# Patient Record
Sex: MALE | Race: WHITE | NOT HISPANIC OR LATINO | Employment: FULL TIME | URBAN - METROPOLITAN AREA
[De-identification: names, ages, dates, MRNs, and addresses within clinical notes are randomized per-mention and may not be internally consistent; named-entity substitution may affect disease eponyms.]

---

## 2017-01-24 ENCOUNTER — APPOINTMENT (OUTPATIENT)
Dept: CARDIOLOGY | Facility: CLINIC | Age: 57
DRG: 247 | End: 2017-01-24
Attending: EMERGENCY MEDICINE
Payer: COMMERCIAL

## 2017-01-24 ENCOUNTER — APPOINTMENT (OUTPATIENT)
Dept: GENERAL RADIOLOGY | Facility: CLINIC | Age: 57
DRG: 247 | End: 2017-01-24
Attending: EMERGENCY MEDICINE
Payer: COMMERCIAL

## 2017-01-24 ENCOUNTER — HOSPITAL ENCOUNTER (INPATIENT)
Facility: CLINIC | Age: 57
LOS: 3 days | Discharge: HOME | DRG: 247 | End: 2017-01-27
Attending: EMERGENCY MEDICINE | Admitting: INTERNAL MEDICINE
Payer: COMMERCIAL

## 2017-01-24 DIAGNOSIS — I10 BENIGN ESSENTIAL HTN: ICD-10-CM

## 2017-01-24 DIAGNOSIS — I25.5 ISCHEMIC CARDIOMYOPATHY: ICD-10-CM

## 2017-01-24 DIAGNOSIS — I21.3 ST ELEVATION MYOCARDIAL INFARCTION (STEMI), UNSPECIFIED ARTERY (H): ICD-10-CM

## 2017-01-24 DIAGNOSIS — E78.5 DYSLIPIDEMIA, GOAL LDL BELOW 70: Primary | ICD-10-CM

## 2017-01-24 PROBLEM — I21.02 ST ELEVATION (STEMI) MYOCARDIAL INFARCTION INVOLVING LEFT ANTERIOR DESCENDING CORONARY ARTERY (H): Status: ACTIVE | Noted: 2017-01-24

## 2017-01-24 LAB
ANION GAP SERPL CALCULATED.3IONS-SCNC: 8 MMOL/L (ref 3–14)
BASOPHILS # BLD AUTO: 0 10E9/L (ref 0–0.2)
BASOPHILS NFR BLD AUTO: 0.4 %
BUN SERPL-MCNC: 15 MG/DL (ref 7–30)
CALCIUM SERPL-MCNC: 8.3 MG/DL (ref 8.5–10.1)
CHLORIDE SERPL-SCNC: 107 MMOL/L (ref 94–109)
CO2 SERPL-SCNC: 24 MMOL/L (ref 20–32)
CREAT SERPL-MCNC: 1.12 MG/DL (ref 0.66–1.25)
DIFFERENTIAL METHOD BLD: NORMAL
EOSINOPHIL # BLD AUTO: 0.3 10E9/L (ref 0–0.7)
EOSINOPHIL NFR BLD AUTO: 2.6 %
ERYTHROCYTE [DISTWIDTH] IN BLOOD BY AUTOMATED COUNT: 12.8 % (ref 10–15)
GFR SERPL CREATININE-BSD FRML MDRD: 68 ML/MIN/1.7M2
GLUCOSE SERPL-MCNC: 134 MG/DL (ref 70–99)
HCT VFR BLD AUTO: 44.4 % (ref 40–53)
HGB BLD-MCNC: 15.6 G/DL (ref 13.3–17.7)
IMM GRANULOCYTES # BLD: 0 10E9/L (ref 0–0.4)
IMM GRANULOCYTES NFR BLD: 0.3 %
INR PPP: 1.02 (ref 0.86–1.14)
INTERPRETATION ECG - MUSE: NORMAL
LYMPHOCYTES # BLD AUTO: 3.3 10E9/L (ref 0.8–5.3)
LYMPHOCYTES NFR BLD AUTO: 30.4 %
MCH RBC QN AUTO: 31.1 PG (ref 26.5–33)
MCHC RBC AUTO-ENTMCNC: 35.1 G/DL (ref 31.5–36.5)
MCV RBC AUTO: 88 FL (ref 78–100)
MONOCYTES # BLD AUTO: 0.5 10E9/L (ref 0–1.3)
MONOCYTES NFR BLD AUTO: 4.7 %
NEUTROPHILS # BLD AUTO: 6.6 10E9/L (ref 1.6–8.3)
NEUTROPHILS NFR BLD AUTO: 61.6 %
NRBC # BLD AUTO: 0 10*3/UL
NRBC BLD AUTO-RTO: 0 /100
PLATELET # BLD AUTO: 210 10E9/L (ref 150–450)
POTASSIUM SERPL-SCNC: 5.1 MMOL/L (ref 3.4–5.3)
RBC # BLD AUTO: 5.02 10E12/L (ref 4.4–5.9)
SODIUM SERPL-SCNC: 139 MMOL/L (ref 133–144)
TROPONIN I BLD-MCNC: 0 UG/L (ref 0–0.1)
TROPONIN I SERPL-MCNC: NORMAL UG/L (ref 0–0.04)
WBC # BLD AUTO: 10.8 10E9/L (ref 4–11)

## 2017-01-24 PROCEDURE — 40000275 ZZH STATISTIC RCP TIME EA 10 MIN

## 2017-01-24 PROCEDURE — 84484 ASSAY OF TROPONIN QUANT: CPT

## 2017-01-24 PROCEDURE — 25000132 ZZH RX MED GY IP 250 OP 250 PS 637: Performed by: INTERNAL MEDICINE

## 2017-01-24 PROCEDURE — 25000128 H RX IP 250 OP 636: Performed by: INTERNAL MEDICINE

## 2017-01-24 PROCEDURE — C1887 CATHETER, GUIDING: HCPCS

## 2017-01-24 PROCEDURE — 93005 ELECTROCARDIOGRAM TRACING: CPT

## 2017-01-24 PROCEDURE — 92921 ZZHC PRQ TRLUML CORONARY ANGIOPLASTY ADDL BRANCH: CPT | Mod: LD | Performed by: INTERNAL MEDICINE

## 2017-01-24 PROCEDURE — 99153 MOD SED SAME PHYS/QHP EA: CPT

## 2017-01-24 PROCEDURE — C9606 PERC D-E COR REVASC W AMI S: HCPCS | Mod: LD

## 2017-01-24 PROCEDURE — 99152 MOD SED SAME PHYS/QHP 5/>YRS: CPT

## 2017-01-24 PROCEDURE — 92921 ZZHC PRQ TRLUML CORONARY ANGIOPLASTY ADDL BRANCH: CPT | Mod: LD

## 2017-01-24 PROCEDURE — 71010 XR CHEST PORT 1 VW: CPT

## 2017-01-24 PROCEDURE — C1769 GUIDE WIRE: HCPCS

## 2017-01-24 PROCEDURE — 27210759 ZZH DEVICE INFLATION CR6

## 2017-01-24 PROCEDURE — 027034Z DILATION OF CORONARY ARTERY, ONE ARTERY WITH DRUG-ELUTING INTRALUMINAL DEVICE, PERCUTANEOUS APPROACH: ICD-10-PCS | Performed by: INTERNAL MEDICINE

## 2017-01-24 PROCEDURE — 85025 COMPLETE CBC W/AUTO DIFF WBC: CPT | Performed by: EMERGENCY MEDICINE

## 2017-01-24 PROCEDURE — 93458 L HRT ARTERY/VENTRICLE ANGIO: CPT

## 2017-01-24 PROCEDURE — 85610 PROTHROMBIN TIME: CPT | Performed by: EMERGENCY MEDICINE

## 2017-01-24 PROCEDURE — 27210787 ZZH MANIFOLD CR2

## 2017-01-24 PROCEDURE — 80048 BASIC METABOLIC PNL TOTAL CA: CPT | Performed by: EMERGENCY MEDICINE

## 2017-01-24 PROCEDURE — 27210795 ZZH PAD DEFIB QUICK CR4

## 2017-01-24 PROCEDURE — 4A023N7 MEASUREMENT OF CARDIAC SAMPLING AND PRESSURE, LEFT HEART, PERCUTANEOUS APPROACH: ICD-10-PCS | Performed by: INTERNAL MEDICINE

## 2017-01-24 PROCEDURE — 02703ZZ DILATION OF CORONARY ARTERY, ONE ARTERY, PERCUTANEOUS APPROACH: ICD-10-PCS | Performed by: INTERNAL MEDICINE

## 2017-01-24 PROCEDURE — 27210845 ZZH DEVICE INFLATION CR5

## 2017-01-24 PROCEDURE — C1725 CATH, TRANSLUMIN NON-LASER: HCPCS

## 2017-01-24 PROCEDURE — 84484 ASSAY OF TROPONIN QUANT: CPT | Performed by: EMERGENCY MEDICINE

## 2017-01-24 PROCEDURE — 27210946 ZZH KIT HC TOTES DISP CR8

## 2017-01-24 PROCEDURE — B2111ZZ FLUOROSCOPY OF MULTIPLE CORONARY ARTERIES USING LOW OSMOLAR CONTRAST: ICD-10-PCS | Performed by: INTERNAL MEDICINE

## 2017-01-24 PROCEDURE — 27211089 ZZH KIT ACIST INJECTOR CR3

## 2017-01-24 PROCEDURE — 25000128 H RX IP 250 OP 636: Performed by: EMERGENCY MEDICINE

## 2017-01-24 PROCEDURE — C1874 STENT, COATED/COV W/DEL SYS: HCPCS

## 2017-01-24 PROCEDURE — 93010 ELECTROCARDIOGRAM REPORT: CPT | Performed by: INTERNAL MEDICINE

## 2017-01-24 PROCEDURE — 99285 EMERGENCY DEPT VISIT HI MDM: CPT

## 2017-01-24 PROCEDURE — 3E033PZ INTRODUCTION OF PLATELET INHIBITOR INTO PERIPHERAL VEIN, PERCUTANEOUS APPROACH: ICD-10-PCS | Performed by: INTERNAL MEDICINE

## 2017-01-24 PROCEDURE — 25000125 ZZHC RX 250: Performed by: INTERNAL MEDICINE

## 2017-01-24 PROCEDURE — 25000128 H RX IP 250 OP 636

## 2017-01-24 PROCEDURE — 25000132 ZZH RX MED GY IP 250 OP 250 PS 637: Performed by: EMERGENCY MEDICINE

## 2017-01-24 PROCEDURE — 99153 MOD SED SAME PHYS/QHP EA: CPT | Mod: GC | Performed by: INTERNAL MEDICINE

## 2017-01-24 PROCEDURE — 92941 PRQ TRLML REVSC TOT OCCL AMI: CPT | Mod: LD | Performed by: INTERNAL MEDICINE

## 2017-01-24 PROCEDURE — 20000003 ZZH R&B ICU

## 2017-01-24 PROCEDURE — 93458 L HRT ARTERY/VENTRICLE ANGIO: CPT | Mod: 26 | Performed by: INTERNAL MEDICINE

## 2017-01-24 PROCEDURE — 99152 MOD SED SAME PHYS/QHP 5/>YRS: CPT | Mod: GC | Performed by: INTERNAL MEDICINE

## 2017-01-24 RX ORDER — PRASUGREL 10 MG/1
10-60 TABLET, FILM COATED ORAL
Status: DISCONTINUED | OUTPATIENT
Start: 2017-01-24 | End: 2017-01-24 | Stop reason: HOSPADM

## 2017-01-24 RX ORDER — CLOPIDOGREL BISULFATE 75 MG/1
300-600 TABLET ORAL
Status: DISCONTINUED | OUTPATIENT
Start: 2017-01-24 | End: 2017-01-24 | Stop reason: HOSPADM

## 2017-01-24 RX ORDER — MORPHINE SULFATE 2 MG/ML
1-2 INJECTION, SOLUTION INTRAMUSCULAR; INTRAVENOUS EVERY 5 MIN PRN
Status: DISCONTINUED | OUTPATIENT
Start: 2017-01-24 | End: 2017-01-24 | Stop reason: HOSPADM

## 2017-01-24 RX ORDER — EPTIFIBATIDE 2 MG/ML
180 INJECTION, SOLUTION INTRAVENOUS EVERY 10 MIN PRN
Status: DISCONTINUED | OUTPATIENT
Start: 2017-01-24 | End: 2017-01-24 | Stop reason: HOSPADM

## 2017-01-24 RX ORDER — ACETAMINOPHEN 325 MG/1
325-650 TABLET ORAL EVERY 4 HOURS PRN
Status: DISCONTINUED | OUTPATIENT
Start: 2017-01-24 | End: 2017-01-27

## 2017-01-24 RX ORDER — ASPIRIN 81 MG/1
81-324 TABLET, CHEWABLE ORAL
Status: DISCONTINUED | OUTPATIENT
Start: 2017-01-24 | End: 2017-01-24 | Stop reason: HOSPADM

## 2017-01-24 RX ORDER — FENTANYL CITRATE 50 UG/ML
25-50 INJECTION, SOLUTION INTRAMUSCULAR; INTRAVENOUS
Status: DISPENSED | OUTPATIENT
Start: 2017-01-24 | End: 2017-01-25

## 2017-01-24 RX ORDER — LIDOCAINE HYDROCHLORIDE 10 MG/ML
1-10 INJECTION, SOLUTION EPIDURAL; INFILTRATION; INTRACAUDAL; PERINEURAL
Status: DISCONTINUED | OUTPATIENT
Start: 2017-01-24 | End: 2017-01-24 | Stop reason: HOSPADM

## 2017-01-24 RX ORDER — IOPAMIDOL 755 MG/ML
230 INJECTION, SOLUTION INTRAVASCULAR ONCE
Status: COMPLETED | OUTPATIENT
Start: 2017-01-24 | End: 2017-01-24

## 2017-01-24 RX ORDER — MORPHINE SULFATE 4 MG/ML
4 INJECTION, SOLUTION INTRAMUSCULAR; INTRAVENOUS ONCE
Status: COMPLETED | OUTPATIENT
Start: 2017-01-24 | End: 2017-01-24

## 2017-01-24 RX ORDER — SIMVASTATIN 20 MG
20 TABLET ORAL EVERY EVENING
Status: ON HOLD | COMMUNITY
Start: 2016-05-04 | End: 2017-01-27

## 2017-01-24 RX ORDER — FLUMAZENIL 0.1 MG/ML
0.2 INJECTION, SOLUTION INTRAVENOUS
Status: ACTIVE | OUTPATIENT
Start: 2017-01-24 | End: 2017-01-25

## 2017-01-24 RX ORDER — PHENYLEPHRINE HCL IN 0.9% NACL 1 MG/10 ML
20-100 SYRINGE (ML) INTRAVENOUS
Status: DISCONTINUED | OUTPATIENT
Start: 2017-01-24 | End: 2017-01-24 | Stop reason: HOSPADM

## 2017-01-24 RX ORDER — ADENOSINE 3 MG/ML
12-12000 INJECTION, SOLUTION INTRAVENOUS
Status: DISCONTINUED | OUTPATIENT
Start: 2017-01-24 | End: 2017-01-24 | Stop reason: HOSPADM

## 2017-01-24 RX ORDER — DOBUTAMINE HYDROCHLORIDE 200 MG/100ML
2-20 INJECTION INTRAVENOUS CONTINUOUS PRN
Status: DISCONTINUED | OUTPATIENT
Start: 2017-01-24 | End: 2017-01-24 | Stop reason: HOSPADM

## 2017-01-24 RX ORDER — MORPHINE SULFATE 4 MG/ML
INJECTION, SOLUTION INTRAMUSCULAR; INTRAVENOUS
Status: COMPLETED
Start: 2017-01-24 | End: 2017-01-24

## 2017-01-24 RX ORDER — VERAPAMIL HYDROCHLORIDE 2.5 MG/ML
1-2.5 INJECTION, SOLUTION INTRAVENOUS
Status: DISCONTINUED | OUTPATIENT
Start: 2017-01-24 | End: 2017-01-24 | Stop reason: HOSPADM

## 2017-01-24 RX ORDER — METHYLPREDNISOLONE SODIUM SUCCINATE 125 MG/2ML
125 INJECTION, POWDER, LYOPHILIZED, FOR SOLUTION INTRAMUSCULAR; INTRAVENOUS
Status: DISCONTINUED | OUTPATIENT
Start: 2017-01-24 | End: 2017-01-24 | Stop reason: HOSPADM

## 2017-01-24 RX ORDER — PROMETHAZINE HYDROCHLORIDE 25 MG/ML
6.25-25 INJECTION, SOLUTION INTRAMUSCULAR; INTRAVENOUS EVERY 4 HOURS PRN
Status: DISCONTINUED | OUTPATIENT
Start: 2017-01-24 | End: 2017-01-24 | Stop reason: HOSPADM

## 2017-01-24 RX ORDER — NALOXONE HYDROCHLORIDE 0.4 MG/ML
.1-.4 INJECTION, SOLUTION INTRAMUSCULAR; INTRAVENOUS; SUBCUTANEOUS
Status: DISCONTINUED | OUTPATIENT
Start: 2017-01-24 | End: 2017-01-27 | Stop reason: HOSPADM

## 2017-01-24 RX ORDER — NITROGLYCERIN 20 MG/100ML
.07-2 INJECTION INTRAVENOUS CONTINUOUS PRN
Status: DISCONTINUED | OUTPATIENT
Start: 2017-01-24 | End: 2017-01-24 | Stop reason: HOSPADM

## 2017-01-24 RX ORDER — DEXTROSE MONOHYDRATE 25 G/50ML
12.5-5 INJECTION, SOLUTION INTRAVENOUS EVERY 30 MIN PRN
Status: DISCONTINUED | OUTPATIENT
Start: 2017-01-24 | End: 2017-01-24 | Stop reason: HOSPADM

## 2017-01-24 RX ORDER — SODIUM NITROPRUSSIDE 25 MG/ML
100-200 INJECTION INTRAVENOUS
Status: DISCONTINUED | OUTPATIENT
Start: 2017-01-24 | End: 2017-01-24 | Stop reason: HOSPADM

## 2017-01-24 RX ORDER — DIPHENHYDRAMINE HYDROCHLORIDE 50 MG/ML
25-50 INJECTION INTRAMUSCULAR; INTRAVENOUS
Status: DISCONTINUED | OUTPATIENT
Start: 2017-01-24 | End: 2017-01-24 | Stop reason: HOSPADM

## 2017-01-24 RX ORDER — LIDOCAINE 40 MG/G
CREAM TOPICAL
Status: DISCONTINUED | OUTPATIENT
Start: 2017-01-24 | End: 2017-01-24

## 2017-01-24 RX ORDER — LIDOCAINE 40 MG/G
CREAM TOPICAL
Status: DISCONTINUED | OUTPATIENT
Start: 2017-01-24 | End: 2017-01-27

## 2017-01-24 RX ORDER — POTASSIUM CHLORIDE 29.8 MG/ML
20 INJECTION INTRAVENOUS
Status: DISCONTINUED | OUTPATIENT
Start: 2017-01-24 | End: 2017-01-24 | Stop reason: HOSPADM

## 2017-01-24 RX ORDER — LORAZEPAM 2 MG/ML
.5-2 INJECTION INTRAMUSCULAR EVERY 4 HOURS PRN
Status: DISCONTINUED | OUTPATIENT
Start: 2017-01-24 | End: 2017-01-24 | Stop reason: HOSPADM

## 2017-01-24 RX ORDER — NIFEDIPINE 10 MG/1
10 CAPSULE ORAL
Status: DISCONTINUED | OUTPATIENT
Start: 2017-01-24 | End: 2017-01-24 | Stop reason: HOSPADM

## 2017-01-24 RX ORDER — HYDROCODONE BITARTRATE AND ACETAMINOPHEN 5; 325 MG/1; MG/1
1-2 TABLET ORAL EVERY 4 HOURS PRN
Status: DISCONTINUED | OUTPATIENT
Start: 2017-01-24 | End: 2017-01-27 | Stop reason: HOSPADM

## 2017-01-24 RX ORDER — DOPAMINE HYDROCHLORIDE 160 MG/100ML
2-20 INJECTION, SOLUTION INTRAVENOUS CONTINUOUS PRN
Status: DISCONTINUED | OUTPATIENT
Start: 2017-01-24 | End: 2017-01-24 | Stop reason: HOSPADM

## 2017-01-24 RX ORDER — FUROSEMIDE 10 MG/ML
20-100 INJECTION INTRAMUSCULAR; INTRAVENOUS
Status: DISCONTINUED | OUTPATIENT
Start: 2017-01-24 | End: 2017-01-24 | Stop reason: HOSPADM

## 2017-01-24 RX ORDER — FENTANYL CITRATE 50 UG/ML
25-50 INJECTION, SOLUTION INTRAMUSCULAR; INTRAVENOUS
Status: DISCONTINUED | OUTPATIENT
Start: 2017-01-24 | End: 2017-01-24 | Stop reason: HOSPADM

## 2017-01-24 RX ORDER — HEPARIN SODIUM 1000 [USP'U]/ML
1000-10000 INJECTION, SOLUTION INTRAVENOUS; SUBCUTANEOUS EVERY 5 MIN PRN
Status: DISCONTINUED | OUTPATIENT
Start: 2017-01-24 | End: 2017-01-24 | Stop reason: HOSPADM

## 2017-01-24 RX ORDER — NALOXONE HYDROCHLORIDE 0.4 MG/ML
0.4 INJECTION, SOLUTION INTRAMUSCULAR; INTRAVENOUS; SUBCUTANEOUS EVERY 5 MIN PRN
Status: DISCONTINUED | OUTPATIENT
Start: 2017-01-24 | End: 2017-01-24 | Stop reason: HOSPADM

## 2017-01-24 RX ORDER — POTASSIUM CHLORIDE 7.45 MG/ML
10 INJECTION INTRAVENOUS
Status: DISCONTINUED | OUTPATIENT
Start: 2017-01-24 | End: 2017-01-24 | Stop reason: HOSPADM

## 2017-01-24 RX ORDER — ONDANSETRON 2 MG/ML
4 INJECTION INTRAMUSCULAR; INTRAVENOUS EVERY 4 HOURS PRN
Status: DISCONTINUED | OUTPATIENT
Start: 2017-01-24 | End: 2017-01-24 | Stop reason: HOSPADM

## 2017-01-24 RX ORDER — NICARDIPINE HYDROCHLORIDE 2.5 MG/ML
100 INJECTION INTRAVENOUS
Status: DISCONTINUED | OUTPATIENT
Start: 2017-01-24 | End: 2017-01-24 | Stop reason: HOSPADM

## 2017-01-24 RX ORDER — PROTAMINE SULFATE 10 MG/ML
1-5 INJECTION, SOLUTION INTRAVENOUS
Status: DISCONTINUED | OUTPATIENT
Start: 2017-01-24 | End: 2017-01-24 | Stop reason: HOSPADM

## 2017-01-24 RX ORDER — FLUMAZENIL 0.1 MG/ML
0.2 INJECTION, SOLUTION INTRAVENOUS
Status: DISCONTINUED | OUTPATIENT
Start: 2017-01-24 | End: 2017-01-24 | Stop reason: HOSPADM

## 2017-01-24 RX ORDER — LISINOPRIL 10 MG/1
10 TABLET ORAL DAILY
Status: DISCONTINUED | OUTPATIENT
Start: 2017-01-25 | End: 2017-01-25

## 2017-01-24 RX ORDER — METOPROLOL TARTRATE 25 MG/1
25 TABLET, FILM COATED ORAL 2 TIMES DAILY
Status: DISCONTINUED | OUTPATIENT
Start: 2017-01-24 | End: 2017-01-25

## 2017-01-24 RX ORDER — ATORVASTATIN CALCIUM 40 MG/1
80 TABLET, FILM COATED ORAL DAILY
Status: DISCONTINUED | OUTPATIENT
Start: 2017-01-25 | End: 2017-01-27 | Stop reason: HOSPADM

## 2017-01-24 RX ORDER — ENALAPRILAT 1.25 MG/ML
1.25-2.5 INJECTION INTRAVENOUS
Status: DISCONTINUED | OUTPATIENT
Start: 2017-01-24 | End: 2017-01-24 | Stop reason: HOSPADM

## 2017-01-24 RX ORDER — ASPIRIN 325 MG
325 TABLET ORAL
Status: DISCONTINUED | OUTPATIENT
Start: 2017-01-24 | End: 2017-01-24 | Stop reason: HOSPADM

## 2017-01-24 RX ORDER — NITROGLYCERIN 0.4 MG/1
0.4 TABLET SUBLINGUAL EVERY 5 MIN PRN
Status: DISCONTINUED | OUTPATIENT
Start: 2017-01-24 | End: 2017-01-27

## 2017-01-24 RX ORDER — NALOXONE HYDROCHLORIDE 0.4 MG/ML
.2-.4 INJECTION, SOLUTION INTRAMUSCULAR; INTRAVENOUS; SUBCUTANEOUS
Status: DISCONTINUED | OUTPATIENT
Start: 2017-01-24 | End: 2017-01-25

## 2017-01-24 RX ORDER — NITROGLYCERIN 5 MG/ML
100-500 VIAL (ML) INTRAVENOUS
Status: DISCONTINUED | OUTPATIENT
Start: 2017-01-24 | End: 2017-01-24 | Stop reason: HOSPADM

## 2017-01-24 RX ORDER — PROTAMINE SULFATE 10 MG/ML
25-100 INJECTION, SOLUTION INTRAVENOUS EVERY 5 MIN PRN
Status: DISCONTINUED | OUTPATIENT
Start: 2017-01-24 | End: 2017-01-24 | Stop reason: HOSPADM

## 2017-01-24 RX ORDER — BUPIVACAINE HYDROCHLORIDE 2.5 MG/ML
1-10 INJECTION, SOLUTION EPIDURAL; INFILTRATION; INTRACAUDAL
Status: DISCONTINUED | OUTPATIENT
Start: 2017-01-24 | End: 2017-01-24 | Stop reason: HOSPADM

## 2017-01-24 RX ORDER — ASPIRIN 81 MG/1
81 TABLET ORAL DAILY
Status: DISCONTINUED | OUTPATIENT
Start: 2017-01-25 | End: 2017-01-27

## 2017-01-24 RX ORDER — CLOPIDOGREL BISULFATE 75 MG/1
75 TABLET ORAL
Status: DISCONTINUED | OUTPATIENT
Start: 2017-01-24 | End: 2017-01-24 | Stop reason: HOSPADM

## 2017-01-24 RX ORDER — HYDRALAZINE HYDROCHLORIDE 20 MG/ML
10-20 INJECTION INTRAMUSCULAR; INTRAVENOUS
Status: DISCONTINUED | OUTPATIENT
Start: 2017-01-24 | End: 2017-01-24 | Stop reason: HOSPADM

## 2017-01-24 RX ORDER — NITROGLYCERIN 0.4 MG/1
0.4 TABLET SUBLINGUAL EVERY 5 MIN PRN
Status: DISCONTINUED | OUTPATIENT
Start: 2017-01-24 | End: 2017-01-24 | Stop reason: HOSPADM

## 2017-01-24 RX ORDER — NITROGLYCERIN 5 MG/ML
100-200 VIAL (ML) INTRAVENOUS
Status: DISCONTINUED | OUTPATIENT
Start: 2017-01-24 | End: 2017-01-24 | Stop reason: HOSPADM

## 2017-01-24 RX ORDER — SODIUM CHLORIDE 9 MG/ML
INJECTION, SOLUTION INTRAVENOUS CONTINUOUS
Status: ACTIVE | OUTPATIENT
Start: 2017-01-24 | End: 2017-01-25

## 2017-01-24 RX ORDER — EPTIFIBATIDE 2 MG/ML
2 INJECTION, SOLUTION INTRAVENOUS CONTINUOUS PRN
Status: DISCONTINUED | OUTPATIENT
Start: 2017-01-24 | End: 2017-01-24 | Stop reason: HOSPADM

## 2017-01-24 RX ORDER — LIDOCAINE HYDROCHLORIDE 10 MG/ML
30 INJECTION, SOLUTION EPIDURAL; INFILTRATION; INTRACAUDAL; PERINEURAL
Status: DISCONTINUED | OUTPATIENT
Start: 2017-01-24 | End: 2017-01-24 | Stop reason: HOSPADM

## 2017-01-24 RX ADMIN — MORPHINE SULFATE 4 MG: 4 INJECTION, SOLUTION INTRAMUSCULAR; INTRAVENOUS at 20:28

## 2017-01-24 RX ADMIN — MORPHINE SULFATE 4 MG: 4 INJECTION, SOLUTION INTRAMUSCULAR; INTRAVENOUS at 20:34

## 2017-01-24 RX ADMIN — TICAGRELOR 180 MG: 90 TABLET ORAL at 20:26

## 2017-01-24 RX ADMIN — NITROGLYCERIN 300 MCG: 5 INJECTION, SOLUTION INTRAVENOUS at 21:03

## 2017-01-24 RX ADMIN — MORPHINE SULFATE 2 MG: 2 INJECTION, SOLUTION INTRAMUSCULAR; INTRAVENOUS at 21:02

## 2017-01-24 RX ADMIN — HEPARIN SODIUM 6500 UNITS: 1000 INJECTION, SOLUTION INTRAVENOUS; SUBCUTANEOUS at 20:27

## 2017-01-24 RX ADMIN — NITROGLYCERIN 200 MCG: 5 INJECTION, SOLUTION INTRAVENOUS at 21:32

## 2017-01-24 RX ADMIN — IOPAMIDOL 230 ML: 755 INJECTION, SOLUTION INTRAVASCULAR at 21:45

## 2017-01-24 RX ADMIN — BIVALIRUDIN 1.75 MG/KG/HR: 250 INJECTION, POWDER, LYOPHILIZED, FOR SOLUTION INTRAVENOUS at 20:53

## 2017-01-24 RX ADMIN — METOPROLOL TARTRATE 25 MG: 25 TABLET, FILM COATED ORAL at 22:44

## 2017-01-24 RX ADMIN — Medication 69.5 MG: at 20:53

## 2017-01-24 RX ADMIN — NITROGLYCERIN 300 MCG: 5 INJECTION, SOLUTION INTRAVENOUS at 21:20

## 2017-01-24 RX ADMIN — SODIUM CHLORIDE, PRESERVATIVE FREE: 5 INJECTION INTRAVENOUS at 22:15

## 2017-01-24 ASSESSMENT — ENCOUNTER SYMPTOMS
SHORTNESS OF BREATH: 1
DIAPHORESIS: 1
NAUSEA: 1
VOMITING: 0

## 2017-01-24 ASSESSMENT — PAIN DESCRIPTION - DESCRIPTORS: DESCRIPTORS: ACHING

## 2017-01-24 NOTE — IP AVS SNAPSHOT
[facility]    [address] [place] [address]., [address]    [place] [address]    Phone:  [phone]                                       After Visit Summary   1/24/2017    Mark Butterfield    MRN: [id number]           After Visit Summary Signature Page     I have received my discharge instructions, and my questions have been answered. I have discussed any challenges I see with this plan with the nurse or doctor.    ..........................................................................................................................................  Patient/Patient Representative Signature      ..........................................................................................................................................  Patient Representative Print Name and Relationship to Patient    ..................................................               ................................................  Date                                            Time    ..........................................................................................................................................  Reviewed by Signature/Title    ...................................................              ..............................................  Date                                                            Time

## 2017-01-24 NOTE — IP AVS SNAPSHOT
MRN:[id number]                      After Visit Summary   1/24/2017    Mark Butterfield    MRN: [id number]           Thank you!     Thank you for choosing [facility] for your care. Our goal is always to provide you with excellent care. Hearing back from our patients is one way we can continue to improve our services. Please take a few minutes to complete the written survey that you may receive in the mail after you visit with us. Thank you!        Patient Information     Date Of Birth          1960        About your hospital stay     You were admitted on:  January 24, 2017 You last received care in the:  [facility]    You were discharged on:  January 27, 2017        Reason for your hospital stay       You had a heart attack and needed a stent placed in your left anterior descending artery and right posterior descending artery.                  Who to Call     For medical emergencies, please call 911.  For non-urgent questions about your medical care, please call your primary care provider or clinic, None          Attending Provider     Provider    Gopi Delgadillo MD Jama, Chace Pelletier MD       Primary Care Provider    None Specified       No primary provider on file.        After Care Instructions     Activity       Per nursing handout            Diet       Follow this diet upon discharge: cardiac diet                  Follow-up Appointments     Follow-up and recommended labs and tests        Follow up with primary care provider, No primary care provider on file., within 2 weeks.                  Your next 10 appointments already scheduled     Jan 31, 2017  8:00 AM   Evaluation 105 with  Cardiac Rehab 1   [facility] ([facility])    [address], [address]  [facility] [address]   [phone]            Feb 03, 2017  2:10 PM   LAB with [facility]   [facility] [place] ([facility]  [facility])    [address] [place] [address]   [phone]           Patient must bring picture ID.  Patient should be prepared to give a urine specimen  Please do not eat 10-12 hours before your appointment if you are coming in fasting for labs on lipids, cholesterol, or glucose (sugar).  Pregnant women should follow their Care Team instructions. Water with medications is okay. Do not drink coffee or other fluids.   If you have concerns about taking  your medications, please ask at office or if scheduling via [employer], send a message by clicking on Secure Messaging, Message Your Care Team.            Feb 03, 2017  3:10 PM   Return Visit with Maria Victoria Shields PA-C   [facility] ([facility])    [address] [place] [address]   [phone]            Apr 12, 2017 12:40 PM   LAB with [facility]   [facility] ([facility])    [address] [place] [id number] [phone]           Patient must bring picture ID.  Patient should be prepared to give a urine specimen  Please do not eat 10-12 hours before your appointment if you are coming in fasting for labs on lipids, cholesterol, or glucose (sugar).  Pregnant women should follow their Care Team instructions. Water with medications is okay. Do not drink coffee or other fluids.   If you have concerns about taking  your medications, please ask at office or if scheduling via [employer], send a message by clicking on Secure Messaging, Message Your Care Team.            Apr 12, 2017  1:45 PM   Return Visit with Tomas Joe MD   [facility] ([facility])    [address] [place] [address]   [phone]              Additional Services     Cardiac Rehab Referral       Your provider has referred you to: [facility]: [facility]  "[facility] - [place] [phone]   [email or web address]            Follow-Up with Cardiac Advanced Practice Provider           Follow-Up with Cardiologist                 Future tests that were ordered for you     Basic metabolic panel           ALT       Last Lab Result: No results found for: ALT            Lipid Profile                 Pending Results     Date and Time Order Name Status Description    1/27/2017 0936 EKG 12-lead, tracing only  Preliminary     1/25/2017 1229 EKG 12-lead, tracing only Preliminary     1/24/2017 2300 EKG 12-lead, tracing only  Preliminary     1/24/2017 2217 EKG 12-lead, tracing only  Preliminary             Statement of Approval     Ordered          01/27/17 1134  I have reviewed and agree with all the recommendations and orders detailed in this document.   EFFECTIVE NOW     Approved and electronically signed by:  Maria Victoria Shields PA-C             Admission Information        Provider Department Dept Phone    1/24/2017 Chace Roche MD, MD  Cardiac Spec Care [phone]      Your Vitals Were     Blood Pressure Pulse Temperature    112/73 mmHg 110 98.3  F (36.8  C) (Oral)    Respirations Height Weight    13 1.88 m (6' 2\") 116.7 kg (257 lb 4.4 oz)    BMI (Body Mass Index) Pulse Oximetry       33.02 kg/m2 94%       MyChart Information     UpOutt lets you send messages to your doctor, view your test results, renew your prescriptions, schedule appointments and more. To sign up, go to [email or web address] . Click on \"Log in\" on the left side of the screen, which will take you to the Welcome page. Then click on \"Sign up Now\" on the right side of the page.     You will be asked to enter the access code listed below, as well as some personal information. Please follow the directions to create your username and password.     Your access code is: [email or web address]  Expires: 4/27/2017  1:23 PM     Your access code will "  in 90 days. If you need help or a new code, please call your [facility] clinic or [phone].        Care EveryWhere ID     This is your Care EveryWhere ID. This could be used by other organizations to access your [facility] medical records  [id number]           Review of your medicines      START taking        Dose / Directions    aspirin 81 MG EC tablet   Used for:  ST elevation myocardial infarction (STEMI), unspecified artery (H)        Dose:  81 mg   Start taking on:  2017   Take 1 tablet (81 mg) by mouth daily   Refills:  0       atorvastatin 80 MG tablet   Commonly known as:  LIPITOR   Used for:  Dyslipidemia, goal LDL below 70        Dose:  80 mg   Take 1 tablet (80 mg) by mouth At Bedtime   Quantity:  90 tablet   Refills:  3       carvedilol 12.5 MG tablet   Commonly known as:  COREG   Used for:  Ischemic cardiomyopathy, Benign essential HTN        Dose:  12.5 mg   Take 1 tablet (12.5 mg) by mouth 2 times daily (with meals)   Quantity:  180 tablet   Refills:  3       lisinopril 20 MG tablet   Commonly known as:  PRINIVIL/ZESTRIL   Used for:  Ischemic cardiomyopathy, ST elevation myocardial infarction (STEMI), unspecified artery (H), Benign essential HTN        Dose:  20 mg   Take 1 tablet (20 mg) by mouth daily   Quantity:  90 tablet   Refills:  3       nitroglycerin 0.4 MG sublingual tablet   Commonly known as:  NITROSTAT   Used for:  ST elevation myocardial infarction (STEMI), unspecified artery (H)        Dose:  0.4 mg   Place 1 tablet (0.4 mg) under the tongue every 5 minutes as needed for chest pain (may repeat x 2)   Quantity:  25 tablet   Refills:  3       ticagrelor 90 MG tablet   Commonly known as:  BRILINTA   Used for:  ST elevation myocardial infarction (STEMI), unspecified artery (H)        Dose:  90 mg   Take 1 tablet (90 mg) by mouth every 12 hours To protect your stent(s).  Do not stop taking unless directed by cardiology.   Quantity:  180 tablet   Refills:  3         CONTINUE  these medicines which have NOT CHANGED        Dose / Directions    hydrocortisone 2.5 % cream   Commonly known as:  ANUSOL-HC        Place rectally 3 times daily as needed   Refills:  0         STOP taking     simvastatin 20 MG tablet   Commonly known as:  ZOCOR                Where to get your medicines      These medications were sent to [facility] [place] - [place] - [address]  [address], [place] [address]     Phone:  [phone]    - atorvastatin 80 MG tablet  - carvedilol 12.5 MG tablet  - lisinopril 20 MG tablet  - nitroglycerin 0.4 MG sublingual tablet  - ticagrelor 90 MG tablet      Some of these will need a paper prescription and others can be bought over the counter. Ask your nurse if you have questions.     You don't need a prescription for these medications    - aspirin 81 MG EC tablet             Protect others around you: Learn how to safely use, store and throw away your medicines at www.disposemymeds.org.             Medication List: This is a list of all your medications and when to take them. Check marks below indicate your daily home schedule. Keep this list as a reference.      Medications           Morning Afternoon Evening Bedtime As Needed    aspirin 81 MG EC tablet   Take 1 tablet (81 mg) by mouth daily   Start taking on:  1/28/2017   Last time this was given:  81 mg on 1/27/2017  8:05 AM                                   atorvastatin 80 MG tablet   Commonly known as:  LIPITOR   Take 1 tablet (80 mg) by mouth At Bedtime   Last time this was given:  80 mg on 1/27/2017  8:05 AM                                   carvedilol 12.5 MG tablet   Commonly known as:  COREG   Take 1 tablet (12.5 mg) by mouth 2 times daily (with meals)   Last time this was given:  12.5 mg on 1/27/2017  8:05 AM                                      hydrocortisone 2.5 % cream   Commonly known as:  ANUSOL-HC   Place rectally 3 times daily as needed                                   lisinopril  20 MG tablet   Commonly known as:  PRINIVIL/ZESTRIL   Take 1 tablet (20 mg) by mouth daily   Last time this was given:  20 mg on 1/27/2017  8:05 AM                                   nitroglycerin 0.4 MG sublingual tablet   Commonly known as:  NITROSTAT   Place 1 tablet (0.4 mg) under the tongue every 5 minutes as needed for chest pain (may repeat x 2)                                   ticagrelor 90 MG tablet   Commonly known as:  BRILINTA   Take 1 tablet (90 mg) by mouth every 12 hours To protect your stent(s).  Do not stop taking unless directed by cardiology.   Last time this was given:  90 mg on 1/27/2017  8:05 AM                                                More Information        Ticagrelor Oral tablet  What is this medicine?  TICAGRELOR (MICHAEL ka GREL or) helps to prevent blood clots. This medicine is used to prevent heart attack, stroke, or other vascular events in people who have had a recent heart attack or who have severe chest pain.  This medicine may be used for other purposes; ask your health care provider or pharmacist if you have questions.  What should I tell my health care provider before I take this medicine?  They need to know if you have any of these conditions:    bleeding disorder    bleeding in the brain    liver disease    planned surgery    stomach or intestinal ulcers    stroke or transient ischemic attack    an unusual or allergic reaction to ticagrelor, other medicines, foods, dyes, or preservatives    pregnant or trying to get pregnant    breast-feeding  How should I use this medicine?  Take this medicine by mouth with a glass of water. Follow the directions on the prescription label. You can take it with or without food. If it upsets your stomach, take it with food. Take your medicine at regular intervals. Do not take it more often than directed. Do not stop taking except on your doctor's advice.  Talk to you pediatrician regarding the use of this medicine in children. Special care may  be needed.  Overdosage: If you think you've taken too much of this medicine contact a poison control center or emergency room at once.  NOTE: This medicine is only for you. Do not share this medicine with others.  What if I miss a dose?  If you miss a dose, take it as soon as you can. If it is almost time for your next dose, take only that dose. Do not take double or extra doses.  What may interact with this medicine?    certain antibiotics like clarithromycin and telithromycin    certain medicines for fungal infections like itraconazole, ketoconazole, and voriconazole    certain medicines for HIV infection like atazanavir, indinavir, nelfinavir, ritonavir, and saquinavir    certain medicines for seizures like carbamazepine, phenobarbital, and phenytoin    certain medicines that treat or prevent blood clots like warfarin    dexamethasone    digoxin    lovastatin    nefazodone    rifampin    simvastatin  This list may not describe all possible interactions. Give your health care provider a list of all the medicines, herbs, non-prescription drugs, or dietary supplements you use. Also tell them if you smoke, drink alcohol, or use illegal drugs. Some items may interact with your medicine.  What should I watch for while using this medicine?  Visit your doctor or health care professional for regular check ups. Do not stop taking you medicine unless your doctor tells you to.  Notify your doctor or health care professional and seek emergency treatment if you develop breathing problems; changes in vision; chest pain; severe, sudden headache; pain, swelling, warmth in the leg; trouble speaking; sudden numbness or weakness of the face, arm, or leg. These can be signs that your condition has gotten worse.  If you are going to have surgery or dental work, tell your doctor or health care professional that you are taking this medicine.  You should take aspirin every day with this medicine. Do not take more than 100 mg each day.  Talk to your doctor if you have questions.  What side effects may I notice from receiving this medicine?  Side effects that you should report to your doctor or health care professional as soon as possible:    allergic reactions like skin rash, itching or hives, swelling of the face, lips, or tongue    breathing problems    fast or irregular heartbeat    feeling faint or light-headed, falls    signs and symptoms of bleeding such as bloody or black, tarry stools; red or dark-brown urine; spitting up blood or brown material that looks like coffee grounds; red spots on the skin; unusual bruising or bleeding from the eye, gums, or nose  Side effects that usually do not require medical attention (Report these to your doctor or health care professional if they continue or are bothersome.):    breast enlargement in both males and females    diarrhea    dizziness    headache    tiredness    upset stomach  This list may not describe all possible side effects. Call your doctor for medical advice about side effects. You may report side effects to FDA at 8-498-FDA-6331.  Where should I keep my medicine?  Keep out of the reach of children.  Store at room temperature of 59 to 86 degrees F (15 to 30 degrees C). Throw away any unused medicine after the expiration date.  NOTE: This sheet is a summary. It may not cover all possible information. If you have questions about this medicine, talk to your doctor, pharmacist, or health care provider.  NOTE:This sheet is a summary. It may not cover all possible information. If you have questions about this medicine, talk to your doctor, pharmacist, or health care provider. Copyright  2016 Gold Standard                Atorvastatin Calcium Oral tablet  What is this medicine?  ATORVASTATIN (a TORE va sta tin) is known as a HMG-CoA reductase inhibitor or 'statin'. It lowers the level of cholesterol and triglycerides in the blood. This drug may also reduce the risk of heart attack, stroke, or  other health problems in patients with risk factors for heart disease. Diet and lifestyle changes are often used with this drug.  This medicine may be used for other purposes; ask your health care provider or pharmacist if you have questions.  What should I tell my health care provider before I take this medicine?  They need to know if you have any of these conditions:    frequently drink alcoholic beverages    history of stroke, TIA    kidney disease    liver disease    muscle aches or weakness    other medical condition    an unusual or allergic reaction to atorvastatin, other medicines, foods, dyes, or preservatives    pregnant or trying to get pregnant    breast-feeding  How should I use this medicine?  Take this medicine by mouth with a glass of water. Follow the directions on the prescription label. You can take this medicine with or without food. Take your doses at regular intervals. Do not take your medicine more often than directed.  Talk to your pediatrician regarding the use of this medicine in children. While this drug may be prescribed for children as young as 10 years old for selected conditions, precautions do apply.  Overdosage: If you think you have taken too much of this medicine contact a poison control center or emergency room at once.  NOTE: This medicine is only for you. Do not share this medicine with others.  What if I miss a dose?  If you miss a dose, take it as soon as you can. If it is almost time for your next dose, take only that dose. Do not take double or extra doses.  What may interact with this medicine?  Do not take this medicine with any of the following medications:    red yeast rice    telaprevir    telithromycin    voriconazole  This medicine may also interact with the following medications:    alcohol    antiviral medicines for HIV or AIDS    boceprevir    certain antibiotics like clarithromycin, erythromycin, troleandomycin    certain medicines for cholesterol like fenofibrate  or gemfibrozil    cimetidine    clarithromycin    colchicine    cyclosporine    digoxin    female hormones, like estrogens or progestins and birth control pills    grapefruit juice    medicines for fungal infections like fluconazole, itraconazole, ketoconazole    niacin    rifampin    spironolactone  This list may not describe all possible interactions. Give your health care provider a list of all the medicines, herbs, non-prescription drugs, or dietary supplements you use. Also tell them if you smoke, drink alcohol, or use illegal drugs. Some items may interact with your medicine.  What should I watch for while using this medicine?  Visit your doctor or health care professional for regular check-ups. You may need regular tests to make sure your liver is working properly.  Tell your doctor or health care professional right away if you get any unexplained muscle pain, tenderness, or weakness, especially if you also have a fever and tiredness. Your doctor or health care professional may tell you to stop taking this medicine if you develop muscle problems. If your muscle problems do not go away after stopping this medicine, contact your health care professional.  This drug is only part of a total heart-health program. Your doctor or a dietician can suggest a low-cholesterol and low-fat diet to help. Avoid alcohol and smoking, and keep a proper exercise schedule.  Do not use this drug if you are pregnant or breast-feeding. Serious side effects to an unborn child or to an infant are possible. Talk to your doctor or pharmacist for more information.  This medicine may affect blood sugar levels. If you have diabetes, check with your doctor or health care professional before you change your diet or the dose of your diabetic medicine.  If you are going to have surgery tell your health care professional that you are taking this drug.  What side effects may I notice from receiving this medicine?  Side effects that you should  report to your doctor or health care professional as soon as possible:    allergic reactions like skin rash, itching or hives, swelling of the face, lips, or tongue    dark urine    fever    joint pain    muscle cramps, pain    redness, blistering, peeling or loosening of the skin, including inside the mouth    trouble passing urine or change in the amount of urine    unusually weak or tired    yellowing of eyes or skin  Side effects that usually do not require medical attention (report to your doctor or health care professional if they continue or are bothersome):    constipation    heartburn    stomach gas, pain, upset  This list may not describe all possible side effects. Call your doctor for medical advice about side effects. You may report side effects to FDA at 0-399-MMZ-4027.  Where should I keep my medicine?  Keep out of the reach of children.  Store at room temperature between 20 to 25 degrees C (68 to 77 degrees F). Throw away any unused medicine after the expiration date.  NOTE:This sheet is a summary. It may not cover all possible information. If you have questions about this medicine, talk to your doctor, pharmacist, or health care provider. Copyright  2016 Gold Standard                Patient Education    Carvedilol Oral capsule, extended-release    Carvedilol Oral tablet  Carvedilol Oral tablet  What is this medicine?  CARVEDILOL (ISELA ve dil ol) is a beta-blocker. Beta-blockers reduce the workload on the heart and help it to beat more regularly. This medicine is used to treat high blood pressure and heart failure.  This medicine may be used for other purposes; ask your health care provider or pharmacist if you have questions.  What should I tell my health care provider before I take this medicine?  They need to know if you have any of these conditions:    circulation problems    diabetes    history of heart attack or heart disease    liver disease    lung or breathing disease, like asthma or  emphysema    pheochromocytoma    slow or irregular heartbeat    thyroid disease    an unusual or allergic reaction to carvedilol, other beta-blockers, medicines, foods, dyes, or preservatives    pregnant or trying to get pregnant    breast-feeding  How should I use this medicine?  Take this medicine by mouth with a glass of water. Follow the directions on the prescription label. It is best to take the tablets with food. Take your doses at regular intervals. Do not take your medicine more often than directed. Do not stop taking except on the advice of your doctor or health care professional.  Talk to your pediatrician regarding the use of this medicine in children. Special care may be needed.  Overdosage: If you think you have taken too much of this medicine contact a poison control center or emergency room at once.  NOTE: This medicine is only for you. Do not share this medicine with others.  What if I miss a dose?  If you miss a dose, take it as soon as you can. If it is almost time for your next dose, take only that dose. Do not take double or extra doses.  What may interact with this medicine?  This medicine may interact with the following medications:    certain medicines for blood pressure, heart disease, irregular heart beat    certain medicines for depression, like fluoxetine or paroxetine    certain medicines for diabetes, like glipizide or glyburide    cimetidine    clonidine    cyclosporine    digoxin    MAOIs like Carbex, Eldepryl, Marplan, Nardil, and Parnate    reserpine    rifampin  This list may not describe all possible interactions. Give your health care provider a list of all the medicines, herbs, non-prescription drugs, or dietary supplements you use. Also tell them if you smoke, drink alcohol, or use illegal drugs. Some items may interact with your medicine.  What should I watch for while using this medicine?  Check your heart rate and blood pressure regularly while you are taking this medicine.  Ask your doctor or health care professional what your heart rate and blood pressure should be, and when you should contact him or her. Do not stop taking this medicine suddenly. This could lead to serious heart-related effects.  Contact your doctor or health care professional if you have difficulty breathing while taking this drug.  Check your weight daily. Ask your doctor or health care professional when you should notify him/her of any weight gain.  You may get drowsy or dizzy. Do not drive, use machinery, or do anything that requires mental alertness until you know how this medicine affects you. To reduce the risk of dizzy or fainting spells, do not sit or stand up quickly. Alcohol can make you more drowsy, and increase flushing and rapid heartbeats. Avoid alcoholic drinks.  If you have diabetes, check your blood sugar as directed. Tell your doctor if you have changes in your blood sugar while you are taking this medicine.  If you are going to have surgery, tell your doctor or health care professional that you are taking this medicine.  What side effects may I notice from receiving this medicine?  Side effects that you should report to your doctor or health care professional as soon as possible:    allergic reactions like skin rash, itching or hives, swelling of the face, lips, or tongue    breathing problems    dark urine    irregular heartbeat    swollen legs or ankles    vomiting    yellowing of the eyes or skin  Side effects that usually do not require medical attention (report to your doctor or health care professional if they continue or are bothersome):    change in sex drive or performance    diarrhea    dry eyes (especially if wearing contact lenses)    dry, itching skin    headache    nausea    unusually tired  This list may not describe all possible side effects. Call your doctor for medical advice about side effects. You may report side effects to FDA at 9-940-FDA-0072.  Where should I keep my  medicine?  Keep out of the reach of children.  Store at room temperature below 30 degrees C (86 degrees F). Protect from moisture. Keep container tightly closed. Throw away any unused medicine after the expiration date.  NOTE:This sheet is a summary. It may not cover all possible information. If you have questions about this medicine, talk to your doctor, pharmacist, or health care provider. Copyright  2016 Gold Standard                Lisinopril Oral tablet  What is this medicine?  LISINOPRIL (lyse IN oh pril) is an ACE inhibitor. This medicine is used to treat high blood pressure and heart failure. It is also used to protect the heart immediately after a heart attack.  This medicine may be used for other purposes; ask your health care provider or pharmacist if you have questions.  What should I tell my health care provider before I take this medicine?  They need to know if you have any of these conditions:    diabetes    heart or blood vessel disease    immune system disease like lupus or scleroderma    kidney disease    low blood pressure    previous swelling of the tongue, face, or lips with difficulty breathing, difficulty swallowing, hoarseness, or tightening of the throat    an unusual or allergic reaction to lisinopril, other ACE inhibitors, insect venom, foods, dyes, or preservatives    pregnant or trying to get pregnant    breast-feeding  How should I use this medicine?  Take this medicine by mouth with a glass of water. Follow the directions on your prescription label. You may take this medicine with or without food. Take your medicine at regular intervals. Do not stop taking this medicine except on the advice of your doctor or health care professional.  Talk to your pediatrician regarding the use of this medicine in children. Special care may be needed. While this drug may be prescribed for children as young as 6 years of age for selected conditions, precautions do apply.  Overdosage: If you think you  have taken too much of this medicine contact a poison control center or emergency room at once.  NOTE: This medicine is only for you. Do not share this medicine with others.  What if I miss a dose?  If you miss a dose, take it as soon as you can. If it is almost time for your next dose, take only that dose. Do not take double or extra doses.  What may interact with this medicine?    diuretics    lithium    NSAIDs, medicines for pain and inflammation, like ibuprofen or naproxen    over-the-counter herbal supplements like hawthorn    potassium salts or potassium supplements    salt substitutes  This list may not describe all possible interactions. Give your health care provider a list of all the medicines, herbs, non-prescription drugs, or dietary supplements you use. Also tell them if you smoke, drink alcohol, or use illegal drugs. Some items may interact with your medicine.  What should I watch for while using this medicine?  Visit your doctor or health care professional for regular check ups. Check your blood pressure as directed. Ask your doctor what your blood pressure should be, and when you should contact him or her. Call your doctor or health care professional if you notice an irregular or fast heart beat.  Women should inform their doctor if they wish to become pregnant or think they might be pregnant. There is a potential for serious side effects to an unborn child. Talk to your health care professional or pharmacist for more information.  Check with your doctor or health care professional if you get an attack of severe diarrhea, nausea and vomiting, or if you sweat a lot. The loss of too much body fluid can make it dangerous for you to take this medicine.  You may get drowsy or dizzy. Do not drive, use machinery, or do anything that needs mental alertness until you know how this drug affects you. Do not stand or sit up quickly, especially if you are an older patient. This reduces the risk of dizzy or  fainting spells. Alcohol can make you more drowsy and dizzy. Avoid alcoholic drinks.  Avoid salt substitutes unless you are told otherwise by your doctor or health care professional.  Do not treat yourself for coughs, colds, or pain while you are taking this medicine without asking your doctor or health care professional for advice. Some ingredients may increase your blood pressure.  What side effects may I notice from receiving this medicine?  Side effects that you should report to your doctor or health care professional as soon as possible:    abdominal pain with or without nausea or vomiting    allergic reactions like skin rash or hives, swelling of the hands, feet, face, lips, throat, or tongue    dark urine    difficulty breathing    dizzy, lightheaded or fainting spell    fever or sore throat    irregular heart beat, chest pain    pain or difficulty passing urine    redness, blistering, peeling or loosening of the skin, including inside the mouth    unusually weak    yellowing of the eyes or skin  Side effects that usually do not require medical attention (report to your doctor or health care professional if they continue or are bothersome):    change in taste    cough    decreased sexual function or desire    headache    sun sensitivity    tiredness  This list may not describe all possible side effects. Call your doctor for medical advice about side effects. You may report side effects to FDA at 3-191-FDA-5729.  Where should I keep my medicine?  Keep out of the reach of children.  Store at room temperature between 15 and 30 degrees C (59 and 86 degrees F). Protect from moisture. Keep container tightly closed. Throw away any unused medicine after the expiration date.  NOTE:This sheet is a summary. It may not cover all possible information. If you have questions about this medicine, talk to your doctor, pharmacist, or health care provider. Copyright  2016 Gold Standard                Patient  Education    Aspirin Chewable tablet    Aspirin Chewing-gum, medicated    Aspirin Gastro-resistant tablet    Aspirin Oral tablet    Aspirin Oral tablet, extended-release    Aspirin Rectal suppository  Aspirin Gastro-resistant tablet  What is this medicine?  ASPIRIN (AS pir in) is a pain reliever. It is used to treat mild pain and fever. This medicine is also used as directed by a doctor to prevent and to treat heart attacks, to prevent strokes, and to treat arthritis or inflammation.  This medicine may be used for other purposes; ask your health care provider or pharmacist if you have questions.  What should I tell my health care provider before I take this medicine?  They need to know if you have any of these conditions:    anemia    asthma    bleeding problems    child with chickenpox, the flu, or other viral infection    diabetes    gout    if you frequently drink alcohol containing drinks    kidney disease    liver disease    low level of vitamin K    lupus    smoke tobacco    stomach ulcers or other problems    an unusual or allergic reaction to aspirin, tartrazine dye, other medicines, dyes, or preservatives    pregnant or trying to get pregnant    breast-feeding  How should I use this medicine?  Take this medicine by mouth with a glass of water. Follow the directions on the package or prescription label. Do not chew, crush, or cut this medicine. You can take this medicine with or without food. If it upsets your stomach, take it with food. Do not take your medicine more often than directed.  Talk to your pediatrician regarding the use of this medicine in children. While this drug may be prescribed for children as young as 12 years of age for selected conditions, precautions do apply. Children and teenagers should not use this medicine to treat chicken pox or flu symptoms unless directed by a doctor.  Patients over 65 years old may have a stronger reaction and need a smaller dose.  Overdosage: If you think you  have taken too much of this medicine contact a poison control center or emergency room at once.  NOTE: This medicine is only for you. Do not share this medicine with others.  What if I miss a dose?  If you are taking this medicine on a regular schedule and miss a dose, take it as soon as you can. If it is almost time for your next dose, take only that dose. Do not take double or extra doses.  What may interact with this medicine?  Do not take this medicine with any of the following medications:    cidofovir    ketorolac    probenecid  This medicine may also interact with the following medications:    alcohol    alendronate    bismuth subsalicylate    flavocoxid    herbal supplements like feverfew, garlic, susan, ginkgo biloba, horse chestnut    medicines for diabetes or glaucoma like acetazolamide, methazolamide    medicines for gout    medicines that treat or prevent blood clots like enoxaparin, heparin, ticlopidine, warfarin    other aspirin and aspirin-like medicines    NSAIDs, medicines for pain and inflammation, like ibuprofen or naproxen    pemetrexed    sulfinpyrazone    varicella live vaccine  This list may not describe all possible interactions. Give your health care provider a list of all the medicines, herbs, non-prescription drugs, or dietary supplements you use. Also tell them if you smoke, drink alcohol, or use illegal drugs. Some items may interact with your medicine.  What should I watch for while using this medicine?  If you are treating yourself for pain, tell your doctor or health care professional if the pain lasts more than 10 days, if it gets worse, or if there is a new or different kind of pain. Tell your doctor if you see redness or swelling. Also, check with your doctor if you have a fever that lasts for more than 3 days. Only take this medicine to prevent heart attacks or blood clotting if prescribed by your doctor or health care professional.  Do not take aspirin or aspirin-like medicines  with this medicine. Too much aspirin can be dangerous. Always read the labels carefully.  This medicine can irritate your stomach or cause bleeding problems. Do not smoke cigarettes or drink alcohol while taking this medicine. Do not lie down for 30 minutes after taking this medicine to prevent irritation to your throat.  If you are scheduled for any medical or dental procedure, tell your healthcare provider that you are taking this medicine. You may need to stop taking this medicine before the procedure.  This medicine may be used to treat migraines. If you take migraine medicines for 10 or more days a month, your migraines may get worse. Keep a diary of headache days and medicine use. Contact your healthcare professional if your migraine attacks occur more frequently.  What side effects may I notice from receiving this medicine?  Side effects that you should report to your doctor or health care professional as soon as possible:    allergic reactions like skin rash, itching or hives, swelling of the face, lips, or tongue    breathing problems    changes in hearing, ringing in the ears    confusion    general ill feeling or flu-like symptoms    pain on swallowing    redness, blistering, peeling or loosening of the skin, including inside the mouth or nose    signs and symptoms of bleeding such as bloody or black, tarry stools; red or dark-brown urine; spitting up blood or brown material that looks like coffee grounds; red spots on the skin; unusual bruising or bleeding from the eye, gums, or nose    trouble passing urine or change in the amount of urine    unusually weak or tired    yellowing of the eyes or skin  Side effects that usually do not require medical attention (report to your doctor or health care professional if they continue or are bothersome):    diarrhea or constipation    headache    nausea, vomiting    stomach gas, heartburn  This list may not describe all possible side effects. Call your doctor for  medical advice about side effects. You may report side effects to FDA at 3-846-KLY-9825.  Where should I keep my medicine?  Keep out of the reach of children.  Store at room temperature between 15 and 30 degrees C (59 and 86 degrees F). Protect from heat and moisture. Do not use this medicine if it has a strong vinegar smell. Throw away any unused medicine after the expiration date.  NOTE:This sheet is a summary. It may not cover all possible information. If you have questions about this medicine, talk to your doctor, pharmacist, or health care provider. Copyright  2016 Gold Standard                Discharge Instructions for Peripheral Angioplasty  You had a procedure known as peripheral angioplasty. Peripheral arteries deliver blood to your legs and feet. Over time, your artery walls may thicken and build up with plaque (a fatlike substance). As plaque builds up in an artery, blood flow can be reduced or even blocked, causing peripheral artery disease and problems in your legs and feet. Peripheral angioplasty is a procedure that helps open blockages in peripheral arteries.  Home Care    Don t drive for 2 to 3 days after the procedure.    Rest for 2 to 3 days after the procedure. Most patients are able to resume normal activity within a few days.    Don t lift anything heavier than 10 pounds for 5 to 7 days.    Take your temperature and check your incision site for signs of infection (redness, swelling, or warmth) every day for a week.    Keep a dressing on the incision site for at least 24 hours, or as instructed by your doctor.    Take your medications exactly as directed. Don t skip doses.    You can shower the day after the procedure.    If you have sutures, avoid swimming or taking a bath for 7 days after the procedure or until the sutures are removed.    Unless directed otherwise, drink 6 to 8 glasses of water a day to prevent dehydration and to help flush your body of the dye that was used during your  procedure. (Talk to your doctor first if you are on dialysis or have heart failure.)    Eat a healthy diet that is low in fat, salt, and cholesterol. Ask your doctor for menus and other diet information.    Begin an exercise program. Ask your doctor how to get started. You can benefit from simple activities such as walking or gardening.    If you are a smoker, make an effort to break the smoking habit. Enroll in a stop-smoking program to increase your chances of success.  Follow-Up    If you have stitches or staples, see your doctor to have them removed 7 to 10 days after your procedure.     When to Call Your Doctor  Call your doctor right away if you have any of the following:    Fever above 100 F (37.8 C)    Signs of infection at the incision site (redness, swelling, or warmth)    Drainage from your incision    Changes in color, temperature, feeling, or movement in either foot or leg    Constant or increasing pain or numbness in your leg    Leg swelling that does not improve overnight    Bleeding, bruising, or a large swelling where the catheter was inserted    Blood in your urine    Black or tarry stools    Dizziness or shortness of breath     [id number] [employer]. [address] [place] [id number]. All rights reserved. This information is not intended as a substitute for professional medical care. Always follow your healthcare professional's instructions.

## 2017-01-25 ENCOUNTER — APPOINTMENT (OUTPATIENT)
Dept: CARDIOLOGY | Facility: CLINIC | Age: 57
DRG: 247 | End: 2017-01-25
Attending: INTERNAL MEDICINE
Payer: COMMERCIAL

## 2017-01-25 ENCOUNTER — APPOINTMENT (OUTPATIENT)
Dept: OCCUPATIONAL THERAPY | Facility: CLINIC | Age: 57
DRG: 247 | End: 2017-01-25
Attending: INTERNAL MEDICINE
Payer: COMMERCIAL

## 2017-01-25 LAB
ANION GAP SERPL CALCULATED.3IONS-SCNC: 10 MMOL/L (ref 3–14)
BUN SERPL-MCNC: 13 MG/DL (ref 7–30)
CALCIUM SERPL-MCNC: 8.5 MG/DL (ref 8.5–10.1)
CHLORIDE SERPL-SCNC: 104 MMOL/L (ref 94–109)
CHOLEST SERPL-MCNC: 178 MG/DL
CO2 SERPL-SCNC: 23 MMOL/L (ref 20–32)
CREAT SERPL-MCNC: 0.86 MG/DL (ref 0.66–1.25)
ERYTHROCYTE [DISTWIDTH] IN BLOOD BY AUTOMATED COUNT: 13 % (ref 10–15)
GFR SERPL CREATININE-BSD FRML MDRD: ABNORMAL ML/MIN/1.7M2
GLUCOSE SERPL-MCNC: 157 MG/DL (ref 70–99)
HBA1C MFR BLD: 5.8 % (ref 4.3–6)
HCT VFR BLD AUTO: 43.1 % (ref 40–53)
HDLC SERPL-MCNC: 45 MG/DL
HGB BLD-MCNC: 15.5 G/DL (ref 13.3–17.7)
LDLC SERPL CALC-MCNC: 121 MG/DL
MCH RBC QN AUTO: 31.6 PG (ref 26.5–33)
MCHC RBC AUTO-ENTMCNC: 36 G/DL (ref 31.5–36.5)
MCV RBC AUTO: 88 FL (ref 78–100)
NONHDLC SERPL-MCNC: 133 MG/DL
PLATELET # BLD AUTO: 213 10E9/L (ref 150–450)
POTASSIUM SERPL-SCNC: 4.2 MMOL/L (ref 3.4–5.3)
RBC # BLD AUTO: 4.9 10E12/L (ref 4.4–5.9)
SODIUM SERPL-SCNC: 137 MMOL/L (ref 133–144)
TRIGL SERPL-MCNC: 60 MG/DL
TROPONIN I SERPL-MCNC: 130.52 UG/L (ref 0–0.04)
TROPONIN I SERPL-MCNC: 94.25 UG/L (ref 0–0.04)
WBC # BLD AUTO: 13.1 10E9/L (ref 4–11)

## 2017-01-25 PROCEDURE — 85027 COMPLETE CBC AUTOMATED: CPT | Performed by: INTERNAL MEDICINE

## 2017-01-25 PROCEDURE — 25000132 ZZH RX MED GY IP 250 OP 250 PS 637: Performed by: INTERNAL MEDICINE

## 2017-01-25 PROCEDURE — 40000264 ECHO COMPLETE WITH OPTISON

## 2017-01-25 PROCEDURE — 80048 BASIC METABOLIC PNL TOTAL CA: CPT | Performed by: INTERNAL MEDICINE

## 2017-01-25 PROCEDURE — 93010 ELECTROCARDIOGRAM REPORT: CPT | Mod: 76 | Performed by: INTERNAL MEDICINE

## 2017-01-25 PROCEDURE — 40000133 ZZH STATISTIC OT WARD VISIT

## 2017-01-25 PROCEDURE — 27210845 ZZH DEVICE INFLATION CR5

## 2017-01-25 PROCEDURE — 25500064 ZZH RX 255 OP 636: Performed by: INTERNAL MEDICINE

## 2017-01-25 PROCEDURE — 25000125 ZZHC RX 250: Performed by: INTERNAL MEDICINE

## 2017-01-25 PROCEDURE — C1725 CATH, TRANSLUMIN NON-LASER: HCPCS

## 2017-01-25 PROCEDURE — 80061 LIPID PANEL: CPT | Performed by: INTERNAL MEDICINE

## 2017-01-25 PROCEDURE — C1769 GUIDE WIRE: HCPCS

## 2017-01-25 PROCEDURE — 84484 ASSAY OF TROPONIN QUANT: CPT | Performed by: INTERNAL MEDICINE

## 2017-01-25 PROCEDURE — 99233 SBSQ HOSP IP/OBS HIGH 50: CPT | Performed by: INTERNAL MEDICINE

## 2017-01-25 PROCEDURE — 27210759 ZZH DEVICE INFLATION CR6

## 2017-01-25 PROCEDURE — 36415 COLL VENOUS BLD VENIPUNCTURE: CPT | Performed by: INTERNAL MEDICINE

## 2017-01-25 PROCEDURE — C1887 CATHETER, GUIDING: HCPCS

## 2017-01-25 PROCEDURE — 97165 OT EVAL LOW COMPLEX 30 MIN: CPT | Mod: GO

## 2017-01-25 PROCEDURE — 21000001 ZZH R&B HEART CARE

## 2017-01-25 PROCEDURE — 83036 HEMOGLOBIN GLYCOSYLATED A1C: CPT | Performed by: INTERNAL MEDICINE

## 2017-01-25 PROCEDURE — 27210787 ZZH MANIFOLD CR2

## 2017-01-25 PROCEDURE — 27210795 ZZH PAD DEFIB QUICK CR4

## 2017-01-25 PROCEDURE — 27211089 ZZH KIT ACIST INJECTOR CR3

## 2017-01-25 PROCEDURE — 27210946 ZZH KIT HC TOTES DISP CR8

## 2017-01-25 PROCEDURE — 97110 THERAPEUTIC EXERCISES: CPT | Mod: GO

## 2017-01-25 PROCEDURE — 93306 TTE W/DOPPLER COMPLETE: CPT | Mod: 26 | Performed by: INTERNAL MEDICINE

## 2017-01-25 PROCEDURE — 97535 SELF CARE MNGMENT TRAINING: CPT | Mod: GO

## 2017-01-25 PROCEDURE — C1874 STENT, COATED/COV W/DEL SYS: HCPCS

## 2017-01-25 PROCEDURE — 93005 ELECTROCARDIOGRAM TRACING: CPT

## 2017-01-25 RX ORDER — METOPROLOL TARTRATE 50 MG
50 TABLET ORAL 2 TIMES DAILY
Status: DISCONTINUED | OUTPATIENT
Start: 2017-01-25 | End: 2017-01-26

## 2017-01-25 RX ORDER — LISINOPRIL 20 MG/1
20 TABLET ORAL DAILY
Status: DISCONTINUED | OUTPATIENT
Start: 2017-01-26 | End: 2017-01-27 | Stop reason: HOSPADM

## 2017-01-25 RX ORDER — METOPROLOL TARTRATE 25 MG/1
25 TABLET, FILM COATED ORAL ONCE
Status: COMPLETED | OUTPATIENT
Start: 2017-01-25 | End: 2017-01-25

## 2017-01-25 RX ORDER — LISINOPRIL 10 MG/1
10 TABLET ORAL ONCE
Status: COMPLETED | OUTPATIENT
Start: 2017-01-25 | End: 2017-01-25

## 2017-01-25 RX ADMIN — FENTANYL CITRATE 25 MCG: 50 INJECTION, SOLUTION INTRAMUSCULAR; INTRAVENOUS at 05:24

## 2017-01-25 RX ADMIN — ACETAMINOPHEN 650 MG: 325 TABLET, FILM COATED ORAL at 00:54

## 2017-01-25 RX ADMIN — ACETAMINOPHEN 650 MG: 325 TABLET, FILM COATED ORAL at 08:16

## 2017-01-25 RX ADMIN — ACETAMINOPHEN 650 MG: 325 TABLET, FILM COATED ORAL at 04:43

## 2017-01-25 RX ADMIN — TICAGRELOR 90 MG: 90 TABLET ORAL at 08:16

## 2017-01-25 RX ADMIN — METOPROLOL TARTRATE 25 MG: 25 TABLET, FILM COATED ORAL at 08:16

## 2017-01-25 RX ADMIN — ASPIRIN 81 MG: 81 TABLET, COATED ORAL at 08:16

## 2017-01-25 RX ADMIN — METOPROLOL TARTRATE 50 MG: 50 TABLET, FILM COATED ORAL at 21:24

## 2017-01-25 RX ADMIN — LISINOPRIL 10 MG: 10 TABLET ORAL at 14:53

## 2017-01-25 RX ADMIN — ATORVASTATIN CALCIUM 80 MG: 40 TABLET, FILM COATED ORAL at 08:15

## 2017-01-25 RX ADMIN — TICAGRELOR 90 MG: 90 TABLET ORAL at 21:24

## 2017-01-25 RX ADMIN — HUMAN ALBUMIN MICROSPHERES AND PERFLUTREN 2 ML: 10; .22 INJECTION, SOLUTION INTRAVENOUS at 09:30

## 2017-01-25 RX ADMIN — METOPROLOL TARTRATE 25 MG: 25 TABLET, FILM COATED ORAL at 14:53

## 2017-01-25 RX ADMIN — LISINOPRIL 10 MG: 10 TABLET ORAL at 04:43

## 2017-01-25 ASSESSMENT — PAIN DESCRIPTION - DESCRIPTORS
DESCRIPTORS: ACHING

## 2017-01-25 ASSESSMENT — ACTIVITIES OF DAILY LIVING (ADL): PREVIOUS_RESPONSIBILITIES: MEAL PREP;HOUSEKEEPING;LAUNDRY;SHOPPING;YARDWORK;MEDICATION MANAGEMENT;FINANCES;DRIVING;WORK

## 2017-01-25 NOTE — H&P
History and Physical  Mark Butterfield MRN: [id number]  1960  Date of Admission:1/24/2017  Primary care provider: No primary care provider on file.  ___________________________________  CC:  Lg-lateral STEMI  History is obtained from the patient and electronic health record    HPI:  Mark Butterfield is a 56 year old male who presents to the emergency department today via EMS with complaints of chest pain and is found to have an lg-lateral STEMI. EMS states that the patient was at home moving furniture with his brother, when he had a sudden onset of chest pain. EMS states that upon arrival at the patient's home, he rated his pain at 10/10 and as a result was administered 324 mg Aspirin, 4 mg Zofran, 0.4 mg Dilaudid and 3 sprays of Nitrostat which lowered his pain down to 8/10. Heart rate remained in the 80s per EMS. The patient adds that he had shortness of breath, nausea and sweats during the onset of his pain. The patient denies alcohol and drug use. He has a positive family history of premature CAD (father had MI in his 40s). At the time of evaluation, the patient rates his pain as 5/10.    PMH:  No past medical history on file.  PSH:  No past surgical history on file.  SHx:  Social History   Substance Use Topics     Smoking status: Not on file     Smokeless tobacco: Not on file     Alcohol Use: Not on file      Wt Readings from Last 3 Encounters:   01/24/17 92.625 kg (204 lb 3.2 oz)       FHx:  No family history on file.    Allergies:  No Known Allergies    Medications:    (Not in a hospital admission)  ROS:  Negative except as noted in HPI.    PHYSICAL EXAM:  Vitals:  Temp:  [98.3  F (36.8  C)] 98.3  F (36.8  C)  Pulse:  [78] 78  BP: (150)/(104) 150/104 mmHg  SpO2:  [99 %] 99 %  General: In bed, in NAD  HEENT: EOMI, PERRLA, no scleral icterus or injection  CARDIAC: RRR, no m/r/g appreciated. Peripheral pulses 2+  RESP: CTAB, no wheezes, rhonchi or crackles appreciated.  GI: NABS, NT/ND, no  guarding or rebound  EXTREMITIES: NO LE edema, pulses 2+  SKIN: No acute lesions appreciated  NEURO: Alert and oriented X3, CN II-XII grossly intact, no focal neurological deficits noted    DIAGNOSTIC STUDIES  CMPNo lab results found in last 7 days.  CBC  Recent Labs  Lab 01/24/17 2018   WBC 10.8   RBC 5.02   HGB 15.6   HCT 44.4   MCV 88   MCH 31.1   MCHC 35.1   RDW 12.8        INRNo lab results found in last 7 days.  Troponins: 0.0    EKG/strip results:  Lg-lateral STEMI    Cardiac Imaging:  Angiogram.  See procedure note for details.  100% mid LAD thrombotic occlusion  PCI with 3.5x32mm LIZY  POBA of a jailed small caliber D4 with JULIAN 1 flow  85% rPDA stenosis. Will be staged     ASSESSMENT and PLAN:  56 year old M with acute truman risk anterio-lateral STEMI due to 100% mid LAD thrombotic occlusion. Successful PCI of the mid LAD with 3.5x32mm LIZY. He is admitted and has been started on BB, ACEi, Ticagrelor, ASA, high intensity statins. He will have staged PCI of the rPDA.    FEN:   - IVF: NS at 75cc/hour  - Cardiac diet     Code Status:     Discussed with Dr. Melchor Cook MD,MPH [id number]  Cardiovascular Disease Fellow         Physician Supervisory Attestation:   I have reviewed and discussed with Dr. Cook the history, physical and plan and independently interviewed and examined Mark Butterfield and agree with the plan as stated in the note.    Critical care time pre and post procedure: 30 minutes    Chace Roche MD 1/25/2017 7:37 AM

## 2017-01-25 NOTE — ED PROVIDER NOTES
"  History     Chief Complaint:  Chest Pain      HPI   History is supplemented by EMS.    Mark Butterfield is a 56 year old male who presents to the emergency department today via EMS and his wife for evaluation of chest pain. EMS states that the patient was at home moving furniture with his brother-in-law when he had a sudden onset of chest pain. EMS states that upon arrival at the patient's home, he rated his pain at 10/10 and as a result was administered 324 mg Aspirin, 4 mg Zofran, 0.4 mg Dilaudid and 3 sprays of Nitrostat which eventually lowered his pain down to 8/10. Heart rate remained in the 80s per EMS. The patient adds that he had shortness of breath, nausea and sweats with his pain. The patient denies alcohol and drug use as well as a history of smoking. Denies vomiting. At the time of evaluation, the patient rates his pain as 7/10.      Allergies:  Penicillins      Medications:    Simvastatin     Past Medical History:    History reviewed. No pertinent past medical history.    Past Surgical History:    History reviewed. No pertinent past surgical history.    Family History:    History reviewed. No pertinent family history.     Social History:  The patient was accompanied to the ED by EMS and wife.  Smoking Status: negative  Drug use: negative  Alcohol Use: negative     Review of Systems   Constitutional: Positive for diaphoresis.   Respiratory: Positive for shortness of breath.    Cardiovascular: Positive for chest pain.   Gastrointestinal: Positive for nausea. Negative for vomiting.   All other systems reviewed and are negative.    Physical Exam   Vitals:  Patient Vitals for the past 24 hrs:   BP Temp Temp src Pulse Resp SpO2 Height Weight   01/24/17 2026 (!) 146/100 mmHg - - 77 18 100 % - -   01/24/17 2015 (!) 150/104 mmHg 98.3  F (36.8  C) Oral 78 - 99 % 1.88 m (6' 2\") 92.625 kg (204 lb 3.2 oz)      Physical Exam  General: Appears well-developed and well-nourished.   Head: No signs of trauma. "   Mouth/Throat: Oropharynx is clear and moist.   CV: Normal rate and regular rhythm.    Resp: Effort normal and breath sounds normal. No respiratory distress.   GI: Soft. There is no tenderness or guarding.  Normal bowel sounds.  No CVA tenderness.  MSK: Normal range of motion. no edema. No Calf tenderness.  Neuro: The patient is alert and oriented.  Speech normal.  Skin: Skin is warm and diaphoretic.    Psych: normal mood and affect. behavior is normal.      Emergency Department Course     ECG:  ECG taken at 2015, ECG read at 2015  Normal sinus rhythm with sinus arrhythmia   Cannot rule out inferior infarct, age undetermined   Anterolateral injury pattern   ** ** Acute MI / STEMI ** **  Abnormal ECG  Rate 73 bpm. TX interval 162. QRS duration 80. QT/QTc 388/427. P-R-T axes 63 7 -40.      Imaging:  Radiology findings were communicated with the patient who voiced understanding of the findings.    XR Chest Port 1 View  IMPRESSION: No convincing evidence of active cardiopulmonary disease.  Reading per radiology      Laboratory:  Laboratory findings were communicated with the patient who voiced understanding of the findings.    CBC: AWNL. (WBC 10.8, HGB 15.6, )   INR (collected at 2018): 1.02  BMP: glucose 134 (H), calcium 8.3 (L) o/w WNL (Creatinine 1.12)  Troponin POCT (Collected 2023): 0.00  Troponin (Collected 2018): <0.015      Interventions:  2026: Brilinta 180 mg Oral   2027: Heparin 6500 units IV   2028: Morphine 4 mg IV   2034: Morphine 4 mg IV        Emergency Department Course:  2013: Patient arrives via EMS   2014: I performed an exam of the patient as documented above.   2015: IV established. EKG obtained in the ED, see results above.   2020: I spoke with Dr. Roche of the Cardiology service from [facility] regarding patient's presentation, findings, and plan of care.  2021: The patient underwent a chest XR while in the emergency department, results above.   2026: 180 mg Oral Brilinta  administered   2027: 6500 units IV Heparin administered   2028: 4 mg IV Morphine administered   2034: 4 mg IV Morphine administered   The patient is sent to the Cath Lab.   I personally reviewed the laboratory and imaging results with the Patient and spouse and answered all related questions prior to admission.    Impression & Plan      Medical Decision Making:  The patient was seen in the ED today with complaints of chest pain today. He has an EKG concerning for STEMI.      Despite this finding, we considered the following etiologies for the patient's chest pain today:     Aortic dissection: The patient has no pulse deficit on examination. There are no complaints of neurologic impairment. There is no murmur characteristic of aortic regurgitation. There is no widening of the mediastinum on chest x-ray and felt the clinical history was otherwise not suggestive of aortic dissection or aneurysm thus I do not feel the patient needs CT scan of the chest at this time.     Boerhaave syndrome: There is no historical data of severe vomiting, hematemesis and no significant abdominal tenderness on exam. There is no mediastinal air on chest x-ray.     Pericardial effusion/tamponade: There are no muffling of the heart sounds or significant hypotension in the ED. No electrical alternans on EKG.     Pulmonary embolus: The patient has no respiratory distress.    Pneumonia: CXR is clear.  O2 sats are within normal limits.  Lung exam is clear and history is otherwise unconcerning for pneumonia.    STEMI: EKG did show STEMI with reciprocal changes.  he was given ASA, NTG, PGY2 inhibitor, Heparin, and interventional cardiologist was immediately contacted.  Repeat EKG showed significant improvement from initial EKG with resolution of STEs.  Following workup here he was immediately transferred to the closest cath lab for definitive care.      Critical Care time was 40 minutes for this patient excluding procedures.       Diagnosis:     ICD-10-CM    1. ST elevation myocardial infarction (STEMI), unspecified artery (H) I21.3 Basic metabolic panel     Troponin I       Disposition:   The patient is sent to the cath lab.       Scribe Disclosure:  I, Catarina Nguyễn, am serving as a scribe at 8:18 PM on 1/24/2017 to document services personally performed by Gopi Delgadillo MD, based on my observations and the provider's statements to me.    1/24/2017    EMERGENCY DEPARTMENT        Gopi Delgadillo MD  01/25/17 1511

## 2017-01-25 NOTE — PROGRESS NOTES
COMPLICATIONS:  1. None    SUMMARY:   1. Anterior STEMI with 100% thrombotic occlusion of large LAD  2.  Two vessel coronary artery disease (LAD as above and rPDA with 85% stenosis).   3. Successful Percutaneous coronary intervention of the mid LAD using a 3.5x32mm Promus Premier LIZY, post dilated to a 4mm diameter.  4. PTCA with POBA of the ostial D4 using a 2.0mm NC balloon  5. Left ventricle with LVEDP of 17 mmHg, no evidence of aortic stenosis.  6. Sheath was secured in place.    PLAN:   1. Aspirin 81 mg po daily lifelong.  2. Ticagrelor 90 mg po bid for at least 1 year uninterrupted.  3. Bedrest per protocol.  4. Admit   5. Continued medical management and lifestyle modification for cardiovascular risk factor optimization.     The attending interventional cardiologist was present for the entire procedure.    Jane Cook MD,MPH [phone]  Interventional Cardiology Fellow

## 2017-01-25 NOTE — ED NOTES
Bed: ST01  Expected date: 1/24/17  Expected time: 8:01 PM  Means of arrival: Ambulance  Comments:  424 stemi 56 male

## 2017-01-25 NOTE — CONSULTS
NUTRITION EDUCATION    REASON FOR ASSESSMENT:  Nutrition education on American Heart Association (AHA) Heart Healthy Diet.    NUTRITION HISTORY:  Information obtained from patient and wife Latricia.  Unrestricted diet at home.  Breakfast:  Skips  Lunch:  Hot dogs or sandwich, soup, chips  PM snack:  Chocolate, candy  Dinner:  Meat, potato, vegetable  HS snack:  Popcorn with butter, chips, ice cream, cookies.  Pt states he drinks 4-5 bottles of Pepsi per day (assume 20 oz as he said it was larger than the 12 oz can).  He also drinks quite a bit of water throughout the day.  Pt salts his food prior to tasting.  He does not care for fruits or many vegetables.  He does not exercise regularly, but does a lot of yard work during the summer at home and at the [employer].  He walks a lot on his job, sometimes 3-4 miles.  He uses stairs a lot.  Pt would like to lose ~ 30 lbs.  Note weight in computer is incorrect - 92.6 kg (204 lbs).  Pt states he weighs 253 kg.  Thus correct BMI 32.6 (133% IBW).    CURRENT DIET ORDER:  CL    NUTRITION DIAGNOSIS:  Food- and nutrition-related knowledge deficit R/t no prior knowledge provided AEB diet high in sodium, sugar, and saturated fats.     INTERVENTIONS:  Nutrition Prescription:    Recommended AHA Heart Healthy Diet    Implementation:     Nutrition Education (Content):  a) reviewed the Nutrition chapter in the  Understanding Artery Disease  binder  b) reviewed AHA Heart Healthy Diet guidelines  c) provided additional Healthy Heart diet handouts - Tips to Lower Sodium, Low Sodium Foods and Drinks, Seasoning your Food without Salt, and Mediterranean Diet.    Nutrition Education (Application):  a) Discussed current eating habits and recommended alternative food choices.  Gave suggestions for food ideas in the morning.  Gave recommendations for heart healthy snacks.  Pt to consider walking for exercise.  He understands the need to avoid large Pepsi intake and will consider alternative  beverages (non sugared sparkling water).    Anticipate good compliance    Diet Education - refer to Education flowsheet    Goals:    Patient verbalizes understanding of diet by stating he knows he needs to make lifestyle changes, eat more healthy, and lose weight.      All of the above goals met during education session    Follow Up/Monitoring:    Provided RD contact information for future questions    Dorothea Waters, RD, LD, CNSC

## 2017-01-25 NOTE — PROGRESS NOTES
01/25/17 1400   Quick Adds   Type of Visit Initial Occupational Therapy Evaluation   Living Environment   Lives With spouse   Living Arrangements house   Home Accessibility stairs to enter home;stairs within home   Number of Stairs to Enter Home 2   Number of Stairs Within Home 13   Transportation Available car;family or friend will provide   Self-Care   Dominant Hand right   Usual Activity Tolerance good   Current Activity Tolerance fair   Regular Exercise no   Equipment Currently Used at Home none   Functional Level Prior   Ambulation 0-->independent   Transferring 0-->independent   Toileting 0-->independent   Bathing 0-->independent   Dressing 0-->independent   Eating 0-->independent   Communication 0-->understands/communicates without difficulty   Swallowing 0-->swallows foods/liquids without difficulty   Cognition 0 - no cognition issues reported   Fall history within last six months no   Which of the above functional risks had a recent onset or change? 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   General Information   Onset of Illness/Injury or Date of Surgery - Date 01/24/17   Referring Physician Dr. Mercedes   Patient/Family Goals Statement return home w/sppuse and attend OPCR 2 at [facility]   Additional Occupational Profile Info/Pertinent History of Current Problem 57yo male admitted with STEMI and found to have thromotic occlusion to LAD and rPDA. Underwent PCI to mLad with plan for future stent to rPDA (85% stenosed). Mild-mod LV systolic dysfunction EF 40-45%.  PMH includes HTN, HLP.   Heart Disease Risk Factors High blood pressure;Lack of physical activity;Dislipidemia;Overweight;Family history;Gender   Cognitive Status Examination   Orientation orientation to person, place and time   Level of Consciousness alert   Able to Follow Commands WNL/WFL   Memory intact   Visual Perception   Visual Perception No deficits  "were identified   Pain Assessment   Patient Currently in Pain Yes, see Vital Sign flowsheet   Range of Motion (ROM)   ROM Quick Adds No deficits were identified   Strength   Manual Muscle Testing Quick Adds No deficits were identified   Coordination   Upper Extremity Coordination No deficits were identified   Mobility   Bed Mobility Comments Indep   Toilet Transfer   Toilet Transfer Comments Indep   Balance   Balance Comments Indep   Instrumental Activities of Daily Living (IADL)   Previous Responsibilities meal prep;housekeeping;laundry;shopping;yardwork;medication management;finances;driving;work   Activities of Daily Living Analysis   ADL Comments Indep with basic self-cares in ICU room   General Therapy Interventions   Planned Therapy Interventions ADL retraining;home program guidelines;progressive activity/exercise;risk factor education   Clinical Impression   Criteria for Skilled Therapeutic Interventions Met yes, treatment indicated   OT Diagnosis decreased ADL/IADL performance   Influenced by the following impairments strength/endurance   Assessment of Occupational Performance 1-3 Performance Deficits   Identified Performance Deficits home mgmt, work and leisure skills   Clinical Decision Making (Complexity) Low complexity   Therapy Frequency 2 times/day   Predicted Duration of Therapy Intervention (days/wks) 5 days   Anticipated Discharge Disposition Home with Outpatient Therapy   Risks and Benefits of Treatment have been explained. Yes   Patient, Family & other staff in agreement with plan of care Yes   [facility] TM \"6 Clicks\"   2016, Trustees of [facility], under license to [employer].  All rights reserved.   6 Clicks Short Forms Daily Activity Inpatient Short Form   [facility]  \"6 Clicks\" Daily Activity Inpatient Short Form   1. Putting on and taking off regular lower body clothing? 4 - None   2. Bathing (including washing, rinsing, drying)? 3 - A Little   3. " Toileting, which includes using toilet, bedpan or urinal? 4 - None   4. Putting on and taking off regular upper body clothing? 4 - None   5. Taking care of personal grooming such as brushing teeth? 4 - None   6. Eating meals? 4 - None   Daily Activity Raw Score (Score out of 24.Lower scores equate to lower levels of function) 23   Total Evaluation Time   Total Evaluation Time (Minutes) 15

## 2017-01-25 NOTE — PLAN OF CARE
Problem: Goal Outcome Summary  Goal: Goal Outcome Summary  OT eval completed and treatment initiated. Pt seen POD #1 of PCI to mLAD following admit with STEMI w/findings of 2 vessel disease. Plan for future intervention of rPDA.  At baseline, pt lives with spouse, works full-time, is indep ADL/IADL and all mobilities.  Today pt seen in ICU and up ambulating indep in room. Tolerated 2min standing cals.  Prior to activity at rest /118 and increased to 173/121 with the exercise, HR  however pt denied fatigue or any other symptoms.  OT/CR to see pt daily to advance activity with monitoring of cardiac response and for education in post-MI/CAD risk factor and S/S mgmt, activity progression, HEP. Pt agreeable to attending OPCR 2 program here at [facility] per MD orders.

## 2017-01-25 NOTE — PROVIDER NOTIFICATION
MD Notification    Notified Person:  MD    Notified Persons Name: Dr Mtz, Cardiology    Notification Date/Time: 1/25 0050    Notification Interaction:  Talked with Physician    Purpose of Notification: HTN, DBP 100s.      Orders Received:  Continue to monitor BP, no new orders.      Comments:  Regarding sheath pull at 0200, Dr Mtz recommended to wait until morning to pull sheath.

## 2017-01-25 NOTE — PROGRESS NOTES
Neuro intact.  On 2L NC, LS Clear.  SR 70s-80s.  BP 150s/100s. Clear liquid diet.  Good UOP.  Sheath pulled at 0545.  Site CDI.  CMS good.  Wife updated at bedside.

## 2017-01-25 NOTE — PROGRESS NOTES
"       Assessment and Plan:     1. Acute anterolateral STEMI. Initial troponin normal. Up to 130, 9 hours after presentation to ER (due to washout).   2. CAD with thrombotic occlusion of mid LAD just after ostium of large D1. Treated with emergent stenting using LIZY within an hour of chest pain onset. Severe narrowing of small diagonal ostium due to LAD stent. Mild LCX disease. Severe 90% mid PDA stenosis.   3. Mild to moderate LV systolic dysfunction, EF 40-45%   4. HTN, previously untreated. Elevated BP's  5. Dyslipidemia. Simva changed to atorva 80  6. Bilat arm pain. Could be due to angina from diagonal stenosis or from blood pressure cuff which is very painful for him. No sign of recurrent anterior MI by ECG criteria.    Will increase metoprolol and lisinopril  Follow troponins due to arm pain.   [place] [facility].    Tomas Joe MD          Interval History:   Aching bilateral arm pain          Medications:       metoprolol  50 mg Oral BID     [START ON 1/26/2017] lisinopril  20 mg Oral Daily     metoprolol  25 mg Oral Once     lisinopril  10 mg Oral Once     sodium chloride (PF)  3 mL Intracatheter Q8H     aspirin EC  81 mg Oral Daily     ticagrelor  90 mg Oral Q12H     atorvastatin  80 mg Oral Daily            Physical Exam:   Blood pressure 150/95, pulse 77, temperature 98  F (36.7  C), temperature source Oral, resp. rate 5, height 1.88 m (6' 2\"), weight 92.625 kg (204 lb 3.2 oz), SpO2 96 %.  Filed Vitals:    01/24/17 2015   Weight: 92.625 kg (204 lb 3.2 oz)         Intake/Output Summary (Last 24 hours) at 01/25/17 1238  Last data filed at 01/25/17 0700   Gross per 24 hour   Intake 646.03 ml   Output   1600 ml   Net -953.97 ml   .    Exam:  GENERAL APPEARANCE ADULT: Alert, in no acute distress  RESP: lungs clear to auscultation   CV: regular rate and rhythm, no murmur, rub, or gallop  ABDOMEN: normal bowel sounds, soft, nontender, no hepatosplenomegaly or other masses  EXTREMITIES: No " edema         Data:   LABS (Last four results)  CMP  Recent Labs  Lab 01/25/17  0505 01/24/17 2018    139   POTASSIUM 4.2 5.1   CHLORIDE 104 107   CO2 23 24   ANIONGAP 10 8   * 134*   BUN 13 15   CR 0.86 1.12   GFRESTIMATED >90Non  GFR Calc 68   GFRESTBLACK >90African American GFR Calc 82   LUCA 8.5 8.3*     CBC  Recent Labs  Lab 01/25/17  0505 01/24/17 2018   WBC 13.1* 10.8   RBC 4.90 5.02   HGB 15.5 15.6   HCT 43.1 44.4   MCV 88 88   MCH 31.6 31.1   MCHC 36.0 35.1   RDW 13.0 12.8    210     INR  Recent Labs  Lab 01/24/17 2018   INR 1.02     TROPONINS Lab Results   Component Value Date    TROPI  01/24/2017     <0.015  The 99th percentile for upper reference range is 0.045 ug/L.  Troponin values in   the range of 0.045 - 0.120 ug/L may be associated with risks of adverse   clinical events.      TROPONIN 0.00 01/24/2017                                                                                                               LISA PARDO MD

## 2017-01-26 ENCOUNTER — APPOINTMENT (OUTPATIENT)
Dept: OCCUPATIONAL THERAPY | Facility: CLINIC | Age: 57
DRG: 247 | End: 2017-01-26
Payer: COMMERCIAL

## 2017-01-26 LAB
LACTATE BLD-SCNC: 1.5 MMOL/L (ref 0.7–2.1)
TROPONIN I SERPL-MCNC: 41.94 UG/L (ref 0–0.04)

## 2017-01-26 PROCEDURE — 36415 COLL VENOUS BLD VENIPUNCTURE: CPT | Performed by: INTERNAL MEDICINE

## 2017-01-26 PROCEDURE — 99232 SBSQ HOSP IP/OBS MODERATE 35: CPT | Performed by: INTERNAL MEDICINE

## 2017-01-26 PROCEDURE — 40000133 ZZH STATISTIC OT WARD VISIT

## 2017-01-26 PROCEDURE — 84484 ASSAY OF TROPONIN QUANT: CPT | Performed by: INTERNAL MEDICINE

## 2017-01-26 PROCEDURE — 25000132 ZZH RX MED GY IP 250 OP 250 PS 637: Performed by: PHYSICIAN ASSISTANT

## 2017-01-26 PROCEDURE — 25000132 ZZH RX MED GY IP 250 OP 250 PS 637: Performed by: INTERNAL MEDICINE

## 2017-01-26 PROCEDURE — 83605 ASSAY OF LACTIC ACID: CPT | Performed by: PHYSICIAN ASSISTANT

## 2017-01-26 PROCEDURE — 36415 COLL VENOUS BLD VENIPUNCTURE: CPT | Performed by: PHYSICIAN ASSISTANT

## 2017-01-26 PROCEDURE — 97110 THERAPEUTIC EXERCISES: CPT | Mod: GO

## 2017-01-26 PROCEDURE — 97110 THERAPEUTIC EXERCISES: CPT | Mod: GO | Performed by: OCCUPATIONAL THERAPIST

## 2017-01-26 PROCEDURE — 21000001 ZZH R&B HEART CARE

## 2017-01-26 PROCEDURE — 40000133 ZZH STATISTIC OT WARD VISIT: Performed by: OCCUPATIONAL THERAPIST

## 2017-01-26 RX ORDER — CARVEDILOL 12.5 MG/1
12.5 TABLET ORAL 2 TIMES DAILY WITH MEALS
Status: DISCONTINUED | OUTPATIENT
Start: 2017-01-26 | End: 2017-01-27 | Stop reason: HOSPADM

## 2017-01-26 RX ORDER — METOPROLOL TARTRATE 25 MG/1
25 TABLET, FILM COATED ORAL ONCE
Status: COMPLETED | OUTPATIENT
Start: 2017-01-26 | End: 2017-01-26

## 2017-01-26 RX ADMIN — CARVEDILOL 12.5 MG: 12.5 TABLET, FILM COATED ORAL at 18:54

## 2017-01-26 RX ADMIN — METOPROLOL TARTRATE 50 MG: 50 TABLET, FILM COATED ORAL at 08:31

## 2017-01-26 RX ADMIN — ASPIRIN 81 MG: 81 TABLET, COATED ORAL at 08:31

## 2017-01-26 RX ADMIN — ATORVASTATIN CALCIUM 80 MG: 40 TABLET, FILM COATED ORAL at 08:31

## 2017-01-26 RX ADMIN — TICAGRELOR 90 MG: 90 TABLET ORAL at 21:05

## 2017-01-26 RX ADMIN — TICAGRELOR 90 MG: 90 TABLET ORAL at 08:31

## 2017-01-26 RX ADMIN — LISINOPRIL 20 MG: 20 TABLET ORAL at 08:31

## 2017-01-26 RX ADMIN — METOPROLOL TARTRATE 25 MG: 25 TABLET, FILM COATED ORAL at 11:00

## 2017-01-26 NOTE — PROGRESS NOTES
[facility]  Cardiology Progress Note  Date of Service: 01/26/2017  Primary Cardiologist:    Assessment and Plan   Mark Butterfield is a 56 year old male with past medical history significant for strong family hx of CAD admitted on 1/24/2017 with severe chest pain while moving furniture with his brother and found to have a STEMI.      Assessment:  1. Acute anterolateral STEMI, s/p LIZY to large mid LAD, POBA to ostial D4    - remaining 85% mid rPDA, and 30% circ lesion, LVEDP 17   - trop peak at 130   - on brillanta, asa, statin, acei, bb, nitro    2.  Ischemic cardiomyopathy, EF 40-45%   - started on lisinopril, lopressor- cr, k+ stable    3.  HTN, elevated    4.  Dyslipidemia, goal LDL <70   - lipitor 80 initiated    Plan:   1. Increase lopressor to 75 mg bid, 25 mg additional dose now- may require carvedilol if bp remains elevated  2. Cardiac rehab  3. Npo after midnight for staged pci of rpda tomorrow  Risks and benefits of coronary angiogram discussed today including, bleeding, bruising, infection, allergic reaction, kidney damage (including need for dialysis), stroke, heart attack, vascular damage, emergency open heart surgery, up to and including death.  Patient indicates understanding and is agreeable to proceed.       Maria Victoria Shields PA-C  [facility]  Pager: [phone]     Interval History  No additional cp, sob or diaphoresis.  Had feeling of doom with MI- willing to make lifestyle changes.  No orthopnea or pnd.        Physical Exam  Temp: 98.8  F (37.1  C) Temp src: Oral BP: 129/85 mmHg Pulse: 86 Heart Rate: 112 Resp: 17 SpO2: 95 % O2 Device: None (Room air)    Filed Vitals:    01/24/17 2015 01/26/17 0500   Weight: 92.625 kg (204 lb 3.2 oz) 117.4 kg (258 lb 13.1 oz)       Constitutional   alert and oriented, in no acute distress.  Skin   warm and dry to touch  ENT   no pallor or cyanosis  Neck  no carotid bruit  Lungs ctab, no wheezes, rales or rhonchi  Cardiac rrr, no murmur  Abdomen    abdomen soft, bowel sounds normoactive, no hepatosplenomegaly  Extremities and Back   no clubbing, cyanosis. no edema.  2+ femoral pulse  no bruit  Neurological   no gross motor deficits noted, affect appropriate, oriented to time, person and place.    Medications    Percutaneous Coronary Intervention orders placed (this is information for BPA alerting)         metoprolol  50 mg Oral BID     lisinopril  20 mg Oral Daily     sodium chloride (PF)  3 mL Intracatheter Q8H     aspirin EC  81 mg Oral Daily     ticagrelor  90 mg Oral Q12H     atorvastatin  80 mg Oral Daily       Data  Most Recent 3 CBC's:  Recent Labs   Lab Test  01/25/17   0505  01/24/17 2018   WBC  13.1*  10.8   HGB  15.5  15.6   MCV  88  88   PLT  213  210     Most Recent 3 BMP's:  Recent Labs   Lab Test  01/25/17   0505  01/24/17 2018   NA  137  139   POTASSIUM  4.2  5.1   CHLORIDE  104  107   CO2  23  24   BUN  13  15   CR  0.86  1.12   ANIONGAP  10  8   LUCA  8.5  8.3*   GLC  157*  134*     Most Recent 3 Troponin's:  Recent Labs   Lab Test  01/26/17   0608  01/25/17   1258  01/25/17   0505  01/24/17   2023   TROPI  41.939*  94.253*  130.517*   --    TROPONIN   --    --    --   0.00     Last 24 hours labs reviewed   EKG: (reviewed personally) ant st elevation    Imaging: portable cxr 1/24- neg    Tele: sinus    Echo: 1/25/17   Mild to moderate aortic root dilatation, 4.3 cm.  There is severe mid and distal anterior/septal, and apical wall hypokinesis.  The visual ejection fraction is estimated at 40-45%.  There is mild concentric left ventricular hypertrophy.  The transmitral spectral Doppler flow pattern is suggestive of impaired LV relaxation.  Contrast was used without apparent complications.    Last ischemic eval: 1/24/17  1. Anterior STEMI with 100% thrombotic occlusion of large LAD  2.  Two vessel coronary artery disease (LAD as above and rPDA with 85%  stenosis).   3. Successful Percutaneous coronary intervention of the mid LAD using  a  3.5x32mm Promus Premier LIZY, post dilated to a 4mm diameter.  4. PTCA with POBA of the ostial D4 using a 2.0mm NC balloon  5. Left ventricle with LVEDP of 17 mmHg, no evidence of aortic  stenosis.  6. Sheath was secured in place.    Device: none

## 2017-01-26 NOTE — PLAN OF CARE
Problem: Goal Outcome Summary  Goal: Goal Outcome Summary  OT/CR: Pt maintained stable CV response during exercise on treadmill for 21 mins at 2.0 mph (2.5 METS). No complaints of signs/symptoms. Decreased frequency to once daily. Recommend discharge home with increased A from family for IADLs as needed and OP CR at [facility].

## 2017-01-26 NOTE — PLAN OF CARE
Problem: Goal Outcome Summary  Goal: Goal Outcome Summary  Outcome: Improving  A&OX4.  Up independently.  Voiding in the bathroom.  Denies pain/SOB.  Tele NSR.  Right groin intact and no bruit present.

## 2017-01-26 NOTE — PHARMACY
Brilinta check per MD    $329 / month  We can offer patient a free trial voucher for the first fill. This pt is also eligible for the  copay assistance card for refills (available to patients with commercial insurance).    (ran Effient since a high copay but even more expensive - $420)    Patient is meeting a $6500 deductible and this is the first part of the deductible so far this new insurance year.    Thank you,  Luis Enrique Romero Shantel  [facility] Discharge Pharmacy Liaison  [phone]

## 2017-01-26 NOTE — PROGRESS NOTES
AOx4. VSS. SR with BBB. Transferred from ICU post angio, R groin site WDL, no bruit. Lung sounds diminished. Independent. Started on Metoprolol and Brilinta. Pt is overwhelmed with new dx and meds. Education needed on health situation, lifestyle, and meds. Plan is to continue with new med regimen, follow trops (peaked at 130), and eventually cardiac rehab.

## 2017-01-26 NOTE — PLAN OF CARE
Problem: Goal Outcome Summary  Goal: Goal Outcome Summary  OT/CR: Pt ambulated approx 120 ft x2, completed TDM with gradual increase to 1.8 mph for 14 mins and 15 steps, pt with tachy HR increasing to 127 on TDM and with stairs increasing to 134, BP increased from 129/85 at rest to 140/100 while on TDM. Pt with no reported symptoms. Initiated education in CAD risk factors. Recommend home with OP CR at [facility] at discharge.

## 2017-01-26 NOTE — PLAN OF CARE
Problem: Individualization  Goal: Patient Preferences  Outcome: Improving  Patient transferring to [facility] room 258-2.  Report called to ANNE MARIE Abdi.    Patient remains alert and orientated.  C/o intermittent right arm pain-suspect from frequent BP checks.  Worked with cardiac rehab.  Up independently.  Steady gait.    Remains SR without ectopy.  BP high 170s/100s.  Patient reports this is baseline.  Cardiology is aware and ordered an extra dose of Metoprolol and Lisinopril.  No PRNs.  Good pulses.  Right femoral angiogram site-remains CDI, no hematoma.     Lungs diminished.  On RA with O2Sats mid 90s.    Patient on cardiac diet.  Tolerating.  Reports low appetite.      Voiding.  IVF stopped.  SL x2.      Wife and children at bedside.  Supportive of patient.

## 2017-01-27 ENCOUNTER — TRANSFERRED RECORDS (OUTPATIENT)
Dept: CARDIOLOGY | Facility: CLINIC | Age: 57
End: 2017-01-27

## 2017-01-27 ENCOUNTER — APPOINTMENT (OUTPATIENT)
Dept: OCCUPATIONAL THERAPY | Facility: CLINIC | Age: 57
DRG: 247 | End: 2017-01-27
Payer: COMMERCIAL

## 2017-01-27 ENCOUNTER — APPOINTMENT (OUTPATIENT)
Dept: CARDIOLOGY | Facility: CLINIC | Age: 57
DRG: 247 | End: 2017-01-27
Attending: PHYSICIAN ASSISTANT
Payer: COMMERCIAL

## 2017-01-27 VITALS
WEIGHT: 257.28 LBS | HEIGHT: 74 IN | OXYGEN SATURATION: 97 % | RESPIRATION RATE: 16 BRPM | TEMPERATURE: 98.3 F | DIASTOLIC BLOOD PRESSURE: 85 MMHG | BODY MASS INDEX: 33.02 KG/M2 | SYSTOLIC BLOOD PRESSURE: 148 MMHG | HEART RATE: 110 BPM

## 2017-01-27 PROBLEM — I10 BENIGN ESSENTIAL HTN: Status: ACTIVE | Noted: 2017-01-27

## 2017-01-27 PROBLEM — I25.5 ISCHEMIC CARDIOMYOPATHY: Status: ACTIVE | Noted: 2017-01-27

## 2017-01-27 PROBLEM — E78.5 DYSLIPIDEMIA, GOAL LDL BELOW 70: Status: ACTIVE | Noted: 2017-01-27

## 2017-01-27 LAB
INTERPRETATION ECG - MUSE: NORMAL

## 2017-01-27 PROCEDURE — 25000132 ZZH RX MED GY IP 250 OP 250 PS 637: Performed by: INTERNAL MEDICINE

## 2017-01-27 PROCEDURE — 93454 CORONARY ARTERY ANGIO S&I: CPT

## 2017-01-27 PROCEDURE — 40000133 ZZH STATISTIC OT WARD VISIT: Performed by: OCCUPATIONAL THERAPIST

## 2017-01-27 PROCEDURE — C1725 CATH, TRANSLUMIN NON-LASER: HCPCS

## 2017-01-27 PROCEDURE — 99153 MOD SED SAME PHYS/QHP EA: CPT | Mod: GC | Performed by: INTERNAL MEDICINE

## 2017-01-27 PROCEDURE — 27210787 ZZH MANIFOLD CR2

## 2017-01-27 PROCEDURE — 27210759 ZZH DEVICE INFLATION CR6

## 2017-01-27 PROCEDURE — 93010 ELECTROCARDIOGRAM REPORT: CPT | Performed by: INTERNAL MEDICINE

## 2017-01-27 PROCEDURE — 97110 THERAPEUTIC EXERCISES: CPT | Mod: GO | Performed by: OCCUPATIONAL THERAPIST

## 2017-01-27 PROCEDURE — C1769 GUIDE WIRE: HCPCS

## 2017-01-27 PROCEDURE — C1874 STENT, COATED/COV W/DEL SYS: HCPCS

## 2017-01-27 PROCEDURE — B2111ZZ FLUOROSCOPY OF MULTIPLE CORONARY ARTERIES USING LOW OSMOLAR CONTRAST: ICD-10-PCS | Performed by: INTERNAL MEDICINE

## 2017-01-27 PROCEDURE — 27210795 ZZH PAD DEFIB QUICK CR4

## 2017-01-27 PROCEDURE — C1760 CLOSURE DEV, VASC: HCPCS

## 2017-01-27 PROCEDURE — 92928 PRQ TCAT PLMT NTRAC ST 1 LES: CPT | Mod: RC | Performed by: INTERNAL MEDICINE

## 2017-01-27 PROCEDURE — 25000128 H RX IP 250 OP 636: Performed by: INTERNAL MEDICINE

## 2017-01-27 PROCEDURE — 27210946 ZZH KIT HC TOTES DISP CR8

## 2017-01-27 PROCEDURE — C1887 CATHETER, GUIDING: HCPCS

## 2017-01-27 PROCEDURE — 99153 MOD SED SAME PHYS/QHP EA: CPT

## 2017-01-27 PROCEDURE — 99238 HOSP IP/OBS DSCHRG MGMT 30/<: CPT | Mod: 25 | Performed by: INTERNAL MEDICINE

## 2017-01-27 PROCEDURE — C9600 PERC DRUG-EL COR STENT SING: HCPCS

## 2017-01-27 PROCEDURE — 99152 MOD SED SAME PHYS/QHP 5/>YRS: CPT

## 2017-01-27 PROCEDURE — 93005 ELECTROCARDIOGRAM TRACING: CPT

## 2017-01-27 PROCEDURE — 25000125 ZZHC RX 250: Performed by: INTERNAL MEDICINE

## 2017-01-27 PROCEDURE — 27211089 ZZH KIT ACIST INJECTOR CR3

## 2017-01-27 PROCEDURE — 99152 MOD SED SAME PHYS/QHP 5/>YRS: CPT | Mod: GC | Performed by: INTERNAL MEDICINE

## 2017-01-27 PROCEDURE — 3E033PZ INTRODUCTION OF PLATELET INHIBITOR INTO PERIPHERAL VEIN, PERCUTANEOUS APPROACH: ICD-10-PCS | Performed by: INTERNAL MEDICINE

## 2017-01-27 PROCEDURE — 027034Z DILATION OF CORONARY ARTERY, ONE ARTERY WITH DRUG-ELUTING INTRALUMINAL DEVICE, PERCUTANEOUS APPROACH: ICD-10-PCS | Performed by: INTERNAL MEDICINE

## 2017-01-27 RX ORDER — POTASSIUM CHLORIDE 1500 MG/1
20 TABLET, EXTENDED RELEASE ORAL
Status: DISCONTINUED | OUTPATIENT
Start: 2017-01-27 | End: 2017-01-27 | Stop reason: HOSPADM

## 2017-01-27 RX ORDER — LORAZEPAM 0.5 MG/1
0.5 TABLET ORAL
Status: DISCONTINUED | OUTPATIENT
Start: 2017-01-27 | End: 2017-01-27 | Stop reason: HOSPADM

## 2017-01-27 RX ORDER — FLUMAZENIL 0.1 MG/ML
0.2 INJECTION, SOLUTION INTRAVENOUS
Status: DISCONTINUED | OUTPATIENT
Start: 2017-01-27 | End: 2017-01-27 | Stop reason: HOSPADM

## 2017-01-27 RX ORDER — BUPIVACAINE HYDROCHLORIDE 2.5 MG/ML
1-10 INJECTION, SOLUTION EPIDURAL; INFILTRATION; INTRACAUDAL
Status: DISCONTINUED | OUTPATIENT
Start: 2017-01-27 | End: 2017-01-27 | Stop reason: HOSPADM

## 2017-01-27 RX ORDER — LISINOPRIL 20 MG/1
20 TABLET ORAL DAILY
Qty: 90 TABLET | Refills: 3 | Status: SHIPPED | OUTPATIENT
Start: 2017-01-27 | End: 2017-02-03

## 2017-01-27 RX ORDER — NALOXONE HYDROCHLORIDE 0.4 MG/ML
0.4 INJECTION, SOLUTION INTRAMUSCULAR; INTRAVENOUS; SUBCUTANEOUS EVERY 5 MIN PRN
Status: DISCONTINUED | OUTPATIENT
Start: 2017-01-27 | End: 2017-01-27 | Stop reason: HOSPADM

## 2017-01-27 RX ORDER — PRASUGREL 10 MG/1
10-60 TABLET, FILM COATED ORAL
Status: DISCONTINUED | OUTPATIENT
Start: 2017-01-27 | End: 2017-01-27 | Stop reason: HOSPADM

## 2017-01-27 RX ORDER — PROTAMINE SULFATE 10 MG/ML
1-5 INJECTION, SOLUTION INTRAVENOUS
Status: DISCONTINUED | OUTPATIENT
Start: 2017-01-27 | End: 2017-01-27 | Stop reason: HOSPADM

## 2017-01-27 RX ORDER — SODIUM CHLORIDE 9 MG/ML
INJECTION, SOLUTION INTRAVENOUS CONTINUOUS
Status: DISCONTINUED | OUTPATIENT
Start: 2017-01-27 | End: 2017-01-27 | Stop reason: HOSPADM

## 2017-01-27 RX ORDER — DIPHENHYDRAMINE HYDROCHLORIDE 50 MG/ML
25-50 INJECTION INTRAMUSCULAR; INTRAVENOUS
Status: DISCONTINUED | OUTPATIENT
Start: 2017-01-27 | End: 2017-01-27 | Stop reason: HOSPADM

## 2017-01-27 RX ORDER — CLOPIDOGREL BISULFATE 75 MG/1
300-600 TABLET ORAL
Status: DISCONTINUED | OUTPATIENT
Start: 2017-01-27 | End: 2017-01-27 | Stop reason: HOSPADM

## 2017-01-27 RX ORDER — FENTANYL CITRATE 50 UG/ML
25-50 INJECTION, SOLUTION INTRAMUSCULAR; INTRAVENOUS
Status: DISCONTINUED | OUTPATIENT
Start: 2017-01-27 | End: 2017-01-27 | Stop reason: HOSPADM

## 2017-01-27 RX ORDER — ASPIRIN 325 MG
325 TABLET ORAL
Status: DISCONTINUED | OUTPATIENT
Start: 2017-01-27 | End: 2017-01-27 | Stop reason: HOSPADM

## 2017-01-27 RX ORDER — NITROGLYCERIN 5 MG/ML
100-200 VIAL (ML) INTRAVENOUS
Status: DISCONTINUED | OUTPATIENT
Start: 2017-01-27 | End: 2017-01-27 | Stop reason: HOSPADM

## 2017-01-27 RX ORDER — NITROGLYCERIN 0.4 MG/1
0.4 TABLET SUBLINGUAL EVERY 5 MIN PRN
Qty: 25 TABLET | Refills: 3 | Status: SHIPPED | OUTPATIENT
Start: 2017-01-27 | End: 2017-02-13

## 2017-01-27 RX ORDER — HYDROCODONE BITARTRATE AND ACETAMINOPHEN 5; 325 MG/1; MG/1
1-2 TABLET ORAL EVERY 4 HOURS PRN
Status: DISCONTINUED | OUTPATIENT
Start: 2017-01-27 | End: 2017-01-27

## 2017-01-27 RX ORDER — ATORVASTATIN CALCIUM 80 MG/1
80 TABLET, FILM COATED ORAL AT BEDTIME
Qty: 90 TABLET | Refills: 3 | Status: SHIPPED | OUTPATIENT
Start: 2017-01-27 | End: 2017-02-13

## 2017-01-27 RX ORDER — HEPARIN SODIUM 1000 [USP'U]/ML
1000-10000 INJECTION, SOLUTION INTRAVENOUS; SUBCUTANEOUS EVERY 5 MIN PRN
Status: DISCONTINUED | OUTPATIENT
Start: 2017-01-27 | End: 2017-01-27 | Stop reason: HOSPADM

## 2017-01-27 RX ORDER — ONDANSETRON 2 MG/ML
4 INJECTION INTRAMUSCULAR; INTRAVENOUS EVERY 4 HOURS PRN
Status: DISCONTINUED | OUTPATIENT
Start: 2017-01-27 | End: 2017-01-27 | Stop reason: HOSPADM

## 2017-01-27 RX ORDER — SODIUM NITROPRUSSIDE 25 MG/ML
100-200 INJECTION INTRAVENOUS
Status: DISCONTINUED | OUTPATIENT
Start: 2017-01-27 | End: 2017-01-27 | Stop reason: HOSPADM

## 2017-01-27 RX ORDER — CLOPIDOGREL BISULFATE 75 MG/1
75 TABLET ORAL
Status: DISCONTINUED | OUTPATIENT
Start: 2017-01-27 | End: 2017-01-27 | Stop reason: HOSPADM

## 2017-01-27 RX ORDER — VERAPAMIL HYDROCHLORIDE 2.5 MG/ML
1-2.5 INJECTION, SOLUTION INTRAVENOUS
Status: DISCONTINUED | OUTPATIENT
Start: 2017-01-27 | End: 2017-01-27 | Stop reason: HOSPADM

## 2017-01-27 RX ORDER — LORAZEPAM 2 MG/ML
0.5 INJECTION INTRAMUSCULAR
Status: DISCONTINUED | OUTPATIENT
Start: 2017-01-27 | End: 2017-01-27 | Stop reason: HOSPADM

## 2017-01-27 RX ORDER — ASPIRIN 81 MG/1
81-324 TABLET, CHEWABLE ORAL
Status: DISCONTINUED | OUTPATIENT
Start: 2017-01-27 | End: 2017-01-27 | Stop reason: HOSPADM

## 2017-01-27 RX ORDER — SODIUM CHLORIDE 9 MG/ML
INJECTION, SOLUTION INTRAVENOUS CONTINUOUS
Status: ACTIVE | OUTPATIENT
Start: 2017-01-27 | End: 2017-01-27

## 2017-01-27 RX ORDER — NITROGLYCERIN 20 MG/100ML
.07-1.71 INJECTION INTRAVENOUS CONTINUOUS PRN
Status: DISCONTINUED | OUTPATIENT
Start: 2017-01-27 | End: 2017-01-27 | Stop reason: HOSPADM

## 2017-01-27 RX ORDER — LIDOCAINE 40 MG/G
CREAM TOPICAL
Status: DISCONTINUED | OUTPATIENT
Start: 2017-01-27 | End: 2017-01-27 | Stop reason: HOSPADM

## 2017-01-27 RX ORDER — CARVEDILOL 12.5 MG/1
12.5 TABLET ORAL 2 TIMES DAILY WITH MEALS
Qty: 180 TABLET | Refills: 3 | Status: SHIPPED | OUTPATIENT
Start: 2017-01-27 | End: 2017-02-13

## 2017-01-27 RX ORDER — POTASSIUM CHLORIDE 7.45 MG/ML
10 INJECTION INTRAVENOUS
Status: DISCONTINUED | OUTPATIENT
Start: 2017-01-27 | End: 2017-01-27 | Stop reason: HOSPADM

## 2017-01-27 RX ORDER — DOPAMINE HYDROCHLORIDE 160 MG/100ML
2-20 INJECTION, SOLUTION INTRAVENOUS CONTINUOUS PRN
Status: DISCONTINUED | OUTPATIENT
Start: 2017-01-27 | End: 2017-01-27 | Stop reason: HOSPADM

## 2017-01-27 RX ORDER — NICARDIPINE HYDROCHLORIDE 2.5 MG/ML
100 INJECTION INTRAVENOUS
Status: DISCONTINUED | OUTPATIENT
Start: 2017-01-27 | End: 2017-01-27 | Stop reason: HOSPADM

## 2017-01-27 RX ORDER — FUROSEMIDE 10 MG/ML
20-100 INJECTION INTRAMUSCULAR; INTRAVENOUS
Status: DISCONTINUED | OUTPATIENT
Start: 2017-01-27 | End: 2017-01-27 | Stop reason: HOSPADM

## 2017-01-27 RX ORDER — DEXTROSE MONOHYDRATE 25 G/50ML
12.5-5 INJECTION, SOLUTION INTRAVENOUS EVERY 30 MIN PRN
Status: DISCONTINUED | OUTPATIENT
Start: 2017-01-27 | End: 2017-01-27 | Stop reason: HOSPADM

## 2017-01-27 RX ORDER — EPTIFIBATIDE 2 MG/ML
180 INJECTION, SOLUTION INTRAVENOUS EVERY 10 MIN PRN
Status: DISCONTINUED | OUTPATIENT
Start: 2017-01-27 | End: 2017-01-27 | Stop reason: HOSPADM

## 2017-01-27 RX ORDER — NITROGLYCERIN 5 MG/ML
100-500 VIAL (ML) INTRAVENOUS
Status: DISCONTINUED | OUTPATIENT
Start: 2017-01-27 | End: 2017-01-27 | Stop reason: HOSPADM

## 2017-01-27 RX ORDER — LORAZEPAM 2 MG/ML
.5-2 INJECTION INTRAMUSCULAR EVERY 4 HOURS PRN
Status: DISCONTINUED | OUTPATIENT
Start: 2017-01-27 | End: 2017-01-27 | Stop reason: HOSPADM

## 2017-01-27 RX ORDER — METHYLPREDNISOLONE SODIUM SUCCINATE 125 MG/2ML
125 INJECTION, POWDER, LYOPHILIZED, FOR SOLUTION INTRAMUSCULAR; INTRAVENOUS
Status: DISCONTINUED | OUTPATIENT
Start: 2017-01-27 | End: 2017-01-27 | Stop reason: HOSPADM

## 2017-01-27 RX ORDER — ADENOSINE 3 MG/ML
12-12000 INJECTION, SOLUTION INTRAVENOUS
Status: DISCONTINUED | OUTPATIENT
Start: 2017-01-27 | End: 2017-01-27 | Stop reason: HOSPADM

## 2017-01-27 RX ORDER — NALOXONE HYDROCHLORIDE 0.4 MG/ML
.1-.4 INJECTION, SOLUTION INTRAMUSCULAR; INTRAVENOUS; SUBCUTANEOUS
Status: DISCONTINUED | OUTPATIENT
Start: 2017-01-27 | End: 2017-01-27

## 2017-01-27 RX ORDER — NIFEDIPINE 10 MG/1
10 CAPSULE ORAL
Status: DISCONTINUED | OUTPATIENT
Start: 2017-01-27 | End: 2017-01-27 | Stop reason: HOSPADM

## 2017-01-27 RX ORDER — LIDOCAINE HYDROCHLORIDE 10 MG/ML
30 INJECTION, SOLUTION EPIDURAL; INFILTRATION; INTRACAUDAL; PERINEURAL
Status: DISCONTINUED | OUTPATIENT
Start: 2017-01-27 | End: 2017-01-27 | Stop reason: HOSPADM

## 2017-01-27 RX ORDER — EPTIFIBATIDE 2 MG/ML
2 INJECTION, SOLUTION INTRAVENOUS CONTINUOUS PRN
Status: DISCONTINUED | OUTPATIENT
Start: 2017-01-27 | End: 2017-01-27 | Stop reason: HOSPADM

## 2017-01-27 RX ORDER — MORPHINE SULFATE 2 MG/ML
1-2 INJECTION, SOLUTION INTRAMUSCULAR; INTRAVENOUS EVERY 5 MIN PRN
Status: DISCONTINUED | OUTPATIENT
Start: 2017-01-27 | End: 2017-01-27 | Stop reason: HOSPADM

## 2017-01-27 RX ORDER — PROMETHAZINE HYDROCHLORIDE 25 MG/ML
6.25-25 INJECTION, SOLUTION INTRAMUSCULAR; INTRAVENOUS EVERY 4 HOURS PRN
Status: DISCONTINUED | OUTPATIENT
Start: 2017-01-27 | End: 2017-01-27 | Stop reason: HOSPADM

## 2017-01-27 RX ORDER — ENALAPRILAT 1.25 MG/ML
1.25-2.5 INJECTION INTRAVENOUS
Status: DISCONTINUED | OUTPATIENT
Start: 2017-01-27 | End: 2017-01-27 | Stop reason: HOSPADM

## 2017-01-27 RX ORDER — PHENYLEPHRINE HCL IN 0.9% NACL 1 MG/10 ML
20-100 SYRINGE (ML) INTRAVENOUS
Status: DISCONTINUED | OUTPATIENT
Start: 2017-01-27 | End: 2017-01-27 | Stop reason: HOSPADM

## 2017-01-27 RX ORDER — NITROGLYCERIN 0.4 MG/1
0.4 TABLET SUBLINGUAL EVERY 5 MIN PRN
Status: DISCONTINUED | OUTPATIENT
Start: 2017-01-27 | End: 2017-01-27 | Stop reason: HOSPADM

## 2017-01-27 RX ORDER — ACETAMINOPHEN 325 MG/1
325-650 TABLET ORAL EVERY 4 HOURS PRN
Status: DISCONTINUED | OUTPATIENT
Start: 2017-01-27 | End: 2017-01-27 | Stop reason: HOSPADM

## 2017-01-27 RX ORDER — ASPIRIN 81 MG/1
81 TABLET ORAL DAILY
Status: DISCONTINUED | OUTPATIENT
Start: 2017-01-28 | End: 2017-01-27 | Stop reason: HOSPADM

## 2017-01-27 RX ORDER — NALOXONE HYDROCHLORIDE 0.4 MG/ML
.2-.4 INJECTION, SOLUTION INTRAMUSCULAR; INTRAVENOUS; SUBCUTANEOUS
Status: DISCONTINUED | OUTPATIENT
Start: 2017-01-27 | End: 2017-01-27

## 2017-01-27 RX ORDER — POTASSIUM CHLORIDE 29.8 MG/ML
20 INJECTION INTRAVENOUS
Status: DISCONTINUED | OUTPATIENT
Start: 2017-01-27 | End: 2017-01-27 | Stop reason: HOSPADM

## 2017-01-27 RX ORDER — LIDOCAINE HYDROCHLORIDE 10 MG/ML
1-10 INJECTION, SOLUTION EPIDURAL; INFILTRATION; INTRACAUDAL; PERINEURAL
Status: COMPLETED | OUTPATIENT
Start: 2017-01-27 | End: 2017-01-27

## 2017-01-27 RX ORDER — IOPAMIDOL 755 MG/ML
100 INJECTION, SOLUTION INTRAVASCULAR ONCE
Status: COMPLETED | OUTPATIENT
Start: 2017-01-27 | End: 2017-01-27

## 2017-01-27 RX ORDER — DOBUTAMINE HYDROCHLORIDE 200 MG/100ML
2-20 INJECTION INTRAVENOUS CONTINUOUS PRN
Status: DISCONTINUED | OUTPATIENT
Start: 2017-01-27 | End: 2017-01-27 | Stop reason: HOSPADM

## 2017-01-27 RX ORDER — HYDRALAZINE HYDROCHLORIDE 20 MG/ML
10-20 INJECTION INTRAMUSCULAR; INTRAVENOUS
Status: DISCONTINUED | OUTPATIENT
Start: 2017-01-27 | End: 2017-01-27 | Stop reason: HOSPADM

## 2017-01-27 RX ORDER — PROTAMINE SULFATE 10 MG/ML
25-100 INJECTION, SOLUTION INTRAVENOUS EVERY 5 MIN PRN
Status: DISCONTINUED | OUTPATIENT
Start: 2017-01-27 | End: 2017-01-27 | Stop reason: HOSPADM

## 2017-01-27 RX ADMIN — FENTANYL CITRATE 50 MCG: 50 INJECTION, SOLUTION INTRAMUSCULAR; INTRAVENOUS at 08:43

## 2017-01-27 RX ADMIN — SODIUM CHLORIDE 300 ML: 9 INJECTION, SOLUTION INTRAVENOUS at 07:41

## 2017-01-27 RX ADMIN — MIDAZOLAM HYDROCHLORIDE 2 MG: 1 INJECTION, SOLUTION INTRAMUSCULAR; INTRAVENOUS at 08:43

## 2017-01-27 RX ADMIN — LISINOPRIL 20 MG: 20 TABLET ORAL at 08:05

## 2017-01-27 RX ADMIN — ATORVASTATIN CALCIUM 80 MG: 40 TABLET, FILM COATED ORAL at 08:05

## 2017-01-27 RX ADMIN — CARVEDILOL 12.5 MG: 12.5 TABLET, FILM COATED ORAL at 08:05

## 2017-01-27 RX ADMIN — TICAGRELOR 90 MG: 90 TABLET ORAL at 08:05

## 2017-01-27 RX ADMIN — IOPAMIDOL 100 ML: 755 INJECTION, SOLUTION INTRAVASCULAR at 09:30

## 2017-01-27 RX ADMIN — ASPIRIN 81 MG: 81 TABLET, COATED ORAL at 08:05

## 2017-01-27 RX ADMIN — NITROGLYCERIN 200 MCG: 5 INJECTION, SOLUTION INTRAVENOUS at 09:04

## 2017-01-27 RX ADMIN — LIDOCAINE HYDROCHLORIDE 8 ML: 10 INJECTION, SOLUTION EPIDURAL; INFILTRATION; INTRACAUDAL; PERINEURAL at 08:45

## 2017-01-27 RX ADMIN — FENTANYL CITRATE 50 MCG: 50 INJECTION, SOLUTION INTRAMUSCULAR; INTRAVENOUS at 09:06

## 2017-01-27 RX ADMIN — BIVALIRUDIN 1.75 MG/KG/HR: 250 INJECTION, POWDER, LYOPHILIZED, FOR SOLUTION INTRAVENOUS at 08:46

## 2017-01-27 RX ADMIN — Medication 87.5 MG: at 08:46

## 2017-01-27 NOTE — PLAN OF CARE
Problem: Goal Outcome Summary  Goal: Goal Outcome Summary  Outcome: No Change  Pt A/O, VSS on RA. Pt up independently in room. Tele: SR. No c/o CP. Pt slept well during the night. Plan for angio today.

## 2017-01-27 NOTE — PLAN OF CARE
Problem: Goal Outcome Summary  Goal: Goal Outcome Summary  Outcome: Improving  Pt had successful PCI. Pt has rt groin angioseal. Site is CDI, but dry drainage is present. Pt also complained of tingling of the rt leg. VSS. Tele shows NSR. Will continue to monitor pt. Plan is to dc this evening after CR.

## 2017-01-27 NOTE — DISCHARGE SUMMARY
[facility]    Discharge Summary  Cardiology    Date of Admission:  1/24/2017  Date of Discharge:  1/27/2017  Discharging Provider:  Dr. Joe  Date of Service (when I saw the patient): 01/27/2017    Discharge Diagnoses   1. Acute anterolateral STEMI, s/p LIZY to large mid LAD, POBA to ostial D4                -  85% mid rPDA- now s/p LIZY with good result.              - jailed diagonal noted, patent LAD stent, remaining 30% circ lesion, LVEDP 17              - trop peak at 130              - on brillanta, asa, statin, acei, bb, nitro    2.  Ischemic cardiomyopathy, EF 40-45%              - started on lisinopril, carvedilol- cr, k+ stable   - no signs or sx of chf this stay    3.  HTN, controlled    4.  Dyslipidemia, goal LDL <70 - lipitor 80 initiated      Patient Active Problem List   Diagnosis     ST elevation (STEMI) myocardial infarction involving left anterior descending coronary artery (H)     Ischemic cardiomyopathy     Dyslipidemia, goal LDL below 70     Benign essential HTN       History of Present Illness  Mark Butterfield is a 56 year old male with past medical history significant for strong family hx of CAD admitted on 1/24/2017 with severe chest pain while moving furniture with his brother and found to have a STEMI.       Hospital Course  Mark Butterfield is a 56 year old male with past medical history significant for strong family hx of CAD admitted on 1/24/2017 with severe chest pain while moving furniture with his brother and found to have a STEMI.  He went emergently to the cath lab and was found to have an occluded mid LAD.  LIZY placed to large mid LAD, POBA to ostial D4 due to jailing.  He did well post procedure.  After discussion, it was elected to complete his revascularization with staged PCI to the 85% stenosed mid rPDA with LIZY placed 1/27.  At that procedure he LAD stent was noted to be patent and diagonal jailed.  There remains a 30% circ lesion.  His EF was found to be  decreased in the 40-45% range, LVEDP 17 on initial angiogram.  He did not have signs or sx of heart failure during this admission.  Blood pressure was elevated on admission and controlled with addition of lisinopril and carvedilol.  Rhythm was stable throughout his stay.  He recovered post pci 1/27 per protocol and walked with cardiac rehab without difficulty before d/c.        Significant Results and Procedures  Imaging: portable cxr 1/24- neg    Echo: 1/25/17    Mild to moderate aortic root dilatation, 4.3 cm.  There is severe mid and distal anterior/septal, and apical wall hypokinesis.  The visual ejection fraction is estimated at 40-45%.  There is mild concentric left ventricular hypertrophy.  The transmitral spectral Doppler flow pattern is suggestive of impaired LV relaxation.  Contrast was used without apparent complications.    Angio 1/24/17  1. Anterior STEMI with 100% thrombotic occlusion of large LAD  2.  Two vessel coronary artery disease (LAD as above and rPDA with 85%  stenosis).   3. Successful Percutaneous coronary intervention of the mid LAD using  a 3.5x32mm Promus Premier LIZY, post dilated to a 4mm diameter.  4. PTCA with POBA of the ostial D4 using a 2.0mm NC balloon  5. Left ventricle with LVEDP of 17 mmHg, no evidence of aortic  stenosis.  6. Sheath was secured in place.    Angio 1/27/17  SUMMARY:   1. Anterior STEMI s/p PCI of LAD, widely patent stents noted today in the LAD with JULIAN 3 flow in a jailed diagonal   2. Planned staged PCI of the rPDA  3. Successful Percutaneous coronary intervention of the mid rPDA using a 3.0x24mm Promus Premier LIZY, post dilated to a 3.3mm size.   4. No evidence of peripheral arterial disease.  5. Arteriotomy closed with a closure device.    PLAN:   1. Aspirin 81 mg po daily lifelong.  2. Ticagrelor 90 mg po bid for at least 1 year uninterrupted.  3. Bedrest per protocol.  4. Return to the primary inpatient team for further evaluation and  management.  5.  Continued medical management and lifestyle modification for  cardiovascular risk factor optimization.     Pending Results  NA     Code Status  Full Code    Primary Care Physician  No primary care provider on file.    Physical Exam                     Filed Vitals:    01/24/17 2015 01/26/17 0500 01/27/17 0500   Weight: 92.625 kg (204 lb 3.2 oz) 117.4 kg (258 lb 13.1 oz) 116.7 kg (257 lb 4.4 oz)     Vital Signs with Ranges        Constitutional   alert and oriented, in no acute distress.  Skin   warm and dry to touch  ENT   no pallor or cyanosis  Neck  no carotid bruit  Lungs ctab, no wheezes, rales or rhonchi  Cardiac rrr, no murmur  Abdomen   abdomen soft, bowel sounds normoactive, no hepatosplenomegaly  Extremities and Back   no clubbing, cyanosis. no edema.  2+ femoral pulse  no bruit, no ecchymosis  Neurological   no gross motor deficits noted, affect appropriate, oriented to time, person and place.    Time Spent on This Encounter  IMaria Victoria, personally saw the patient today and spent greater than 30 minutes discharging this patient.    Discharge Disposition  Discharged to home  Condition at discharge: Good    Consultations This Hospital Stay  CARDIAC REHAB IP CONSULT  NUTRITION SERVICES ADULT IP CONSULT  PHARMACY IP CONSULT  PHARMACY IP CONSULT  CARDIAC REHAB IP CONSULT  NUTRITION SERVICES ADULT IP CONSULT  PHARMACY IP CONSULT  PHARMACY IP CONSULT  SMOKING CESSATION PROGRAM IP CONSULT  SMOKING CESSATION PROGRAM IP CONSULT    Discharge Orders    Basic metabolic panel     ALT   Last Lab Result: No results found for: ALT     Lipid Profile     Cardiac Rehab Referral     Follow-Up with Cardiologist     Follow-Up with Cardiac Advanced Practice Provider     Reason for your hospital stay   You had a heart attack and needed a stent placed in your left anterior descending artery and right posterior descending artery.     Follow-up and recommended labs and tests    Follow up with primary care provider, No primary  care provider on file., within 2 weeks.     Activity   Per nursing handout     Diet   Follow this diet upon discharge: cardiac diet       Discharge Medications  Discharge Medication List as of 1/27/2017  3:27 PM      START taking these medications    Details   aspirin EC 81 MG EC tablet Take 1 tablet (81 mg) by mouth daily, OTC      nitroglycerin (NITROSTAT) 0.4 MG sublingual tablet Place 1 tablet (0.4 mg) under the tongue every 5 minutes as needed for chest pain (may repeat x 2), Disp-25 tablet, R-3, E-Prescribe      atorvastatin (LIPITOR) 80 MG tablet Take 1 tablet (80 mg) by mouth At Bedtime, Disp-90 tablet, R-3, E-Prescribe      lisinopril (PRINIVIL/ZESTRIL) 20 MG tablet Take 1 tablet (20 mg) by mouth daily, Disp-90 tablet, R-3, E-Prescribe      carvedilol (COREG) 12.5 MG tablet Take 1 tablet (12.5 mg) by mouth 2 times daily (with meals), Disp-180 tablet, R-3, E-Prescribe      ticagrelor (BRILINTA) 90 MG tablet Take 1 tablet (90 mg) by mouth every 12 hours To protect your stent(s).  Do not stop taking unless directed by cardiology., Disp-180 tablet, R-3, E-Prescribe         CONTINUE these medications which have NOT CHANGED    Details   hydrocortisone (ANUSOL-HC) 2.5 % cream Place rectally 3 times daily as neededHistorical         STOP taking these medications       simvastatin (ZOCOR) 20 MG tablet Comments:   Reason for Stopping:             Allergies  No Known Allergies  Data  Most Recent 3 CBC's:  Recent Labs   Lab Test  01/25/17   0505  01/24/17 2018   WBC  13.1*  10.8   HGB  15.5  15.6   MCV  88  88   PLT  213  210      Most Recent 3 BMP's:  Recent Labs   Lab Test  01/25/17   0505  01/24/17 2018   NA  137  139   POTASSIUM  4.2  5.1   CHLORIDE  104  107   CO2  23  24   BUN  13  15   CR  0.86  1.12   ANIONGAP  10  8   LUCA  8.5  8.3*   GLC  157*  134*     Most Recent 2 LFT's:No lab results found.  Most Recent INR's and Anticoagulation Dosing History:  Anticoagulation Dose History     Recent Dosing and Labs  Latest Ref Rng 1/24/2017    INR 0.86 - 1.14 1.02        Most Recent 3 Troponin's:  Recent Labs   Lab Test  01/26/17   0608  01/25/17   1258  01/25/17   0505  01/24/17 2023   TROPI  41.939*  94.253*  130.517*   --    TROPONIN   --    --    --   0.00     Most Recent Cholesterol Panel:  Recent Labs   Lab Test  01/25/17   0505   CHOL  178   LDL  121*   HDL  45   TRIG  60     Most Recent 6 Bacteria Isolates From Any Culture (See EPIC Reports for Culture Details):No lab results found.  Most Recent TSH, T4 and A1c Labs:  Recent Labs   Lab Test  01/25/17   0505   A1C  5.8

## 2017-01-27 NOTE — PROGRESS NOTES
COMPLICATIONS:  1. None    SUMMARY:   1. Anterior STEMI s/p PCI of LAD, widely patent stents notedtoday in the LAD with JULIAN 3 flow in a jailed diagonal   2. Planned staged PCI of the rPDA  3. Successful Percutaneous coronary intervention of the mid rPDA using a 3.0x24mm Promus Premier LIZY, post dilated to a 3.3mm size. .  4. No evidence of peripheral arterial disease.  5. Arteriotomy closed with a closure device.    PLAN:   1. Aspirin 81 mg po daily lifelong.  2. Ticagrelor 90 mg po bid for at least 1 year uninterrupted.  3. Bedrest per protocol.  4. Return to the primary inpatient team for further evaluation and management.  5. Continued medical management and lifestyle modification for cardiovascular risk factor optimization.     The attending interventional cardiologist was present for the entire procedure.    Jane Cook MD,MPH [phone]  Interventional Cardiology Fellow

## 2017-01-27 NOTE — PROGRESS NOTES
[facility]  Cardiology Progress Note  Date of Service: 01/27/2017  Primary Cardiologist: Heath    Assessment and Plan   Mark Butterfield is a 56 year old male with past medical history significant for strong family hx of CAD admitted on 1/24/2017 with severe chest pain while moving furniture with his brother and found to have a STEMI.      Assessment:  1. Acute anterolateral STEMI, s/p LIZY to large mid LAD, POBA to ostial D4    -  85% mid rPDA- now s/p LIZY with good result.   - jailed diagonal noted, patent LAD stent, remaining 30% circ lesion, LVEDP 17   - trop peak at 130   - on brillanta, asa, statin, acei, bb, nitro    2.  Ischemic cardiomyopathy, EF 40-45%   - started on lisinopril, carvedilol- cr, k+ stable    3.  HTN, controlled    4.  Dyslipidemia, goal LDL <70   - lipitor 80 initiated    Plan:   1. Continue current medications  2. Bedrest per protocol  3. Intpt/ outpt cardiac rehab  4. No heavy exertion x 6 weeks, off work 1 week.   5. Likely d/c late afternoon if all goes as expected with recovery  6. F/u arranged, meds filled here.    Maria Victoria Shields PA-C  [facility]  Pager: [phone]     Interval History  Feels well, no cp, sob, diaphoresis.  Slept well.  Feels ready to go home.      Physical Exam  Temp: 99  F (37.2  C) Temp src: Oral BP: 116/81 mmHg Pulse: 110 Heart Rate: 83 Resp: 18 SpO2: 93 % O2 Device: None (Room air)    Filed Vitals:    01/24/17 2015 01/26/17 0500 01/27/17 0500   Weight: 92.625 kg (204 lb 3.2 oz) 117.4 kg (258 lb 13.1 oz) 116.7 kg (257 lb 4.4 oz)       Constitutional   alert and oriented, in no acute distress.  Skin   warm and dry to touch  ENT   no pallor or cyanosis  Neck  no carotid bruit  Lungs ctab, no wheezes, rales or rhonchi  Cardiac rrr, no murmur  Abdomen   abdomen soft, bowel sounds normoactive, no hepatosplenomegaly  Extremities and Back   no clubbing, cyanosis. no edema.  2+ femoral pulse  no bruit  Neurological   no gross motor deficits noted, affect  appropriate, oriented to time, person and place.    Medications    NaCl       Percutaneous Coronary Intervention orders placed (this is information for BPA alerting)       - MEDICATION INSTRUCTIONS -       - MEDICATION INSTRUCTIONS -       Continuing ACE inhibitor/ARB from home medication list OR ACE inhibitor/ARB order already placed during this visit       - MEDICATION INSTRUCTIONS -       Percutaneous Coronary Intervention orders placed (this is information for BPA alerting)         sodium chloride (PF)  3 mL Intracatheter Q8H     aspirin EC  81 mg Oral Daily     carvedilol  12.5 mg Oral BID w/meals     lisinopril  20 mg Oral Daily     sodium chloride (PF)  3 mL Intracatheter Q8H     aspirin EC  81 mg Oral Daily     ticagrelor  90 mg Oral Q12H     atorvastatin  80 mg Oral Daily       Data  Most Recent 3 CBC's:  Recent Labs   Lab Test  01/25/17   0505  01/24/17   2018   WBC  13.1*  10.8   HGB  15.5  15.6   MCV  88  88   PLT  213  210     Most Recent 3 BMP's:  Recent Labs   Lab Test  01/25/17   0505  01/24/17 2018   NA  137  139   POTASSIUM  4.2  5.1   CHLORIDE  104  107   CO2  23  24   BUN  13  15   CR  0.86  1.12   ANIONGAP  10  8   LUCA  8.5  8.3*   GLC  157*  134*     Most Recent 3 Troponin's:  Recent Labs   Lab Test  01/26/17   0608  01/25/17   1258  01/25/17   0505  01/24/17   2023   TROPI  41.939*  94.253*  130.517*   --    TROPONIN   --    --    --   0.00     Last 24 hours labs reviewed   EKG: (reviewed personally) ant st elevation    Imaging: portable cxr 1/24- neg    Tele: sinus    Echo: 1/25/17   Mild to moderate aortic root dilatation, 4.3 cm.  There is severe mid and distal anterior/septal, and apical wall hypokinesis.  The visual ejection fraction is estimated at 40-45%.  There is mild concentric left ventricular hypertrophy.  The transmitral spectral Doppler flow pattern is suggestive of impaired LV relaxation.  Contrast was used without apparent complications.    Last ischemic eval: 1/24/17  1.  Anterior STEMI with 100% thrombotic occlusion of large LAD  2.  Two vessel coronary artery disease (LAD as above and rPDA with 85%  stenosis).   3. Successful Percutaneous coronary intervention of the mid LAD using  a 3.5x32mm Promus Premier LIZY, post dilated to a 4mm diameter.  4. PTCA with POBA of the ostial D4 using a 2.0mm NC balloon  5. Left ventricle with LVEDP of 17 mmHg, no evidence of aortic  stenosis.  6. Sheath was secured in place.    Device: none

## 2017-01-27 NOTE — PLAN OF CARE
Problem: Goal Outcome Summary  Goal: Goal Outcome Summary  OT/CR: pt ambulated approx 100 ft x 2 and TDM at gradual increase to 2.3 mph for 25 mins with no reported symptoms and stable CV response. Pt had PCI today. Recommend home with OP CR at [facility] at discharge.

## 2017-01-27 NOTE — PLAN OF CARE
Problem: Goal Outcome Summary  Goal: Goal Outcome Summary  Outcome: No Change  No complaints today up ambulating in room and halls. VSS

## 2017-01-27 NOTE — PROGRESS NOTES
[facility] DISCHARGE NOTE    Patient discharged to home at 4:PM via wheel chair. Accompanied by spouse and staff. Discharge instructions reviewed with patient and spouse  , opportunity offered to ask questions. Prescriptions sent with patient to fill . All belongings sent with patient.Reviewed cares for right groin site, Site looked CDI with some dried drainage to puncture site. Pt off bedrest at 1230 pm and he worked with CR with no symptoms.     Federico Johnson

## 2017-01-27 NOTE — PLAN OF CARE
Problem: Goal Outcome Summary  Goal: Goal Outcome Summary  OT/CR: Pt out of room at procedure at time of scheduled therapy session.

## 2017-01-28 NOTE — PLAN OF CARE
Problem: Goal Outcome Summary  Goal: Goal Outcome Summary  Outcome: Completed Date Met:  01/28/17  Occupational Therapy Discharge Summary    Reason for therapy discharge:    Discharged to home with outpatient therapy.    Progress towards therapy goal(s). See goals on Care Plan in [facility] electronic health record for goal details.  Goals not met.  Barriers to achieving goals:   discharge from facility.    Therapy recommendation(s):    Continued therapy is recommended.  Rationale/Recommendations:  recommend Phase II CR at DC to increase endurance and independence for ADLS and good heart health.

## 2017-01-30 ENCOUNTER — CARE COORDINATION (OUTPATIENT)
Dept: CARDIOLOGY | Facility: CLINIC | Age: 57
End: 2017-01-30

## 2017-01-30 NOTE — PROGRESS NOTES
Called patient and left  to discuss any post hospital d/c questions he may have, review medication changes, and confirm f/u appts.  RN advised patient in  that he has an apt scheduled on 2/3/17 with QUE Maria Victoria Shields Patient advised in  to call clinic with any cardiac related questions or concerns prior to his apt on 2/3/17.

## 2017-01-31 ENCOUNTER — HOSPITAL ENCOUNTER (OUTPATIENT)
Dept: CARDIAC REHAB | Facility: CLINIC | Age: 57
End: 2017-01-31
Attending: PHYSICIAN ASSISTANT
Payer: COMMERCIAL

## 2017-01-31 VITALS — HEIGHT: 74 IN | BODY MASS INDEX: 33.41 KG/M2 | WEIGHT: 260.3 LBS

## 2017-01-31 DIAGNOSIS — I21.3 ST ELEVATION MYOCARDIAL INFARCTION (STEMI), UNSPECIFIED ARTERY (H): Primary | ICD-10-CM

## 2017-01-31 PROBLEM — I25.10 CORONARY ARTERY DISEASE INVOLVING NATIVE CORONARY ARTERY OF NATIVE HEART WITHOUT ANGINA PECTORIS: Status: ACTIVE | Noted: 2017-01-31

## 2017-01-31 LAB — INTERPRETATION ECG - MUSE: NORMAL

## 2017-01-31 PROCEDURE — 93798 PHYS/QHP OP CAR RHAB W/ECG: CPT | Performed by: REHABILITATION PRACTITIONER

## 2017-01-31 PROCEDURE — 40000116 ZZH STATISTIC OP CR VISIT: Performed by: REHABILITATION PRACTITIONER

## 2017-01-31 PROCEDURE — 93797 PHYS/QHP OP CAR RHAB WO ECG: CPT | Performed by: REHABILITATION PRACTITIONER

## 2017-01-31 PROCEDURE — 40000575 ZZH STATISTIC OP CARDIAC VISIT #2: Performed by: REHABILITATION PRACTITIONER

## 2017-01-31 ASSESSMENT — 6 MINUTE WALK TEST (6MWT)
GENDER SELECTION: MALE
PREDICTED: 2044.27
MALE CALC: 2032.9
TOTAL DISTANCE WALKED (FT): 1665
GENDER SELECTION: MALE
TOTAL DISTANCE WALKED (FT): 1665
PREDICTED: 2045.29
MALE CALC: 2031.89
FEMALE CALC: 1519.54
FEMALE CALC: 1519.25

## 2017-01-31 NOTE — PROGRESS NOTES
I have established, reviewed and made necessary changes to the individualized treatment plan and exercise prescription for this patient.    Physician Name (printed): ________________________   Date: _______  Time: ______    Physician Signature: ___________________________________________       01/31/17 1400   Session   Session Initial Evaluation and Exercise Prescription   Certified through this date 03/01/17   Cardiac Rehab Assessment   Cardiac Rehab Assessment Patient was admitted on 1/24/2017 with severe chest pain while moving furniture with his brother and found to have a STEMI.  He went emergently to the cath lab and was found to have an occluded mid LAD.  LIZY placed to large mid LAD, POBA to ostial D4 due to jailing.  He did well post procedure.  After discussion, it was elected to complete his revascularization with staged PCI to the 85% stenosed mid rPDA with LIZY placed 1/27.  At that procedure he LAD stent was noted to be patent and diagonal jailed.  Patient reports that he has been feeling great since his stents were placed, denies any anginal symptoms.  He has already made significant changes to his diet and has started walking with his [place] at the mall.      The patient's history and clinical status including hemodynamics and ECG were evaluated.  The patient was assessed to be stable and appropriate to begin exercise.   The patient's functional capacity and exercise prescription were determined by the completion of the 6 minute walk test.  See results below.  The patient was oriented to the program.  Risk factor profile was completed. Goals and objectives were discussed. CV response was WNL. No symptoms, complaints or pain were reported. Good prognosis for reaching above goals. Skilled therapy is necessary in order to monitor CV response to progressive exercise to 6-7 METs inorder to reach patients goals. Plan to progress to 30-40 minutes of exercise prior to discharge from cardiac rehab.  Initial THR  "of 20-30 beats above RHR; Effort rating of 4-6.  Initiate muscle conditioning as appropriate.  Provide risk factor education and behavior change counseling.      General Information   Treatment Diagnosis STEMI   Date of Treatment Diagnosis 01/24/17   Secondary Treatment Diagnosis Stent   Significant Past CV History None   Lead up symptoms chest pressure, SOB, diaphoresis,    Hospital Location [facility]   Hospital Discharge Date 01/27/17   Signs and Symptoms Post Hospital Discharge None   Outpatient Cardiac Rehab Start Date 01/31/17   Primary Physician Dr. Chaz Contreras  ([phone])   Cardiologist Dr. Joe   Cardiologist Follow Up Scheduled   Ejection Fraction 40-45%   Risk Stratification Moderate   Living and Work Status    Living Arrangements and Social Status house   Support System Live with an adult   Return to Employment Yes   Preventative Medications   CMS recommended medications Ace inhibitors;Antiplatelets;Beta Blocker;Lipid Lowering   Falls Screen   Have you fallen two or more times in the past year? No   Have you fallen and had an injury in the past year? No   Pain   Patient Currently in Pain No   Physical Assessments   Incisions WNL   Edema None   Right Lung Sounds not assessed   Left Lung Sounds not assessed   Limitations No limitations   Individualized Treatment Plan   Monitored Sessions Scheduled 24   Monitored Sessions Attended 1   Oxygen   Supplemental Oxygen needed No   Nutrition Management - Weight Management   Assessment Initial Assessment   Age 56   Weight 118.071 kg (260 lb 4.8 oz)   Height 1.88 m (6' 2\")   BMI (Calculated) 33.49   Initial Rate Your Plate Score. Dietary tool to assess eating patterns. Scores range from 24 to 72. The higher the score the healthier the eating pattern. 33   Nutrition Management - Lipids   Lipids Labs Available   Date 01/25/17   Total Cholesterol 178   Triglycerides 60   HDL 45      Prescribed Lipid Medication Yes   Statin Intensity Intensity Not Indicated "   Nutrition Management - Diabetes   Diabetes No   Nutrition Management Summary   Dietary Recommendations Mediterranean   Stages of Change for Diet Compliance Action   Psychosocial Management   Psychosocial Assessment Initial   Is there history of clinical depression or increased risk of depression? No previous history   Current Level of Stress per Patient Report Mild   Initial Patient Health Questionnaire -9 Score (PHQ-9) for depression. 5-9 Minimal symptoms, 10-14 Minor depression, 15-19 Major depression, moderately severe, > 20 Major depression, severe  6   Initial [facility] Survey score.  Quality of Life:   If total score > 25 review individual areas where patient rated a 4 or 5.  Consider patients current medical condition and what role that plays on the score.   Adjust treatment protocol to improve areas of concern.  Consider the following:  PHQ9 score, DASI, and re-assessment within the next 30 days to assist with developing treatments.  18   Stages of Change Maintenance   Other Core Components - Hypertension   History of or Diagnosis of Hypertension No   Currently taking Anti-Hypertensives Yes;Beta blocker;Ace Inhibitor   Other Core Components - Tobacco   History of Tobacco Use Never   Activity/Exercise History   Activity/Exercise Assessment Initial   Activity/Exercise Status prior to event? Was Physically Active   Number of Days Currently participating in Moderate Physical Activity? 2   Number of Days Currently performing  Aerobic Exercise (including rehab)? 0   Number of Minutes per Session Currently of Aerobic Exercise (average)? 0   Current Stage of Change (Physical Activity) Preparation   Current Stage of Change (Aerobic Exercise) Preparation   Patient Goals Goal #1   Goal #1 Description Increase exercise intensity to 6-7 METs inorder to return to activities such as chopping wood and portaging a canoe.   Goal #1 Target Date 04/28/17   Exercise Assessment   6 Minute Walk Predicted - Gender Selection  Male   6 Minute Walk Predicted (Male) 2031.89   6 Minute Walk Predicted (Female) 1519.25   Initial 6 Minute Walk Distance (Feet) 1665 ft   Resting HR 60 bpm   Exercise HR 98 bpm   Post Exercise HR 64 bpm   Resting BP 92/60 mmHg   Exercise /68 mmHg   Post Exercise /60 mmHg   Effort Rating 4   Current MET Level 3.4   MET Level Goal 6-7   ECG Rhythm Sinus rhythm   Ectopy None   Current Symptoms Denies symptoms   Limitations/Restrictions None   Exercise Prescription   Mode Treadmill;Upright bike;Weights   Duration/Time 30-45 min   Frequency 3 daysweek   THR (85% of age predicted max HR) 138.55   OMNI Effort Rating (0-10 Scale) 4-6/10   Progression Continuous bouts;Total exercise time of 30-45 minutes;Aerobic exercise to OMNI rating of 6 or below and at or below THR;Progress peak intensity by 1/2 MET per week   Recommended Home Exercise   Type of Exercise Walking   Frequency (days per week) 2-3   Duration (minutes per session) 30-45 min   Effort Rating Recommended 4-6/10   Current Home Exercise   Type of Exercise Walking   Frequency (days per week) 2   Duration (minutes per session) 45   Learning Assessment   Learner Patient   Primary Language English   Preferred Learning Style Listening;Demonstration   Barriers to Learning No barriers noted   Patient Education   Education recommended Nutrition;Exercise Principles

## 2017-01-31 NOTE — PROGRESS NOTES
Physician cosignature/electronic signature indicates approval of this ITP document. I have established, reviewed and made necessary changes to the individualized treatment plan and exercise prescription for this patient.       01/31/17 1500   Session   Session 30 Day Individualized Treatment Plan   Certified through this date 03/04/17   Cardiac Rehab Assessment   Cardiac Rehab Assessment Patient was admitted on 1/24/2017 with severe chest pain while moving furniture with his brother and found to have a STEMI.  He went emergently to the cath lab and was found to have an occluded mid LAD.  LIZY placed to large mid LAD, POBA to ostial D4 due to jailing.  He did well post procedure.  After discussion, it was elected to complete his revascularization with staged PCI to the 85% stenosed mid rPDA with LIZY placed 1/27.  At that procedure he LAD stent was noted to be patent and diagonal jailed.  Patient reports that he has been feeling great since his stents were placed, denies any anginal symptoms.  He has already made significant changes to his diet and has started walking with his wife at the mall.   1/31 Patient has attended only the EVAL sessions when MD present to sign ITPs.    General Information   Treatment Diagnosis STEMI   Date of Treatment Diagnosis 01/24/17   Secondary Treatment Diagnosis Stent   Significant Past CV History None   Lead up symptoms chest pressure, SOB, diaphoresis,    Hospital Location [facility]   Hospital Discharge Date 01/27/17   Signs and Symptoms Post Hospital Discharge None   Outpatient Cardiac Rehab Start Date 01/31/17   Primary Physician Dr. Chaz Contreras  ([phone])   Cardiologist Dr. Joe   Cardiologist Follow Up Scheduled   Ejection Fraction 40-45%   Risk Stratification Moderate   Living and Work Status    Living Arrangements and Social Status house   Support System Live with an adult   Return to Employment Yes   Preventative Medications   CMS recommended medications Ace  "inhibitors;Antiplatelets;Beta Blocker;Lipid Lowering   Falls Screen   Have you fallen two or more times in the past year? No   Have you fallen and had an injury in the past year? No   Pain   Patient Currently in Pain No   Physical Assessments   Incisions WNL   Edema None   Right Lung Sounds not assessed   Left Lung Sounds not assessed   Limitations No limitations   Individualized Treatment Plan   Monitored Sessions Scheduled 24   Monitored Sessions Attended 1   Oxygen   Supplemental Oxygen needed No   Nutrition Management - Weight Management   Assessment Initial Assessment   Age 56   Weight 118.071 kg (260 lb 4.8 oz)   Height 1.88 m (6' 2.02\")   BMI (Calculated) 33.48   Initial Rate Your Plate Score. Dietary tool to assess eating patterns. Scores range from 24 to 72. The higher the score the healthier the eating pattern. 33   Nutrition Management - Lipids   Lipids Labs Available   Date 01/25/17   Total Cholesterol 178   Triglycerides 60   HDL 45      Prescribed Lipid Medication Yes   Statin Intensity Intensity Not Indicated   Nutrition Management - Diabetes   Diabetes No   Nutrition Management Summary   Dietary Recommendations Mediterranean   Stages of Change for Diet Compliance Action   Psychosocial Management   Psychosocial Assessment Initial   Is there history of clinical depression or increased risk of depression? No previous history   Current Level of Stress per Patient Report Mild   Initial Patient Health Questionnaire -9 Score (PHQ-9) for depression. 5-9 Minimal symptoms, 10-14 Minor depression, 15-19 Major depression, moderately severe, > 20 Major depression, severe  6   Initial [facility] Survey score.  Quality of Life:   If total score > 25 review individual areas where patient rated a 4 or 5.  Consider patients current medical condition and what role that plays on the score.   Adjust treatment protocol to improve areas of concern.  Consider the following:  PHQ9 score, DASI, and re-assessment " within the next 30 days to assist with developing treatments.  18   Stages of Change Maintenance   Other Core Components - Hypertension   History of or Diagnosis of Hypertension No   Currently taking Anti-Hypertensives Yes;Beta blocker;Ace Inhibitor   Other Core Components - Tobacco   History of Tobacco Use Never   Activity/Exercise History   Activity/Exercise Assessment Initial   Activity/Exercise Status prior to event? Was Physically Active   Number of Days Currently participating in Moderate Physical Activity? 2   Number of Days Currently performing  Aerobic Exercise (including rehab)? 0   Number of Minutes per Session Currently of Aerobic Exercise (average)? 0   Current Stage of Change (Physical Activity) Preparation   Current Stage of Change (Aerobic Exercise) Preparation   Patient Goals Goal #1   Goal #1 Description Increase exercise intensity to 6-7 METs inorder to return to activities such as chopping wood and portaging a canoe.   Goal #1 Target Date 04/28/17   Exercise Assessment   6 Minute Walk Predicted - Gender Selection Male   6 Minute Walk Predicted (Male) 2032.9   6 Minute Walk Predicted (Female) 1519.54   Initial 6 Minute Walk Distance (Feet) 1665 ft   Resting HR 60 bpm   Exercise HR 98 bpm   Post Exercise HR 64 bpm   Resting BP 92/60 mmHg   Exercise /68 mmHg   Post Exercise /60 mmHg   Effort Rating 4   Current MET Level 3.4   MET Level Goal 6-7   ECG Rhythm Sinus rhythm   Ectopy None   Current Symptoms Denies symptoms   Limitations/Restrictions None   Exercise Prescription   Mode Treadmill;Upright bike;Weights   Duration/Time 30-45 min   Frequency 3 daysweek   THR (85% of age predicted max HR) 138.55   OMNI Effort Rating (0-10 Scale) 4-6/10   Progression Continuous bouts;Total exercise time of 30-45 minutes;Aerobic exercise to OMNI rating of 6 or below and at or below THR;Progress peak intensity by 1/2 MET per week   Recommended Home Exercise   Type of Exercise Walking   Frequency (days  per week) 2-3   Duration (minutes per session) 30-45 min   Effort Rating Recommended 4-6/10   Current Home Exercise   Type of Exercise Walking   Frequency (days per week) 2   Duration (minutes per session) 45   Learning Assessment   Learner Patient   Primary Language English   Preferred Learning Style Listening;Demonstration   Barriers to Learning No barriers noted   Patient Education   Education recommended Nutrition;Exercise Principles

## 2017-02-01 ENCOUNTER — HOSPITAL ENCOUNTER (OUTPATIENT)
Dept: CARDIAC REHAB | Facility: CLINIC | Age: 57
End: 2017-02-01
Attending: PHYSICIAN ASSISTANT
Payer: COMMERCIAL

## 2017-02-01 PROCEDURE — 40000116 ZZH STATISTIC OP CR VISIT

## 2017-02-01 PROCEDURE — 93798 PHYS/QHP OP CAR RHAB W/ECG: CPT

## 2017-02-02 ENCOUNTER — HOSPITAL ENCOUNTER (OUTPATIENT)
Dept: CARDIAC REHAB | Facility: CLINIC | Age: 57
End: 2017-02-02
Attending: PHYSICIAN ASSISTANT
Payer: COMMERCIAL

## 2017-02-02 PROCEDURE — 93798 PHYS/QHP OP CAR RHAB W/ECG: CPT | Performed by: OCCUPATIONAL THERAPIST

## 2017-02-02 PROCEDURE — 40000116 ZZH STATISTIC OP CR VISIT: Performed by: OCCUPATIONAL THERAPIST

## 2017-02-03 ENCOUNTER — OFFICE VISIT (OUTPATIENT)
Dept: CARDIOLOGY | Facility: CLINIC | Age: 57
End: 2017-02-03
Attending: PHYSICIAN ASSISTANT
Payer: COMMERCIAL

## 2017-02-03 ENCOUNTER — HOSPITAL ENCOUNTER (OUTPATIENT)
Dept: CARDIAC REHAB | Facility: CLINIC | Age: 57
End: 2017-02-03
Attending: PHYSICIAN ASSISTANT
Payer: COMMERCIAL

## 2017-02-03 VITALS
RESPIRATION RATE: 16 BRPM | HEART RATE: 59 BPM | BODY MASS INDEX: 32.92 KG/M2 | HEIGHT: 74 IN | WEIGHT: 256.5 LBS | SYSTOLIC BLOOD PRESSURE: 82 MMHG | OXYGEN SATURATION: 97 % | DIASTOLIC BLOOD PRESSURE: 60 MMHG

## 2017-02-03 DIAGNOSIS — I21.3 ST ELEVATION MYOCARDIAL INFARCTION (STEMI), UNSPECIFIED ARTERY (H): ICD-10-CM

## 2017-02-03 DIAGNOSIS — I25.5 ISCHEMIC CARDIOMYOPATHY: ICD-10-CM

## 2017-02-03 DIAGNOSIS — E78.5 DYSLIPIDEMIA, GOAL LDL BELOW 70: ICD-10-CM

## 2017-02-03 DIAGNOSIS — I25.10 CORONARY ARTERY DISEASE INVOLVING NATIVE CORONARY ARTERY OF NATIVE HEART WITHOUT ANGINA PECTORIS: ICD-10-CM

## 2017-02-03 DIAGNOSIS — I21.02 ST ELEVATION (STEMI) MYOCARDIAL INFARCTION INVOLVING LEFT ANTERIOR DESCENDING CORONARY ARTERY (H): Primary | ICD-10-CM

## 2017-02-03 DIAGNOSIS — I10 BENIGN ESSENTIAL HTN: ICD-10-CM

## 2017-02-03 LAB
ALT SERPL W P-5'-P-CCNC: <5 U/L (ref 5–30)
ANION GAP SERPL CALCULATED.3IONS-SCNC: 14.6 MMOL/L (ref 6–17)
BUN SERPL-MCNC: 22 MG/DL (ref 7–30)
CALCIUM SERPL-MCNC: 9.5 MG/DL (ref 8.5–10.5)
CHLORIDE SERPL-SCNC: 103 MMOL/L (ref 98–107)
CHOLEST SERPL-MCNC: 143 MG/DL
CO2 SERPL-SCNC: 24 MMOL/L (ref 23–29)
CREAT SERPL-MCNC: 1.44 MG/DL (ref 0.7–1.3)
GFR SERPL CREATININE-BSD FRML MDRD: 51 ML/MIN/1.7M2
GLUCOSE SERPL-MCNC: 97 MG/DL (ref 70–105)
HDLC SERPL-MCNC: 31 MG/DL
LDLC SERPL CALC-MCNC: 93 MG/DL
NONHDLC SERPL-MCNC: 112 MG/DL
POTASSIUM SERPL-SCNC: 4.6 MMOL/L (ref 3.5–5.1)
SODIUM SERPL-SCNC: 137 MMOL/L (ref 136–145)
TRIGL SERPL-MCNC: 96 MG/DL

## 2017-02-03 PROCEDURE — 36415 COLL VENOUS BLD VENIPUNCTURE: CPT | Performed by: PHYSICIAN ASSISTANT

## 2017-02-03 PROCEDURE — 40000116 ZZH STATISTIC OP CR VISIT

## 2017-02-03 PROCEDURE — 99214 OFFICE O/P EST MOD 30 MIN: CPT | Performed by: PHYSICIAN ASSISTANT

## 2017-02-03 PROCEDURE — 80061 LIPID PANEL: CPT | Performed by: PHYSICIAN ASSISTANT

## 2017-02-03 PROCEDURE — 80048 BASIC METABOLIC PNL TOTAL CA: CPT | Performed by: PHYSICIAN ASSISTANT

## 2017-02-03 PROCEDURE — 84460 ALANINE AMINO (ALT) (SGPT): CPT | Performed by: PHYSICIAN ASSISTANT

## 2017-02-03 PROCEDURE — 93798 PHYS/QHP OP CAR RHAB W/ECG: CPT

## 2017-02-03 RX ORDER — LISINOPRIL 10 MG/1
10 TABLET ORAL DAILY
Qty: 90 TABLET | Refills: 3 | Status: SHIPPED | OUTPATIENT
Start: 2017-02-03 | End: 2017-02-17

## 2017-02-03 NOTE — PATIENT INSTRUCTIONS
Thanks for coming into [facility] today.    We discussed: overall things look good.    Blood pressure is low, this will improve with medications.   You are ok to go back to work and slowly increase lifting.    Check your blood pressure everyday- goal is /50-85, please call if it's outside this range.    Stay below 2,000 mg of salt.      Medication changes:  Decrease lisinopril to 10 mg once a day.      Results: Kidney number are a bit high, but this will improve with decreased lisinopril dose.   Recheck labs in 2 weeks.    Component      Latest Ref Rng 2/3/2017   Sodium      136 - 145 mmol/L 137   Potassium      3.5 - 5.1 mmol/L 4.6   Chloride      98 - 107 mmol/L 103   Carbon Dioxide      23 - 29 mmol/L 24   Anion Gap      6 - 17 mmol/L 14.6   Glucose      70 - 105 mg/dL 97   Urea Nitrogen      7 - 30 mg/dL 22   Creatinine      0.70 - 1.30 mg/dL 1.44 (H)   GFR Estimate      >60 mL/min/1.7m2 51 (L)   GFR Estimate If Black      >60 mL/min/1.7m2 61   Calcium      8.5 - 10.5 mg/dL 9.5   Cholesterol      <200 mg/dL 143   Triglycerides      <150 mg/dL 96   HDL Cholesterol      >39 mg/dL 31 (L)   LDL Cholesterol Calculated      <100 mg/dL 93   Non HDL Cholesterol      <130 mg/dL 112   ALT      5 - 30 U/L <5 (L)     Follow up: with your family doctor in about 2 weeks to recheck blood pressure.    Please call my nurse at [phone] with any questions or concerns.    Scheduling phone number: [phone]  Reminder: Please bring in all current medications, over the counter supplements and vitamin bottles to your next appointment.

## 2017-02-03 NOTE — MR AVS SNAPSHOT
After Visit Summary   2/3/2017    Mark Butterfield    MRN: [id number]           Patient Information     Date Of Birth          1960        Visit Information        Provider Department      2/3/2017 3:10 PM More, Maria Victoria Avila PA-C [facility] [place]        Today's Diagnoses     ST elevation (STEMI) myocardial infarction involving left anterior descending coronary artery (H)    -  1     Ischemic cardiomyopathy         Coronary artery disease involving native coronary artery of native heart without angina pectoris         Benign essential HTN         Dyslipidemia, goal LDL below 70         ST elevation myocardial infarction (STEMI), unspecified artery (H)           Care Instructions    Thanks for coming into [facility] clinic today.    We discussed: overall things look good.    Blood pressure is low, this will improve with medications.   You are ok to go back to work and slowly increase lifting.    Check your blood pressure everyday- goal is /50-85, please call if it's outside this range.    Stay below 2,000 mg of salt.      Medication changes:  Decrease lisinopril to 10 mg once a day.      Results: Kidney number are a bit high, but this will improve with decreased lisinopril dose.   Recheck labs in 2 weeks.    Component      Latest Ref Rng 2/3/2017   Sodium      136 - 145 mmol/L 137   Potassium      3.5 - 5.1 mmol/L 4.6   Chloride      98 - 107 mmol/L 103   Carbon Dioxide      23 - 29 mmol/L 24   Anion Gap      6 - 17 mmol/L 14.6   Glucose      70 - 105 mg/dL 97   Urea Nitrogen      7 - 30 mg/dL 22   Creatinine      0.70 - 1.30 mg/dL 1.44 (H)   GFR Estimate      >60 mL/min/1.7m2 51 (L)   GFR Estimate If Black      >60 mL/min/1.7m2 61   Calcium      8.5 - 10.5 mg/dL 9.5   Cholesterol      <200 mg/dL 143   Triglycerides      <150 mg/dL 96   HDL Cholesterol      >39 mg/dL 31 (L)   LDL Cholesterol Calculated      <100 mg/dL 93   Non HDL  Cholesterol      <130 mg/dL 112   ALT      5 - 30 U/L <5 (L)     Follow up: with your family doctor in about 2 weeks to recheck blood pressure.    Please call my nurse at [phone] with any questions or concerns.    Scheduling phone number: [phone]  Reminder: Please bring in all current medications, over the counter supplements and vitamin bottles to your next appointment.          Follow-ups after your visit        Your next 10 appointments already scheduled     Feb 07, 2017  4:30 PM   Treatment 60 with Sh Cardiac Rehab 3   [facility])    [address]  [facility] [address]   [phone]            Feb 09, 2017  4:30 PM   Treatment 60 with Sh Cardiac Rehab 3   [facility])    [address]  [facility] [address]   [phone]            Feb 10, 2017  3:00 PM   Treatment 60 with Sh Cardiac Rehab 1   [facility])    [address]  [facility] [address]   [phone]            Feb 13, 2017  3:00 PM   Treatment 60 with Sh Cardiac Rehab 1   [facility] ([facility])    [address]  [facility] [address]   [phone]            Feb 14, 2017  4:30 PM   Treatment 60 with Sh Cardiac Rehab 3   [facility] ([facility])    [address]  [place] [address]   [phone]            Feb 16, 2017  4:00 PM   Consult HOD with Sh Cardiac Rehab 3   [facility] ([facility])    [address]  [facility] [address]   [phone]            Feb 16, 2017  4:30 PM   Treatment 60 with Sh Cardiac Rehab 3   [facility] ([facility])    [address]  [facility] [address]   [phone]            Feb 20, 2017  4:30 PM  "  Treatment 60 with Sh Cardiac Rehab 3   [facility] ([facility])    [address]  [place] [address]   [phone]            Feb 21, 2017  4:30 PM   Treatment 60 with Sh Cardiac Rehab 3   [facility] ([facility])    [address]  [place] [address]   [phone]            Feb 23, 2017  4:30 PM   Treatment 60 with Sh Cardiac Rehab 3   [facility] ([facility])    [address]  [place] [address]   [phone]              Future tests that were ordered for you today     Open Future Orders        Priority Expected Expires Ordered    Basic metabolic panel Routine 2/17/2017 2/3/2018 2/3/2017            Who to contact     If you have questions or need follow up information about today's clinic visit or your schedule please contact [facility] [place] directly at [phone].  Normal or non-critical lab and imaging results will be communicated to you by Vookhart, letter or phone within 4 business days after the clinic has received the results. If you do not hear from us within 7 days, please contact the clinic through Vookhart or phone. If you have a critical or abnormal lab result, we will notify you by phone as soon as possible.  Submit refill requests through Dotstudioz or call your pharmacy and they will forward the refill request to us. Please allow 3 business days for your refill to be completed.          Additional Information About Your Visit        Vookhart Information     Dotstudioz lets you send messages to your doctor, view your test results, renew your prescriptions, schedule appointments and more. To sign up, go to [email or web address] . Click on \"Log in\" on the left side of the screen, which will take you to the Welcome page. Then click on \"Sign up Now\" on the right side of the page.     You will be asked " "to enter the access code listed below, as well as some personal information. Please follow the directions to create your username and password.     Your access code is: [email or web address]  Expires: 2017  1:23 PM     Your access code will  in 90 days. If you need help or a new code, please call your [facility] clinic or [phone].        Care EveryWhere ID     This is your Care EveryWhere ID. This could be used by other organizations to access your [facility] medical records  [id number]        Your Vitals Were     Pulse Respirations Height BMI (Body Mass Index) Pulse Oximetry       59 16 1.88 m (6' 2\") 32.92 kg/m2 97%        Blood Pressure from Last 3 Encounters:   17 82/60   17 148/85    Weight from Last 3 Encounters:   17 116.348 kg (256 lb 8 oz)   17 118.071 kg (260 lb 4.8 oz)   17 116.7 kg (257 lb 4.4 oz)                 Today's Medication Changes          These changes are accurate as of: 2/3/17  3:52 PM.  If you have any questions, ask your nurse or doctor.               These medicines have changed or have updated prescriptions.        Dose/Directions    lisinopril 10 MG tablet   Commonly known as:  PRINIVIL/ZESTRIL   This may have changed:    - medication strength  - how much to take   Used for:  Ischemic cardiomyopathy, ST elevation myocardial infarction (STEMI), unspecified artery (H), Benign essential HTN   Changed by:  Maria Victoria Shields PA-C        Dose:  10 mg   Take 1 tablet (10 mg) by mouth daily   Quantity:  90 tablet   Refills:  3            Where to get your medicines      These medications were sent to [facility] [address] [facility] [place] [address]  [address] [address], [facility] [address]     Phone:  [phone]    - lisinopril 10 MG tablet             Primary Care Provider Office Phone # Fax #    Chaz MADISON Contreras [phone]       [facility] [address] [address]        Thank you!     " Thank you for choosing [facility] [place]  for your care. Our goal is always to provide you with excellent care. Hearing back from our patients is one way we can continue to improve our services. Please take a few minutes to complete the written survey that you may receive in the mail after your visit with us. Thank you!             Your Updated Medication List - Protect others around you: Learn how to safely use, store and throw away your medicines at www.disposemymeds.org.          This list is accurate as of: 2/3/17  3:52 PM.  Always use your most recent med list.                   Brand Name Dispense Instructions for use    aspirin 81 MG EC tablet      Take 1 tablet (81 mg) by mouth daily       atorvastatin 80 MG tablet    LIPITOR    90 tablet    Take 1 tablet (80 mg) by mouth At Bedtime       carvedilol 12.5 MG tablet    COREG    180 tablet    Take 1 tablet (12.5 mg) by mouth 2 times daily (with meals)       hydrocortisone 2.5 % cream    ANUSOL-HC     Place rectally 3 times daily as needed       lisinopril 10 MG tablet    PRINIVIL/ZESTRIL    90 tablet    Take 1 tablet (10 mg) by mouth daily       nitroglycerin 0.4 MG sublingual tablet    NITROSTAT    25 tablet    Place 1 tablet (0.4 mg) under the tongue every 5 minutes as needed for chest pain (may repeat x 2)       ticagrelor 90 MG tablet    BRILINTA    180 tablet    Take 1 tablet (90 mg) by mouth every 12 hours To protect your stent(s).  Do not stop taking unless directed by cardiology.

## 2017-02-03 NOTE — Clinical Note
2/3/2017    FACUNDO SOLO  [facility]   [address]  [place] [address]    RE:  Richard Butterfield       Dear Colleague,    I had the pleasure of seeing Mark Huieavngelista in the [facility].    PRIMARY CARDIOLOGIST:  Dr. Joe.      REASON FOR VISIT:  STEMI followup.      HISTORY OF PRESENT ILLNESS:  Mr. Butterfield is a delightful 56-year-old gentleman who had past medical history significant only for strong family history of coronary artery disease and borderline hypertension when he developed severe chest pain while moving furniture.  He presented to the ER and was found to have an anterior STEMI and occluded mid-LAD.  He had a drug-eluting stent placed to the large mid-LAD and a POBA to ostial D4 due to jailing.  He did well post-procedure and he had remaining 85% stenosed right PDA that was stented in a staged procedure on 01/27.  At that time, his LAD stent was found to be completely patent and his diagonal was jailed.  He has a remaining 30% circ lesion.  His EF was found to be decreased in the 40%-45% range but was without signs or symptoms of heart failure during that admission.  Blood pressure was elevated, and the carvedilol and lisinopril were added and up-titrated during his stay, but he had no complications or rhythm abnormalities during his stay.      He comes in today for followup of these events.  He has overall been feeling well.  He does get lightheaded from time to time, but he has not had syncope or near-syncope, no additional chest pain, shortness of breath, orthopnea or PND.  No leg swelling.  He has been taking his medications as directed.  He has been bored this week, as he is not back to work.  He has been doing cardiac rehab without difficulty.      SOCIAL HISTORY:  He is .  He has several children, a son and fiancee and a daughter.  [place] and he previously waterskied and wakeboarded.  He has not  done that in some time.  He is a lifelong nonsmoker, no alcohol use, no drug use.      PHYSICAL EXAMINATION:   GENERAL:  Well-developed, well-nourished, fit-appearing gentleman in no acute distress.   HEENT:  Normocephalic, atraumatic.   HEART:  Regular in rate and rhythm, no murmur, rub or gallop.  Carotids are without bruit.   LUNGS:  Clear with the exception of the right lung being slightly diminished.  There are no wheezes, rales or rhonchi.   EXTREMITIES:  Without peripheral edema.  His right femoral pulse is 2+ without bruit.  He does have an area of ecchymosis around his vascular access site.  There does not appear to be a hematoma under the skin.  There is a pea-sized nodule under the skin consistent with a vascular plug.      ASSESSMENT AND PLAN:   1.  Recent STEMI with a drug-eluting stent placed to the mid-LAD and RPDA in a staged procedure.  He has just remaining 30% circumflex lesion.  His troponins peaked at 130s.  This was a fairly large MI, and he was appropriately started on aspirin, statin, beta blocker, ACE and Brilinta as well as nitro.  It does appear that we have overshot his blood pressure control.  It was quite high in the hospital, but he comes in today and is fairly hypotensive with blood pressure 82/60 on initial check at 98/70 on the other arm.  Fortunately, he has not been syncopal or near-syncopal and he has no anginal symptoms.   2.  Hypertension, now hypotensive.  Will decrease lisinopril to 10 mg a day from 20 mg a day.  In addition, his creatinine is elevated today to 1.44, likely secondary to the lisinopril, but in part probably due to hypotension.  I do expect this to improve.  Will repeat labs on this in 1-2 weeks.   3.  Dyslipidemia with LDL on admission at 121.  This was repeated a bit early today, but it has already dropped to 93 with an HDL of 31.  He has improved his diet and is exercising.      I am going to have him check his home blood pressure to see if we can at least  get him in the 90s consistently or even over 100.  While high-dose ACE and beta blocker is beneficial for his cardiomyopathy, certainly we do not want him hypotensive or cause him to be syncopal or near syncopal.  I am hopeful, as he presented early, that we will see some recovery in his EF.  He is to see his primary care provider in about 2 weeks.  He will then also get labs at that time.  He will continue cardiac rehab and then follow up with Dr. Joe with an echocardiogram, BMP, ALT and lipid panel in about 2-1/2 months.  I would be happy to see him at any time after that or in between with concerns.  Thank you for allowing me to participate in his care.      Maria Victoria Shields PA-C

## 2017-02-03 NOTE — PROGRESS NOTES
Quick Note:    Reviewed during clinic visit. Please see progress note for plan. Maria Victoria Shields PA-C 2/3/2017 3:59 PM        ______

## 2017-02-03 NOTE — PROGRESS NOTES
[id number]  HPI and Plan:   See dictation    Orders this Visit:  Orders Placed This Encounter   Procedures     Basic metabolic panel     Echocardiogram     Orders Placed This Encounter   Medications     lisinopril (PRINIVIL/ZESTRIL) 10 MG tablet     Sig: Take 1 tablet (10 mg) by mouth daily     Dispense:  90 tablet     Refill:  3     DO NOT FILL, dose adjustment only.     Medications Discontinued During This Encounter   Medication Reason     lisinopril (PRINIVIL/ZESTRIL) 20 MG tablet Reorder         Encounter Diagnoses   Name Primary?     ST elevation (STEMI) myocardial infarction involving left anterior descending coronary artery (H) Yes     Ischemic cardiomyopathy      Coronary artery disease involving native coronary artery of native heart without angina pectoris      Benign essential HTN      Dyslipidemia, goal LDL below 70      ST elevation myocardial infarction (STEMI), unspecified artery (H)        CURRENT MEDICATIONS:  Current Outpatient Prescriptions   Medication Sig Dispense Refill     lisinopril (PRINIVIL/ZESTRIL) 10 MG tablet Take 1 tablet (10 mg) by mouth daily 90 tablet 3     aspirin EC 81 MG EC tablet Take 1 tablet (81 mg) by mouth daily       nitroglycerin (NITROSTAT) 0.4 MG sublingual tablet Place 1 tablet (0.4 mg) under the tongue every 5 minutes as needed for chest pain (may repeat x 2) 25 tablet 3     atorvastatin (LIPITOR) 80 MG tablet Take 1 tablet (80 mg) by mouth At Bedtime 90 tablet 3     carvedilol (COREG) 12.5 MG tablet Take 1 tablet (12.5 mg) by mouth 2 times daily (with meals) 180 tablet 3     ticagrelor (BRILINTA) 90 MG tablet Take 1 tablet (90 mg) by mouth every 12 hours To protect your stent(s).  Do not stop taking unless directed by cardiology. 180 tablet 3     hydrocortisone (ANUSOL-HC) 2.5 % cream Place rectally 3 times daily as needed       [DISCONTINUED] lisinopril (PRINIVIL/ZESTRIL) 20 MG tablet Take 1 tablet (20 mg) by mouth daily 90 tablet 3       ALLERGIES   No Known  "Allergies    PAST MEDICAL HISTORY:  No past medical history on file.    PAST SURGICAL HISTORY:  No past surgical history on file.    FAMILY HISTORY:  No family history on file.    SOCIAL HISTORY:  Social History     Social History     Marital Status:      Spouse Name: N/A     Number of Children: N/A     Years of Education: N/A     Social History Main Topics     Smoking status: Never Smoker      Smokeless tobacco: Not on file     Alcohol Use: Not on file     Drug Use: Not on file     Sexual Activity: Not on file     Other Topics Concern     Not on file     Social History Narrative     No narrative on file       Review of Systems:  Skin:  Negative     Eyes:  Negative    ENT:  Positive for postnasal drainage  Respiratory:  Positive for cough  Cardiovascular:    Positive for;lightheadedness  Gastroenterology: Negative    Genitourinary:  Negative    Musculoskeletal:  Negative    Neurologic:  Negative    Psychiatric:  Negative    Heme/Lymph/Imm:  Negative    Endocrine:  Negative      Physical Exam:  Vitals: BP 82/60 mmHg  Pulse 59  Resp 16  Ht 1.88 m (6' 2\")  Wt 116.348 kg (256 lb 8 oz)  BMI 32.92 kg/m2  SpO2 97%   Please refer to dictation for physical exam    Recent Lab Results:  LIPID RESULTS:  Lab Results   Component Value Date    CHOL 143 02/03/2017    HDL 31* 02/03/2017    LDL 93 02/03/2017    TRIG 96 02/03/2017       LIVER ENZYME RESULTS:  Lab Results   Component Value Date    ALT <5* 02/03/2017       CBC RESULTS:  Lab Results   Component Value Date    WBC 13.1* 01/25/2017    RBC 4.90 01/25/2017    HGB 15.5 01/25/2017    HCT 43.1 01/25/2017    MCV 88 01/25/2017    MCH 31.6 01/25/2017    MCHC 36.0 01/25/2017    RDW 13.0 01/25/2017     01/25/2017       BMP RESULTS:  Lab Results   Component Value Date     02/03/2017    POTASSIUM 4.6 02/03/2017    CHLORIDE 103 02/03/2017    CO2 24 02/03/2017    ANIONGAP 14.6 02/03/2017    GLC 97 02/03/2017    BUN 22 02/03/2017    CR 1.44* 02/03/2017    " GFRESTIMATED 51* 02/03/2017    GFRESTBLACK 61 02/03/2017    LUCA 9.5 02/03/2017        A1C RESULTS:  Lab Results   Component Value Date    A1C 5.8 01/25/2017       INR RESULTS:  Lab Results   Component Value Date    INR 1.02 01/24/2017           CC  Maria Victoria Shields PA-C  [facility] AT [address]  [place] [address]

## 2017-02-04 NOTE — PROGRESS NOTES
PRIMARY CARDIOLOGIST:  Dr. Joe.      REASON FOR VISIT:  STEMI followup.      HISTORY OF PRESENT ILLNESS:  Mr. Butterfield is a delightful 56-year-old gentleman who had past medical history significant only for strong family history of coronary artery disease and borderline hypertension when he developed severe chest pain while moving furniture.  He presented to the ER and was found to have an anterior STEMI and occluded mid-LAD.  He had a drug-eluting stent placed to the large mid-LAD and a POBA to ostial D4 due to jailing.  He did well post-procedure and he had remaining 85% stenosed right PDA that was stented in a staged procedure on 01/27.  At that time, his LAD stent was found to be completely patent and his diagonal was jailed.  He has a remaining 30% circ lesion.  His EF was found to be decreased in the 40%-45% range but was without signs or symptoms of heart failure during that admission.  Blood pressure was elevated, and the carvedilol and lisinopril were added and up-titrated during his stay, but he had no complications or rhythm abnormalities during his stay.      He comes in today for followup of these events.  He has overall been feeling well.  He does get lightheaded from time to time, but he has not had syncope or near-syncope, no additional chest pain, shortness of breath, orthopnea or PND.  No leg swelling.  He has been taking his medications as directed.  He has been bored this week, as he is not back to work.  He has been doing cardiac rehab without difficulty.      SOCIAL HISTORY:  He is .  He has several children, a son and fiancee and a daughter.  [place], and he previously waterskied and wakeboarded.  He has not done that in some time.  He is a lifelong nonsmoker, no alcohol use, no drug use.      PHYSICAL EXAMINATION:   GENERAL:  Well-developed, well-nourished, fit-appearing gentleman in no acute distress.   HEENT:  Normocephalic, atraumatic.   HEART:  Regular in  rate and rhythm, no murmur, rub or gallop.  Carotids are without bruit.   LUNGS:  Clear with the exception of the right lung being slightly diminished.  There are no wheezes, rales or rhonchi.   EXTREMITIES:  Without peripheral edema.  His right femoral pulse is 2+ without bruit.  He does have an area of ecchymosis around his vascular access site.  There does not appear to be a hematoma under the skin.  There is a pea-sized nodule under the skin consistent with a vascular plug.      ASSESSMENT AND PLAN:   1.  Recent STEMI with a drug-eluting stent placed to the mid-LAD and RPDA in a staged procedure.  He has just remaining 30% circumflex lesion.  His troponins peaked at 130s.  This was a fairly large MI, and he was appropriately started on aspirin, statin, beta blocker, ACE and Brilinta as well as nitro.  It does appear that we have overshot his blood pressure control.  It was quite high in the hospital, but he comes in today and is fairly hypotensive with blood pressure 82/60 on initial check at 98/70 on the other arm.  Fortunately, he has not been syncopal or near-syncopal and he has no anginal symptoms.   2.  Hypertension, now hypotensive.  Will decrease lisinopril to 10 mg a day from 20 mg a day.  In addition, his creatinine is elevated today to 1.44, likely secondary to the lisinopril, but in part probably due to hypotension.  I do expect this to improve.  Will repeat labs on this in 1-2 weeks.   3.  Dyslipidemia with LDL on admission at 121.  This was repeated a bit early today, but it has already dropped to 93 with an HDL of 31.  He has improved his diet and is exercising.      I am going to have him check his home blood pressure to see if we can at least get him in the 90s consistently or even over 100.  While high-dose ACE and beta blocker is beneficial for his cardiomyopathy, certainly we do not want him hypotensive or cause him to be syncopal or near syncopal.  I am hopeful, as he presented early, that we  will see some recovery in his EF.  He is to see his primary care provider in about 2 weeks.  He will then also get labs at that time.  He will continue cardiac rehab and then follow up with Dr. Joe with an echocardiogram, BMP, ALT and lipid panel in about 2-1/2 months.  I would be happy to see him at any time after that or in between with concerns.  Thank you for allowing me to participate in his care.      STEVENSON Scales PA-C             D: 2017 16:10   T: 2017 05:00   MT: MIL      Name:     MARIAN GASPAR   MRN:      [id number]        Account:      [id number]   :      1960           Service Date: 2017      Document: [id number]

## 2017-02-07 ENCOUNTER — HOSPITAL ENCOUNTER (OUTPATIENT)
Dept: CARDIAC REHAB | Facility: CLINIC | Age: 57
End: 2017-02-07
Attending: PHYSICIAN ASSISTANT
Payer: COMMERCIAL

## 2017-02-07 PROCEDURE — 93798 PHYS/QHP OP CAR RHAB W/ECG: CPT | Performed by: REHABILITATION PRACTITIONER

## 2017-02-07 PROCEDURE — 40000116 ZZH STATISTIC OP CR VISIT: Performed by: REHABILITATION PRACTITIONER

## 2017-02-09 ENCOUNTER — HOSPITAL ENCOUNTER (OUTPATIENT)
Dept: CARDIAC REHAB | Facility: CLINIC | Age: 57
End: 2017-02-09
Attending: PHYSICIAN ASSISTANT
Payer: COMMERCIAL

## 2017-02-09 VITALS — HEIGHT: 74 IN | BODY MASS INDEX: 32.34 KG/M2 | WEIGHT: 252 LBS

## 2017-02-09 PROCEDURE — 40000116 ZZH STATISTIC OP CR VISIT

## 2017-02-09 PROCEDURE — 93798 PHYS/QHP OP CAR RHAB W/ECG: CPT

## 2017-02-09 ASSESSMENT — 6 MINUTE WALK TEST (6MWT)
MALE CALC: 2054.67
FEMALE CALC: 1547.87
TOTAL DISTANCE WALKED (FT): 1665
PREDICTED: 2067.2
GENDER SELECTION: MALE

## 2017-02-09 NOTE — PROGRESS NOTES
02/09/17 1600   Session   Session 60 Day Individualized Treatment Plan   Certified through this date 03/31/17   Mark Butterfield  56 year old  Physician cosignature/electronic signature indicates approval of this ITP document. I have established, reviewed and made necessary changes to the individualized treatment plan and exercise prescription for this patient.  Cardiac Rehab Assessment   Cardiac Rehab Assessment Patient was admitted on 1/24/2017 with severe chest pain while moving furniture with his brother and found to have a STEMI.  He went emergently to the cath lab and was found to have an occluded mid LAD.  LIZY placed to large mid LAD, POBA to ostial D4 due to jailing.  He did well post procedure.  After discussion, it was elected to complete his revascularization with staged PCI to the 85% stenosed mid rPDA with LIZY placed 1/27.  At that procedure he LAD stent was noted to be patent and diagonal jailed.  Patient reports that he has been feeling great since his stents were placed, denies any anginal symptoms.  He has already made significant changes to his diet and has started walking with his wife at the mall.   1/31 Patient has attended only the EVAL sessions when MD present to sign ITPs. 2/9/17 Met with patient during exercise for update/ ITP. Patient is on 6th exercise session and is making good progress so far. He currently tolerates 5.4 METS and is pleased with his progress. Patient will try elliptical next exercise session. He is working towards weight loss and achieved a weight loss of 8# so far. Patient continues to benefit from skilled therapy to monitor CV response to exercise, to educate on risk management and behavior change.    General Information   Treatment Diagnosis STEMI   Date of Treatment Diagnosis 01/24/17   Secondary Treatment Diagnosis Stent   Significant Past CV History None   Comorbidities None   Lead up symptoms chest pressure, SOB, diaphoresis,    Hospital Location [facility]  "Discharge Date 01/27/17   Signs and Symptoms Post Hospital Discharge None   Outpatient Cardiac Rehab Start Date 01/31/17   Primary Physician Dr. Chaz Contreras  ([phone])   Cardiologist Dr. Joe   Cardiologist Follow Up Scheduled   Ejection Fraction 40-45%   Risk Stratification Moderate   Living and Work Status    Living Arrangements and Social Status house   Support System Live with an adult   Return to Employment Yes   Preventative Medications   CMS recommended medications Ace inhibitors;Antiplatelets;Beta Blocker;Lipid Lowering   Falls Screen   Have you fallen two or more times in the past year? No   Have you fallen and had an injury in the past year? No   Pain   Patient Currently in Pain No   Physical Assessments   Incisions WNL   Edema None   Right Lung Sounds not assessed   Left Lung Sounds not assessed   Limitations No limitations   Individualized Treatment Plan   Monitored Sessions Scheduled 24   Monitored Sessions Attended 6   Oxygen   Supplemental Oxygen needed No   Nutrition Management - Weight Management   Assessment Re-assessment   Age 56   Weight 114.306 kg (252 lb)   Height 1.88 m (6' 2.02\")   BMI (Calculated) 32.41   Initial Rate Your Plate Score. Dietary tool to assess eating patterns. Scores range from 24 to 72. The higher the score the healthier the eating pattern. 33   Weight Management Comments 2/9/17 Patient is down 8# since starting rehab. Patient had been drinking 5-6 pepsis per day. He now drinks <1. Patient is also following the mediterranean diet cloestly.    Nutrition Management - Lipids   Lipids Labs Available   Date 01/25/17   Total Cholesterol 178   Triglycerides 60   HDL 45      Prescribed Lipid Medication Yes   Statin Intensity Intensity Not Indicated   Nutrition Management - Diabetes   Diabetes No   Nutrition Management Summary   Dietary Recommendations Mediterranean   Stages of Change for Diet Compliance Action   Nutrition Summary Comments 2/9/17 Patient is following " mediterranean diet closely.  He reduced his sodium intake to 2,000mg daily. He had been drinking 5-6 pepsis per day and is now down to <1.   Psychosocial Management   Psychosocial Assessment Initial   Is there history of clinical depression or increased risk of depression? No previous history   Current Level of Stress per Patient Report Mild   Initial Patient Health Questionnaire -9 Score (PHQ-9) for depression. 5-9 Minimal symptoms, 10-14 Minor depression, 15-19 Major depression, moderately severe, > 20 Major depression, severe  6   Initial [facility] Survey score.  Quality of Life:   If total score > 25 review individual areas where patient rated a 4 or 5.  Consider patients current medical condition and what role that plays on the score.   Adjust treatment protocol to improve areas of concern.  Consider the following:  PHQ9 score, DASI, and re-assessment within the next 30 days to assist with developing treatments.  18   Stages of Change Maintenance   Psychosocial Comments 2/9/17 No change with stress/ stress management.    Other Core Components - Hypertension   History of or Diagnosis of Hypertension No   Currently taking Anti-Hypertensives Yes;Beta blocker;Ace Inhibitor   Hypertension Comments 2/9/17 BP has been low several times after exercise. BP medication has been adjusted and patient now denies sx of dizziness/ lightheadedness.    Other Core Components - Tobacco   History of Tobacco Use Never   Other Core Components Summary   Interventions Planned None   Activity/Exercise History   Activity/Exercise Assessment Re-assessment   Activity/Exercise Status prior to event? Was Physically Active   Number of Days Currently participating in Moderate Physical Activity? 5   Number of Days Currently performing  Aerobic Exercise (including rehab)? 5   Number of Minutes per Session Currently of Aerobic Exercise (average)? 30-36   Current Stage of Change (Physical Activity) Preparation   Current Stage of Change  (Aerobic Exercise) Preparation   Patient Goals Goal #1   Goal #1 Description Increase exercise intensity to 6-7 METs inorder to return to activities such as chopping wood and portaging a canoe.   Goal #1 Target Date 04/28/17   Goal #1 Progress Towards Goal 2/9/17 Patient is making good progress in rehab. He currently tolerates a MET level of 5.4 and is pleased with his progress.  He is progressing nicely to achieve this goal.    Activity/Exercise Target Outcome An Accumulation of 150  Minutes of Aerobic Activity per Week   Exercise Assessment   6 Minute Walk Predicted - Gender Selection Male   6 Minute Walk Predicted (Male) 2054.67   6 Minute Walk Predicted (Female) 1547.87   Initial 6 Minute Walk Distance (Feet) 1665 ft   Resting HR 65 bpm   Exercise  bpm   Post Exercise HR 72 bpm   Resting /64 mmHg   Exercise /70 mmHg   Post Exercise BP 88/60 mmHg   Current MET Level 5.4   MET Level Goal 6-7   ECG Rhythm Sinus rhythm   Ectopy None   Current Symptoms Denies symptoms   Limitations/Restrictions None   Exercise Prescription   Mode Treadmill;Upright bike;Weights   Duration/Time 30-45 min   Frequency 3 daysweek   THR (85% of age predicted max HR) 138.55   OMNI Effort Rating (0-10 Scale) 4-6/10   Progression Continuous bouts;Total exercise time of 30-45 minutes;Aerobic exercise to OMNI rating of 6 or below and at or below THR;Progress peak intensity by 1/2 MET per week   Recommended Home Exercise   Type of Exercise Walking   Frequency (days per week) 2-3   Duration (minutes per session) 30-45 min   Effort Rating Recommended 4-6/10   Current Home Exercise   Type of Exercise Walking   Frequency (days per week) 2   Duration (minutes per session) 45   Follow-up/On-going Support   Provider follow-up needed on the following No follow-up needed   Learning Assessment   Learner Patient   Primary Language English   Preferred Learning Style Listening;Demonstration   Barriers to Learning No barriers noted   Patient  Education   Education recommended Nutrition;Exercise Principles

## 2017-02-10 ENCOUNTER — HOSPITAL ENCOUNTER (OUTPATIENT)
Dept: CARDIAC REHAB | Facility: CLINIC | Age: 57
End: 2017-02-10
Attending: PHYSICIAN ASSISTANT
Payer: COMMERCIAL

## 2017-02-10 PROCEDURE — 40000116 ZZH STATISTIC OP CR VISIT: Performed by: OCCUPATIONAL THERAPIST

## 2017-02-10 PROCEDURE — 93798 PHYS/QHP OP CAR RHAB W/ECG: CPT | Performed by: OCCUPATIONAL THERAPIST

## 2017-02-13 ENCOUNTER — TELEPHONE (OUTPATIENT)
Dept: CARDIOLOGY | Facility: CLINIC | Age: 57
End: 2017-02-13

## 2017-02-13 ENCOUNTER — HOSPITAL ENCOUNTER (OUTPATIENT)
Dept: CARDIAC REHAB | Facility: CLINIC | Age: 57
End: 2017-02-13
Attending: PHYSICIAN ASSISTANT
Payer: COMMERCIAL

## 2017-02-13 DIAGNOSIS — I10 BENIGN ESSENTIAL HTN: ICD-10-CM

## 2017-02-13 DIAGNOSIS — I25.5 ISCHEMIC CARDIOMYOPATHY: ICD-10-CM

## 2017-02-13 DIAGNOSIS — I21.3 ST ELEVATION MYOCARDIAL INFARCTION (STEMI), UNSPECIFIED ARTERY (H): ICD-10-CM

## 2017-02-13 DIAGNOSIS — E78.5 DYSLIPIDEMIA, GOAL LDL BELOW 70: ICD-10-CM

## 2017-02-13 PROCEDURE — 40000116 ZZH STATISTIC OP CR VISIT: Performed by: REHABILITATION PRACTITIONER

## 2017-02-13 PROCEDURE — 93798 PHYS/QHP OP CAR RHAB W/ECG: CPT | Performed by: REHABILITATION PRACTITIONER

## 2017-02-13 RX ORDER — CARVEDILOL 12.5 MG/1
12.5 TABLET ORAL 2 TIMES DAILY WITH MEALS
Qty: 180 TABLET | Refills: 1 | Status: SHIPPED | OUTPATIENT
Start: 2017-02-13 | End: 2017-10-19

## 2017-02-13 RX ORDER — ATORVASTATIN CALCIUM 80 MG/1
80 TABLET, FILM COATED ORAL AT BEDTIME
Qty: 90 TABLET | Refills: 1 | Status: SHIPPED | OUTPATIENT
Start: 2017-02-13 | End: 2017-10-19

## 2017-02-13 RX ORDER — NITROGLYCERIN 0.4 MG/1
0.4 TABLET SUBLINGUAL EVERY 5 MIN PRN
Qty: 25 TABLET | Refills: 1 | Status: SHIPPED | OUTPATIENT
Start: 2017-02-13 | End: 2018-08-30

## 2017-02-13 NOTE — TELEPHONE ENCOUNTER
Pt calls to request that his cardiac rx's be transferred to [facility] in [place]. They already have rx's for lisinopril so sent for ASA, atorvastatin, carvedilol, ntg and brilinta. Asked pt to call w/ questions. He has about a wk's worth of pills left.

## 2017-02-15 ENCOUNTER — HOSPITAL ENCOUNTER (OUTPATIENT)
Dept: CARDIAC REHAB | Facility: CLINIC | Age: 57
End: 2017-02-15
Attending: PHYSICIAN ASSISTANT
Payer: COMMERCIAL

## 2017-02-15 PROCEDURE — 93798 PHYS/QHP OP CAR RHAB W/ECG: CPT | Performed by: REHABILITATION PRACTITIONER

## 2017-02-15 PROCEDURE — 40000116 ZZH STATISTIC OP CR VISIT: Performed by: REHABILITATION PRACTITIONER

## 2017-02-16 ENCOUNTER — HOSPITAL ENCOUNTER (OUTPATIENT)
Dept: CARDIAC REHAB | Facility: CLINIC | Age: 57
End: 2017-02-16
Attending: PHYSICIAN ASSISTANT
Payer: COMMERCIAL

## 2017-02-16 PROCEDURE — 40000116 ZZH STATISTIC OP CR VISIT: Performed by: OCCUPATIONAL THERAPIST

## 2017-02-16 PROCEDURE — 93798 PHYS/QHP OP CAR RHAB W/ECG: CPT | Performed by: OCCUPATIONAL THERAPIST

## 2017-02-16 ASSESSMENT — 6 MINUTE WALK TEST (6MWT)
TOTAL DISTANCE WALKED (FT): 1665
GENDER SELECTION: MALE

## 2017-02-17 ENCOUNTER — TELEPHONE (OUTPATIENT)
Dept: CARDIOLOGY | Facility: CLINIC | Age: 57
End: 2017-02-17

## 2017-02-17 DIAGNOSIS — I10 BENIGN ESSENTIAL HTN: ICD-10-CM

## 2017-02-17 DIAGNOSIS — I25.5 ISCHEMIC CARDIOMYOPATHY: ICD-10-CM

## 2017-02-17 DIAGNOSIS — I21.3 ST ELEVATION MYOCARDIAL INFARCTION (STEMI), UNSPECIFIED ARTERY (H): ICD-10-CM

## 2017-02-17 LAB
ANION GAP SERPL CALCULATED.3IONS-SCNC: 12.7 MMOL/L (ref 6–17)
BUN SERPL-MCNC: 17 MG/DL (ref 7–30)
CALCIUM SERPL-MCNC: 9.6 MG/DL (ref 8.5–10.5)
CHLORIDE SERPL-SCNC: 106 MMOL/L (ref 98–107)
CO2 SERPL-SCNC: 26 MMOL/L (ref 23–29)
CREAT SERPL-MCNC: 1.5 MG/DL (ref 0.7–1.3)
GFR SERPL CREATININE-BSD FRML MDRD: 48 ML/MIN/1.7M2
GLUCOSE SERPL-MCNC: 103 MG/DL (ref 70–105)
POTASSIUM SERPL-SCNC: 4.7 MMOL/L (ref 3.5–5.1)
SODIUM SERPL-SCNC: 140 MMOL/L (ref 136–145)

## 2017-02-17 PROCEDURE — 36415 COLL VENOUS BLD VENIPUNCTURE: CPT | Performed by: PHYSICIAN ASSISTANT

## 2017-02-17 PROCEDURE — 80048 BASIC METABOLIC PNL TOTAL CA: CPT | Performed by: PHYSICIAN ASSISTANT

## 2017-02-17 RX ORDER — LISINOPRIL 10 MG/1
5 TABLET ORAL DAILY
COMMUNITY
Start: 2017-02-17 | End: 2018-08-30

## 2017-02-17 NOTE — TELEPHONE ENCOUNTER
Notes Recorded by Maria Victoria Shields PA-C on 2/17/2017 at 3:27 PM  Cr remains elevated- please have him decrease lisinopril to 5 mg daily.  Maria Victoria Shields PA-C 2/17/2017 3:27 PM      Called pt, no answer. Left detailed message for pt with results and changes. Asked for call back to verify. ANNE MARIE Mohr

## 2017-02-20 ENCOUNTER — HOSPITAL ENCOUNTER (OUTPATIENT)
Dept: CARDIAC REHAB | Facility: CLINIC | Age: 57
End: 2017-02-20
Attending: PHYSICIAN ASSISTANT
Payer: COMMERCIAL

## 2017-02-20 PROCEDURE — 93798 PHYS/QHP OP CAR RHAB W/ECG: CPT | Performed by: REHABILITATION PRACTITIONER

## 2017-02-20 PROCEDURE — 40000116 ZZH STATISTIC OP CR VISIT: Performed by: REHABILITATION PRACTITIONER

## 2017-02-21 ENCOUNTER — HOSPITAL ENCOUNTER (OUTPATIENT)
Dept: CARDIAC REHAB | Facility: CLINIC | Age: 57
End: 2017-02-21
Attending: PHYSICIAN ASSISTANT
Payer: COMMERCIAL

## 2017-02-21 PROCEDURE — 93798 PHYS/QHP OP CAR RHAB W/ECG: CPT

## 2017-02-21 PROCEDURE — 40000116 ZZH STATISTIC OP CR VISIT

## 2017-02-23 ENCOUNTER — HOSPITAL ENCOUNTER (OUTPATIENT)
Dept: CARDIAC REHAB | Facility: CLINIC | Age: 57
End: 2017-02-23
Attending: PHYSICIAN ASSISTANT
Payer: COMMERCIAL

## 2017-02-23 PROCEDURE — 93798 PHYS/QHP OP CAR RHAB W/ECG: CPT

## 2017-02-23 PROCEDURE — 40000116 ZZH STATISTIC OP CR VISIT

## 2017-02-27 ENCOUNTER — HOSPITAL ENCOUNTER (OUTPATIENT)
Dept: CARDIAC REHAB | Facility: CLINIC | Age: 57
End: 2017-02-27
Attending: PHYSICIAN ASSISTANT
Payer: COMMERCIAL

## 2017-02-27 PROCEDURE — 93798 PHYS/QHP OP CAR RHAB W/ECG: CPT

## 2017-02-27 PROCEDURE — 40000116 ZZH STATISTIC OP CR VISIT

## 2017-02-28 ENCOUNTER — HOSPITAL ENCOUNTER (OUTPATIENT)
Dept: CARDIAC REHAB | Facility: CLINIC | Age: 57
End: 2017-02-28
Attending: PHYSICIAN ASSISTANT
Payer: COMMERCIAL

## 2017-02-28 PROCEDURE — 93798 PHYS/QHP OP CAR RHAB W/ECG: CPT | Performed by: REHABILITATION PRACTITIONER

## 2017-02-28 PROCEDURE — 40000116 ZZH STATISTIC OP CR VISIT: Performed by: REHABILITATION PRACTITIONER

## 2017-03-02 ENCOUNTER — HOSPITAL ENCOUNTER (OUTPATIENT)
Dept: CARDIAC REHAB | Facility: CLINIC | Age: 57
End: 2017-03-02
Attending: PHYSICIAN ASSISTANT
Payer: COMMERCIAL

## 2017-03-02 PROCEDURE — 93798 PHYS/QHP OP CAR RHAB W/ECG: CPT

## 2017-03-02 PROCEDURE — 40000116 ZZH STATISTIC OP CR VISIT

## 2017-03-06 ENCOUNTER — MEDICAL CORRESPONDENCE (OUTPATIENT)
Dept: CARDIOLOGY | Facility: CLINIC | Age: 57
End: 2017-03-06

## 2017-03-06 ENCOUNTER — HOSPITAL ENCOUNTER (OUTPATIENT)
Dept: CARDIAC REHAB | Facility: CLINIC | Age: 57
End: 2017-03-06
Attending: PHYSICIAN ASSISTANT
Payer: COMMERCIAL

## 2017-03-06 PROCEDURE — 40000116 ZZH STATISTIC OP CR VISIT

## 2017-03-06 PROCEDURE — 93798 PHYS/QHP OP CAR RHAB W/ECG: CPT

## 2017-03-07 ENCOUNTER — HOSPITAL ENCOUNTER (OUTPATIENT)
Dept: CARDIAC REHAB | Facility: CLINIC | Age: 57
End: 2017-03-07
Attending: PHYSICIAN ASSISTANT
Payer: COMMERCIAL

## 2017-03-07 VITALS — HEIGHT: 74 IN | BODY MASS INDEX: 31.75 KG/M2 | WEIGHT: 247.4 LBS

## 2017-03-07 PROCEDURE — 93798 PHYS/QHP OP CAR RHAB W/ECG: CPT

## 2017-03-07 PROCEDURE — 40000116 ZZH STATISTIC OP CR VISIT

## 2017-03-07 ASSESSMENT — 6 MINUTE WALK TEST (6MWT)
TOTAL DISTANCE WALKED (FT): 1665
PREDICTED: 2079.35
FEMALE CALC: 1563.58
GENDER SELECTION: MALE
MALE CALC: 2066.74

## 2017-03-07 NOTE — PROGRESS NOTES
Physician cosignature/electronic signature indicates approval of this ITP document. I have established, reviewed and made necessary changes to the individualized treatment plan and exercise prescription for this patient.       03/07/17 1700   Session   Session 90 Day ITP   Certified through this date 04/22/17   Cardiac Rehab Assessment   Cardiac Rehab Assessment Patient was admitted on 1/24/2017 with severe chest pain while moving furniture with his brother and found to have a STEMI.  He went emergently to the cath lab and was found to have an occluded mid LAD.  LIZY placed to large mid LAD, POBA to ostial D4 due to jailing.  He did well post procedure.  After discussion, it was elected to complete his revascularization with staged PCI to the 85% stenosed mid rPDA with LIZY placed 1/27.  At that procedure he LAD stent was noted to be patent and diagonal jailed.  Patient reports that he has been feeling great since his stents were placed, denies any anginal symptoms.  He has already made significant changes to his diet and has started walking with his wife at the mall.   1/31 Patient has attended only the EVAL sessions when MD present to sign ITPs. 2/9/17 Met with patient during exercise for update/ ITP. Patient is on 6th exercise session and is making good progress so far. He currently tolerates 5.4 METS and is pleased with his progress.  Patient will try elliptical next exercise session. He is working towards weight loss and achieved a weight loss of 8# so far. Patient continues to benefit from skilled therapy to monitor CV response to exercise, to educate on risk management and behavior change.  2/16 Pt was seen for a short private consult.  Content was primarily education regarding his EF and recovery process, impact on home and work duties, potential for improvement possibilities.  Pt is frustrated with the limitations and yet has some anxiety about how his progress will actually go.  3/7 Patient has been  progressing well in rehab.  He has reached his initial MET level goal and goal was amended.  Patient recently had his lisinopril decreased.  Patients BP remains low at times but he denies any lightheadedness.  Faxed his BP log over to [facility] heart last session for their review.  Patients weight has fluctuated but overall he reports having lost 12lbs since his MI.  Patient continues to benefit from skilled intervention in order to monitor BPs, for continued weight loss and increased exercise intensities with new interval training.   General Information   Treatment Diagnosis STEMI   Date of Treatment Diagnosis 01/24/17   Secondary Treatment Diagnosis Stent   Significant Past CV History None   Comorbidities None   Lead up symptoms chest pressure, SOB, diaphoresis,    Hospital Location [facility]   Hospital Discharge Date 01/27/17   Signs and Symptoms Post Hospital Discharge None   Outpatient Cardiac Rehab Start Date 01/31/17   Primary Physician Dr. Chaz Contreras  ([phone])   Cardiologist Dr. Joe   Cardiologist Follow Up Scheduled   Ejection Fraction 40-45%   Risk Stratification Moderate   Living and Work Status    Living Arrangements and Social Status house   Support System Live with an adult   Return to Employment Yes   Preventative Medications   CMS recommended medications Ace inhibitors;Antiplatelets;Beta Blocker;Lipid Lowering   Falls Screen   Have you fallen two or more times in the past year? No   Have you fallen and had an injury in the past year? No   Pain   Patient Currently in Pain No   Physical Assessments   Incisions Not assessed   Edema None   Right Lung Sounds not assessed   Left Lung Sounds not assessed   Limitations No limitations   Individualized Treatment Plan   Monitored Sessions Scheduled 24   Monitored Sessions Attended 17   Oxygen   Supplemental Oxygen needed No   Nutrition Management - Weight Management   Assessment Re-assessment   Age 56   Weight 112.2 kg (247 lb 6.4 oz)   Height 1.88 m (6'  "2.02\")   BMI (Calculated) 31.82   Initial Rate Your Plate Score. Dietary tool to assess eating patterns. Scores range from 24 to 72. The higher the score the healthier the eating pattern. 33   Weight Management Comments 2/9/17 Patient is down 8# since starting rehab. Patient had been drinking 5-6 pepsis per day. He now drinks <1. Patient is also following the mediterranean diet cloestly.    Nutrition Management - Lipids   Lipids Labs Available   Date 01/25/17   Total Cholesterol 178   Triglycerides 60   HDL 45      Prescribed Lipid Medication Yes   Statin Intensity Intensity Not Indicated   Nutrition Management - Diabetes   Diabetes No   Nutrition Management Summary   Dietary Recommendations Mediterranean   Stages of Change for Diet Compliance Action   Nutrition Summary Comments 2/9/17 Patient is following mediterranean diet closely.  He reduced his sodium intake to 2,000mg daily. He had been drinking 5-6 pepsis per day and is now down to <1.   Psychosocial Management   Psychosocial Assessment Re-assessment   Is there history of clinical depression or increased risk of depression? No previous history   Current Level of Stress per Patient Report Mild   Initial Patient Health Questionnaire -9 Score (PHQ-9) for depression. 5-9 Minimal symptoms, 10-14 Minor depression, 15-19 Major depression, moderately severe, > 20 Major depression, severe  6   Initial [facility] Survey score.  Quality of Life:   If total score > 25 review individual areas where patient rated a 4 or 5.  Consider patients current medical condition and what role that plays on the score.   Adjust treatment protocol to improve areas of concern.  Consider the following:  PHQ9 score, DASI, and re-assessment within the next 30 days to assist with developing treatments.  18   Stages of Change Maintenance   Psychosocial Comments 2/9/17 No change with stress/ stress management.  2/16 Pt does admit to some anxiety regarding whether his EF will return " to normal.   Other Core Components - Hypertension   History of or Diagnosis of Hypertension No   Currently taking Anti-Hypertensives Yes;Beta blocker;Ace Inhibitor   Hypertension Comments 2/9/17 BP has been low several times after exercise. BP medication has been adjusted and patient now denies sx of dizziness/ lightheadedness.    Other Core Components - Tobacco   History of Tobacco Use Never   Other Core Components Summary   Interventions Planned None   Activity/Exercise History   Activity/Exercise Assessment Re-assessment   Activity/Exercise Status prior to event? Was Physically Active   Number of Days Currently participating in Moderate Physical Activity? 5   Number of Days Currently performing  Aerobic Exercise (including rehab)? 5   Number of Minutes per Session Currently of Aerobic Exercise (average)? 25-35   Current Stage of Change (Physical Activity) Preparation   Current Stage of Change (Aerobic Exercise) Action   Patient Goals Goal #1   Goal #1 Description Increase exercise intensity to 6-7 METs inorder to return to activities such as chopping wood and portaging a canoe.  Goal amended to 7-8 METs   Goal #1 Target Date 04/28/17   Goal #1 Progress Towards Goal 2/9/17 Patient is making good progress in rehab. He currently tolerates a MET level of 5.4 and is pleased with his progress.  He is progressing nicely to achieve this goal.  2/16 Pt wanted to discuss what the implications would be if his EF does not return to normal.  Reviewed some of the criteria, and what limits would be, but also tried to encourage Pt that he has a good potential for recovery.    3/7 Patient is currently tolerating 6-7 METs.  Will increase his MET level goal to 7-8 METs.  Will start interval training on the treadmill and progress to intervals on the elliptical.      Activity/Exercise Target Outcome An Accumulation of 150  Minutes of Aerobic Activity per Week   Exercise Assessment   6 Minute Walk Predicted - Gender Selection Male   6  Minute Walk Predicted (Male) 2066.74   6 Minute Walk Predicted (Female) 1563.58   Initial 6 Minute Walk Distance (Feet) 1665 ft   Resting HR 58 bpm   Exercise  bpm   Post Exercise HR 72 bpm   Resting /58   Exercise /60   Post Exercise BP 80/60   Current MET Level 6.6   MET Level Goal 6-7   ECG Rhythm Sinus rhythm   Ectopy None   Current Symptoms Denies symptoms   Limitations/Restrictions None   Exercise Prescription   Mode Treadmill;Upright bike;Weights   Duration/Time 30-45 min   Frequency 3 daysweek   THR (85% of age predicted max HR) 138.55   OMNI Effort Rating (0-10 Scale) 4-6/10   Progression Continuous bouts;Total exercise time of 30-45 minutes;Aerobic exercise to OMNI rating of 6 or below and at or below THR;Progress peak intensity by 1/2 MET per week   Recommended Home Exercise   Type of Exercise Walking   Frequency (days per week) 2-3   Duration (minutes per session) 30-45 min   Effort Rating Recommended 4-6/10   Current Home Exercise   Type of Exercise Walking   Frequency (days per week) 1-2   Duration (minutes per session) 20-25   Follow-up/On-going Support   Provider follow-up needed on the following No follow-up needed   Learning Assessment   Learner Patient   Primary Language English   Preferred Learning Style Listening;Demonstration   Barriers to Learning No barriers noted   Patient Education   Education recommended Nutrition;Exercise Principles   Education classes attended (short private consult on 2/16/2017)

## 2017-03-09 ENCOUNTER — HOSPITAL ENCOUNTER (OUTPATIENT)
Dept: CARDIAC REHAB | Facility: CLINIC | Age: 57
End: 2017-03-09
Attending: PHYSICIAN ASSISTANT
Payer: COMMERCIAL

## 2017-03-09 PROCEDURE — 93798 PHYS/QHP OP CAR RHAB W/ECG: CPT | Performed by: REHABILITATION PRACTITIONER

## 2017-03-09 PROCEDURE — 40000116 ZZH STATISTIC OP CR VISIT: Performed by: REHABILITATION PRACTITIONER

## 2017-03-13 ENCOUNTER — HOSPITAL ENCOUNTER (OUTPATIENT)
Dept: CARDIAC REHAB | Facility: CLINIC | Age: 57
End: 2017-03-13
Attending: PHYSICIAN ASSISTANT
Payer: COMMERCIAL

## 2017-03-13 PROCEDURE — 93798 PHYS/QHP OP CAR RHAB W/ECG: CPT

## 2017-03-13 PROCEDURE — 40000116 ZZH STATISTIC OP CR VISIT

## 2017-03-14 ENCOUNTER — HOSPITAL ENCOUNTER (OUTPATIENT)
Dept: CARDIAC REHAB | Facility: CLINIC | Age: 57
End: 2017-03-14
Attending: PHYSICIAN ASSISTANT
Payer: COMMERCIAL

## 2017-03-14 PROCEDURE — 93798 PHYS/QHP OP CAR RHAB W/ECG: CPT

## 2017-03-14 PROCEDURE — 40000116 ZZH STATISTIC OP CR VISIT

## 2017-03-20 ENCOUNTER — HOSPITAL ENCOUNTER (OUTPATIENT)
Dept: CARDIAC REHAB | Facility: CLINIC | Age: 57
End: 2017-03-20
Attending: PHYSICIAN ASSISTANT
Payer: COMMERCIAL

## 2017-03-20 PROCEDURE — 93798 PHYS/QHP OP CAR RHAB W/ECG: CPT | Performed by: REHABILITATION PRACTITIONER

## 2017-03-20 PROCEDURE — 40000116 ZZH STATISTIC OP CR VISIT: Performed by: REHABILITATION PRACTITIONER

## 2017-03-21 ENCOUNTER — HOSPITAL ENCOUNTER (OUTPATIENT)
Dept: CARDIAC REHAB | Facility: CLINIC | Age: 57
End: 2017-03-21
Attending: PHYSICIAN ASSISTANT
Payer: COMMERCIAL

## 2017-03-21 PROCEDURE — 40000116 ZZH STATISTIC OP CR VISIT

## 2017-03-21 PROCEDURE — 93798 PHYS/QHP OP CAR RHAB W/ECG: CPT

## 2017-03-23 ENCOUNTER — HOSPITAL ENCOUNTER (OUTPATIENT)
Dept: CARDIAC REHAB | Facility: CLINIC | Age: 57
End: 2017-03-23
Attending: PHYSICIAN ASSISTANT
Payer: COMMERCIAL

## 2017-03-23 PROCEDURE — 40000116 ZZH STATISTIC OP CR VISIT

## 2017-03-23 PROCEDURE — 93798 PHYS/QHP OP CAR RHAB W/ECG: CPT

## 2017-03-27 ENCOUNTER — HOSPITAL ENCOUNTER (OUTPATIENT)
Dept: CARDIAC REHAB | Facility: CLINIC | Age: 57
End: 2017-03-27
Attending: PHYSICIAN ASSISTANT
Payer: COMMERCIAL

## 2017-03-27 PROCEDURE — 93798 PHYS/QHP OP CAR RHAB W/ECG: CPT

## 2017-03-27 PROCEDURE — 40000116 ZZH STATISTIC OP CR VISIT

## 2017-03-28 ENCOUNTER — HOSPITAL ENCOUNTER (OUTPATIENT)
Dept: CARDIAC REHAB | Facility: CLINIC | Age: 57
End: 2017-03-28
Attending: PHYSICIAN ASSISTANT
Payer: COMMERCIAL

## 2017-03-28 VITALS — HEIGHT: 74 IN | WEIGHT: 247.4 LBS | BODY MASS INDEX: 31.75 KG/M2

## 2017-03-28 PROCEDURE — 40000116 ZZH STATISTIC OP CR VISIT

## 2017-03-28 PROCEDURE — 93798 PHYS/QHP OP CAR RHAB W/ECG: CPT

## 2017-03-28 ASSESSMENT — 6 MINUTE WALK TEST (6MWT)
FEMALE CALC: 1563.58
PREDICTED: 2079.35
MALE CALC: 2066.74
TOTAL DISTANCE WALKED (FT): 1665
GENDER SELECTION: MALE

## 2017-03-28 NOTE — PROGRESS NOTES
03/28/17 1700   Session  Mark Butterfield  MI Stent   Session 120 Day Individualized Treatment Plan   Certified through this date 05/13/17   Cardiac Rehab Assessment  Physician cosignature/electronic signature indicates approval of this ITP document. I have established, reviewed and made necessary changes to the individualized treatment plan and exercise prescription for this patient.     Cardiac Rehab Assessment Patient was admitted on 1/24/2017 with severe chest pain while moving furniture with his brother and found to have a STEMI.  He went emergently to the cath lab and was found to have an occluded mid LAD.  LIZY placed to large mid LAD, POBA to ostial D4 due to jailing.  He did well post procedure.  After discussion, it was elected to complete his revascularization with staged PCI to the 85% stenosed mid rPDA with LIZY placed 1/27.  At that procedure he LAD stent was noted to be patent and diagonal jailed.  Patient reports that he has been feeling great since his stents were placed, denies any anginal symptoms.  He has already made significant changes to his diet and has started walking with his wife at the mall.   1/31 Patient has attended only the EVAL sessions when MD present to sign ITPs. 2/9/17 Met with patient during exercise for update/ ITP. Patient is on 6th exercise session and is making good progress so far. He currently tolerates 5.4 METS and is pleased with his progress.  Patient will try elliptical next exercise session. He is working towards weight loss and achieved a weight loss of 8# so far. Patient continues to benefit from skilled therapy to monitor CV response to exercise, to educate on risk management and behavior change.  2/16 Pt was seen for a short private consult.  Content was primarily education regarding his EF and recovery process, impact on home and work duties, potential for improvement possibilities.  Pt is frustrated with the limitations and yet has some anxiety about how his  progress will actually go.  3/7 Patient has been progressing well in rehab.  He has reached his initial MET level goal and goal was amended.  Patient recently had his lisinopril decreased.  Patients BP remains low at times but he denies any lightheadedness.  Faxed his BP log over to [facility] heart last session for their review.  Patients weight has fluctuated but overall he reports having lost 12lbs since his MI.  Patient continues to benefit from skilled intervention in order to monitor BPs, for continued weight loss and increased exercise intensities with new interval training.  3/28 Patient continues to progress very well in rehab.  Patient continues to benefit from skilled intervention for monitored progressive exercise.with normal CV response.   General Information   Treatment Diagnosis STEMI   Date of Treatment Diagnosis 01/24/17   Secondary Treatment Diagnosis Stent   Significant Past CV History None   Comorbidities None   Lead up symptoms chest pressure, SOB, diaphoresis,    Hospital Location [facility]   Hospital Discharge Date 01/27/17   Signs and Symptoms Post Hospital Discharge None   Outpatient Cardiac Rehab Start Date 01/31/17   Primary Physician Dr. Chaz Contreras  ([phone])   Cardiologist Dr. Joe   Cardiologist Follow Up Scheduled   Ejection Fraction 40-45%   Risk Stratification Moderate   Living and Work Status    Living Arrangements and Social Status house   Support System Live with an adult   Return to Employment Yes   Preventative Medications   CMS recommended medications Ace inhibitors;Antiplatelets;Beta Blocker;Lipid Lowering   Falls Screen   Have you fallen two or more times in the past year? No   Have you fallen and had an injury in the past year? No   Pain   Patient Currently in Pain No   Physical Assessments   Incisions Not assessed   Edema Not assessed   Right Lung Sounds not assessed   Left Lung Sounds not assessed   Limitations No limitations   Individualized Treatment Plan   Monitored  "Sessions Scheduled 24   Monitored Sessions Attended 25   Oxygen   Supplemental Oxygen needed No   Nutrition Management - Weight Management   Assessment Re-assessment   Age 56   Weight 112.2 kg (247 lb 6.4 oz)   Height 1.88 m (6' 2.02\")   BMI (Calculated) 31.82   Initial Rate Your Plate Score. Dietary tool to assess eating patterns. Scores range from 24 to 72. The higher the score the healthier the eating pattern. 33   Weight Management Comments 2/9/17 Patient is down 8# since starting rehab. Patient had been drinking 5-6 pepsis per day. He now drinks <1. Patient is also following the mediterranean diet cloestly.    Nutrition Management - Lipids   Lipids Labs Available   Date 02/03/17   Total Cholesterol 143   Triglycerides 96   HDL 31   LDL 93   Prescribed Lipid Medication Yes   Statin Intensity Intensity Not Indicated   Nutrition Management - Diabetes   Diabetes No   Nutrition Management Summary   Dietary Recommendations Mediterranean   Stages of Change for Diet Compliance Action   Nutrition Summary Comments 2/9/17 Patient is following mediterranean diet closely.  He reduced his sodium intake to 2,000mg daily. He had been drinking 5-6 pepsis per day and is now down to <1.   Psychosocial Management   Psychosocial Assessment Re-assessment   Is there history of clinical depression or increased risk of depression? No previous history   Current Level of Stress per Patient Report Mild   Initial Patient Health Questionnaire -9 Score (PHQ-9) for depression. 5-9 Minimal symptoms, 10-14 Minor depression, 15-19 Major depression, moderately severe, > 20 Major depression, severe  6   Initial [facility] Survey score.  Quality of Life:   If total score > 25 review individual areas where patient rated a 4 or 5.  Consider patients current medical condition and what role that plays on the score.   Adjust treatment protocol to improve areas of concern.  Consider the following:  PHQ9 score, DASI, and re-assessment within the next " 30 days to assist with developing treatments.  18   Stages of Change Maintenance   Psychosocial Comments 2/9/17 No change with stress/ stress management.  2/16 Pt does admit to some anxiety regarding whether his EF will return to normal.   Other Core Components - Hypertension   History of or Diagnosis of Hypertension No   Currently taking Anti-Hypertensives Yes;Beta blocker;Ace Inhibitor   Hypertension Comments 2/9/17 BP has been low several times after exercise. BP medication has been adjusted and patient now denies sx of dizziness/ lightheadedness.    Other Core Components - Tobacco   History of Tobacco Use Never   Other Core Components Summary   Interventions Planned None   Activity/Exercise History   Activity/Exercise Assessment Re-assessment   Activity/Exercise Status prior to event? Was Physically Active   Number of Days Currently participating in Moderate Physical Activity? 5   Number of Days Currently performing  Aerobic Exercise (including rehab)? 5   Number of Minutes per Session Currently of Aerobic Exercise (average)? 25-35   Current Stage of Change (Physical Activity) Preparation   Current Stage of Change (Aerobic Exercise) Action   Patient Goals Goal #1   Goal #1 Description Increase exercise intensity to 6-7 METs inorder to return to activities such as chopping wood and portaging a canoe.  Goal amended to 7-8 METs   Goal #1 Target Date 04/28/17   Goal #1 Progress Towards Goal 2/9/17 Patient is making good progress in rehab. He currently tolerates a MET level of 5.4 and is pleased with his progress.  He is progressing nicely to achieve this goal.  2/16 Pt wanted to discuss what the implications would be if his EF does not return to normal.  Reviewed some of the criteria, and what limits would be, but also tried to encourage Pt that he has a good potential for recovery.    3/7 Patient is currently tolerating 6-7 METs.  Will increase his MET level goal to 7-8 METs.  Will start interval training on the  treadmill and progress to intervals on the elliptical.    3/28 Patient has reached 8.4 METs on the TM while doing intervals at 1 min.  WIll increase the intervals to 2 min next session.  Patient will be having an echo in 1-2 weeks to reassess EF.  Patient will discharge in the next 2-3 weeks.  CV response continue to be wnls with exercise.   Activity/Exercise Target Outcome An Accumulation of 150  Minutes of Aerobic Activity per Week   Exercise Assessment   6 Minute Walk Predicted - Gender Selection Male   6 Minute Walk Predicted (Male) 2066.74   6 Minute Walk Predicted (Female) 1563.58   Initial 6 Minute Walk Distance (Feet) 1665 ft   Resting HR 54 bpm   Exercise  bpm   Post Exercise HR 71 bpm   Resting BP 98/62   Exercise /64   Post Exercise BP 90/70   Current MET Level 8.4   MET Level Goal 6-7   ECG Rhythm Sinus rhythm   Ectopy None   Current Symptoms Denies symptoms   Limitations/Restrictions None   Exercise Prescription   Mode Treadmill;Upright bike;Weights   Duration/Time 30-45 min   Frequency 3 daysweek   THR (85% of age predicted max HR) 138.55   OMNI Effort Rating (0-10 Scale) 4-6/10   Progression Continuous bouts;Total exercise time of 30-45 minutes;Aerobic exercise to OMNI rating of 6 or below and at or below THR;Progress peak intensity by 1/2 MET per week   Recommended Home Exercise   Type of Exercise Walking   Frequency (days per week) 2-3   Duration (minutes per session) 30-45 min   Effort Rating Recommended 4-6/10   Current Home Exercise   Type of Exercise Walking   Frequency (days per week) 1-2   Duration (minutes per session) 20-25   Follow-up/On-going Support   Provider follow-up needed on the following No follow-up needed   Learning Assessment   Learner Patient   Primary Language English   Preferred Learning Style Listening;Demonstration   Barriers to Learning No barriers noted   Patient Education   Education recommended Nutrition;Exercise Principles   Education classes attended (short  private consult on 2/16/2017)

## 2017-03-30 ENCOUNTER — HOSPITAL ENCOUNTER (OUTPATIENT)
Dept: CARDIAC REHAB | Facility: CLINIC | Age: 57
End: 2017-03-30
Attending: PHYSICIAN ASSISTANT
Payer: COMMERCIAL

## 2017-03-30 PROCEDURE — 93798 PHYS/QHP OP CAR RHAB W/ECG: CPT | Performed by: OCCUPATIONAL THERAPIST

## 2017-03-30 PROCEDURE — 40000116 ZZH STATISTIC OP CR VISIT: Performed by: OCCUPATIONAL THERAPIST

## 2017-04-03 ENCOUNTER — HOSPITAL ENCOUNTER (OUTPATIENT)
Dept: CARDIAC REHAB | Facility: CLINIC | Age: 57
End: 2017-04-03
Attending: PHYSICIAN ASSISTANT
Payer: COMMERCIAL

## 2017-04-03 PROCEDURE — 93798 PHYS/QHP OP CAR RHAB W/ECG: CPT | Performed by: REHABILITATION PRACTITIONER

## 2017-04-03 PROCEDURE — 40000116 ZZH STATISTIC OP CR VISIT: Performed by: REHABILITATION PRACTITIONER

## 2017-04-04 ENCOUNTER — HOSPITAL ENCOUNTER (OUTPATIENT)
Dept: CARDIAC REHAB | Facility: CLINIC | Age: 57
End: 2017-04-04
Attending: PHYSICIAN ASSISTANT
Payer: COMMERCIAL

## 2017-04-04 PROCEDURE — 93798 PHYS/QHP OP CAR RHAB W/ECG: CPT

## 2017-04-04 PROCEDURE — 40000116 ZZH STATISTIC OP CR VISIT

## 2017-04-06 ENCOUNTER — HOSPITAL ENCOUNTER (OUTPATIENT)
Dept: CARDIAC REHAB | Facility: CLINIC | Age: 57
End: 2017-04-06
Attending: PHYSICIAN ASSISTANT
Payer: COMMERCIAL

## 2017-04-06 PROCEDURE — 93798 PHYS/QHP OP CAR RHAB W/ECG: CPT | Performed by: REHABILITATION PRACTITIONER

## 2017-04-06 PROCEDURE — 40000116 ZZH STATISTIC OP CR VISIT: Performed by: REHABILITATION PRACTITIONER

## 2017-04-10 ENCOUNTER — HOSPITAL ENCOUNTER (OUTPATIENT)
Dept: CARDIOLOGY | Facility: CLINIC | Age: 57
Discharge: HOME | End: 2017-04-10
Attending: PHYSICIAN ASSISTANT | Admitting: PHYSICIAN ASSISTANT
Payer: COMMERCIAL

## 2017-04-10 ENCOUNTER — HOSPITAL ENCOUNTER (OUTPATIENT)
Dept: CARDIAC REHAB | Facility: CLINIC | Age: 57
End: 2017-04-10
Attending: PHYSICIAN ASSISTANT
Payer: COMMERCIAL

## 2017-04-10 DIAGNOSIS — I25.5 ISCHEMIC CARDIOMYOPATHY: ICD-10-CM

## 2017-04-10 DIAGNOSIS — I21.02 ST ELEVATION (STEMI) MYOCARDIAL INFARCTION INVOLVING LEFT ANTERIOR DESCENDING CORONARY ARTERY (H): ICD-10-CM

## 2017-04-10 PROCEDURE — 93798 PHYS/QHP OP CAR RHAB W/ECG: CPT

## 2017-04-10 PROCEDURE — 93306 TTE W/DOPPLER COMPLETE: CPT | Mod: 26 | Performed by: INTERNAL MEDICINE

## 2017-04-10 PROCEDURE — 25500064 ZZH RX 255 OP 636: Performed by: PHYSICIAN ASSISTANT

## 2017-04-10 PROCEDURE — 40000264 ECHO COMPLETE WITH LUMASON

## 2017-04-10 PROCEDURE — 40000116 ZZH STATISTIC OP CR VISIT

## 2017-04-10 RX ADMIN — SULFUR HEXAFLUORIDE 2 ML: KIT at 11:51

## 2017-04-11 ENCOUNTER — HOSPITAL ENCOUNTER (OUTPATIENT)
Dept: CARDIAC REHAB | Facility: CLINIC | Age: 57
End: 2017-04-11
Attending: PHYSICIAN ASSISTANT
Payer: COMMERCIAL

## 2017-04-11 DIAGNOSIS — E78.5 DYSLIPIDEMIA, GOAL LDL BELOW 70: Primary | ICD-10-CM

## 2017-04-11 DIAGNOSIS — I25.5 ISCHEMIC CARDIOMYOPATHY: ICD-10-CM

## 2017-04-11 PROCEDURE — 93798 PHYS/QHP OP CAR RHAB W/ECG: CPT | Performed by: REHABILITATION PRACTITIONER

## 2017-04-11 PROCEDURE — 40000116 ZZH STATISTIC OP CR VISIT: Performed by: REHABILITATION PRACTITIONER

## 2017-04-12 ENCOUNTER — OFFICE VISIT (OUTPATIENT)
Dept: CARDIOLOGY | Facility: CLINIC | Age: 57
End: 2017-04-12
Attending: PHYSICIAN ASSISTANT
Payer: COMMERCIAL

## 2017-04-12 VITALS
HEART RATE: 66 BPM | HEIGHT: 74 IN | DIASTOLIC BLOOD PRESSURE: 64 MMHG | SYSTOLIC BLOOD PRESSURE: 110 MMHG | OXYGEN SATURATION: 99 % | WEIGHT: 250 LBS | BODY MASS INDEX: 32.08 KG/M2

## 2017-04-12 DIAGNOSIS — E78.5 DYSLIPIDEMIA, GOAL LDL BELOW 70: ICD-10-CM

## 2017-04-12 DIAGNOSIS — I21.3 ST ELEVATION MYOCARDIAL INFARCTION (STEMI), UNSPECIFIED ARTERY (H): ICD-10-CM

## 2017-04-12 DIAGNOSIS — I25.5 ISCHEMIC CARDIOMYOPATHY: ICD-10-CM

## 2017-04-12 DIAGNOSIS — I25.10 CORONARY ARTERY DISEASE INVOLVING NATIVE CORONARY ARTERY OF NATIVE HEART WITHOUT ANGINA PECTORIS: Primary | ICD-10-CM

## 2017-04-12 LAB
ALT SERPL W P-5'-P-CCNC: <5 U/L (ref 5–30)
ANION GAP SERPL CALCULATED.3IONS-SCNC: 11.5 MMOL/L (ref 6–17)
BUN SERPL-MCNC: 19 MG/DL (ref 7–30)
CALCIUM SERPL-MCNC: 9.4 MG/DL (ref 8.5–10.5)
CHLORIDE SERPL-SCNC: 104 MMOL/L (ref 98–107)
CHOLEST SERPL-MCNC: 121 MG/DL
CO2 SERPL-SCNC: 29 MMOL/L (ref 23–29)
CREAT SERPL-MCNC: 1.2 MG/DL (ref 0.7–1.3)
GFR SERPL CREATININE-BSD FRML MDRD: 62 ML/MIN/1.7M2
GLUCOSE SERPL-MCNC: 103 MG/DL (ref 70–105)
HDLC SERPL-MCNC: 35 MG/DL
LDLC SERPL CALC-MCNC: 72 MG/DL
NONHDLC SERPL-MCNC: 86 MG/DL
POTASSIUM SERPL-SCNC: 4.5 MMOL/L (ref 3.5–5.1)
SODIUM SERPL-SCNC: 140 MMOL/L (ref 136–145)
TRIGL SERPL-MCNC: 71 MG/DL

## 2017-04-12 PROCEDURE — 36415 COLL VENOUS BLD VENIPUNCTURE: CPT | Performed by: PHYSICIAN ASSISTANT

## 2017-04-12 PROCEDURE — 84460 ALANINE AMINO (ALT) (SGPT): CPT | Performed by: PHYSICIAN ASSISTANT

## 2017-04-12 PROCEDURE — 80061 LIPID PANEL: CPT | Performed by: PHYSICIAN ASSISTANT

## 2017-04-12 PROCEDURE — 80048 BASIC METABOLIC PNL TOTAL CA: CPT | Performed by: PHYSICIAN ASSISTANT

## 2017-04-12 PROCEDURE — 99214 OFFICE O/P EST MOD 30 MIN: CPT | Performed by: INTERNAL MEDICINE

## 2017-04-12 NOTE — LETTER
4/12/2017    FACUNDO SOLO  [facility]   [address]  [place] [address]    RE: Mark Butterfield       Dear Colleague,    I had the opportunity to see Mr. Mark Butterfield in Cardiology Clinic today at the [facility] in [place] for reevaluation of coronary artery disease following an acute ST elevation anterior wall myocardial infarction.        He initially presented to [facility] on 01/24/2017 with chest pain symptoms and EKG findings consistent with an anterior STEMI, but a normal troponin.  He went emergently to the Cardiac Catheterization Laboratory where he underwent angioplasty and stenting of the LAD using a drug-eluting stent.  He also underwent angioplasty of a fourth diagonal branch which was narrowed during the procedure.  He also had a focal 85% mid right-sided PDA stenosis.  He returned to the Cardiac Catheterization Lab 3 days later on 01/27/2017 and had that artery stented as well.      His troponin peaked at 130, indicating a significant myocardial infarction.  His echocardiogram in the hospital identified a mid to distal anterior, anteroseptal and apical region of hypokinesis with an ejection fraction of 40%-45% and mild aortic root dilation.      He was discharged on appropriate medications with outpatient cardiac rehab and has done very well since then.  He has not been experiencing any recurrent chest discomfort symptoms or shortness of breath.  He has occasional mild lightheadedness, especially with standing up quickly.  He has been exercising quite well in Cardiac Rehab with no limitations.      He had a followup echocardiogram done on 04/10/2017 which continues to show some mild left ventricular dysfunction with an ejection fraction of 45%-50% and residual hypokinesis within the mid to distal anteroseptal wall and apex.  His aortic root does not appear significantly dilated on the current study.  The measurement was 3.8  cm.      His cardiac risk factors seem to be primarily a family history and maybe some mild dyslipidemia.  His cholesterol numbers now are excellent.  His basic metabolic panel is normal as well.      PHYSICAL EXAMINATION:  Today, his blood pressure is 110/64, heart rate 66 and weight 250 pounds.  His lungs are clear.  Heart rhythm is regular.  He has no cardiac murmurs or carotid bruits.     Outpatient Encounter Prescriptions as of 4/12/2017   Medication Sig Dispense Refill     lisinopril (PRINIVIL/ZESTRIL) 10 MG tablet Take 0.5 tablets (5 mg) by mouth daily       aspirin 81 MG EC tablet Take 1 tablet (81 mg) by mouth daily 90 tablet 1     atorvastatin (LIPITOR) 80 MG tablet Take 1 tablet (80 mg) by mouth At Bedtime 90 tablet 1     carvedilol (COREG) 12.5 MG tablet Take 1 tablet (12.5 mg) by mouth 2 times daily (with meals) 180 tablet 1     nitroglycerin (NITROSTAT) 0.4 MG sublingual tablet Place 1 tablet (0.4 mg) under the tongue every 5 minutes as needed for chest pain (may repeat x 2) 25 tablet 1     ticagrelor (BRILINTA) 90 MG tablet Take 1 tablet (90 mg) by mouth every 12 hours To protect your stent(s).  Do not stop taking unless directed by cardiology. 180 tablet 1     [DISCONTINUED] hydrocortisone (ANUSOL-HC) 2.5 % cream Place rectally 3 times daily as needed       No facility-administered encounter medications on file as of 4/12/2017.       IMPRESSIONS:  Mr. Mark Butterfield is a 56-year-old gentleman who suffered an acute ST elevation anterior wall myocardial infarction in 01/2017.  He was treated emergently with angioplasty and stenting of the LAD and balloon angioplasty of a diagonal side branch.  He went on to have stenting of a PDA stenosis later in that hospitalization.  Fortunately, the results were all excellent and he has no residual significant coronary artery disease.  He is feeling very well without any recurrent or residual symptoms.  He continues to have mid to distal LAD regional wall motion  abnormality by echocardiogram with mildly reduced left ventricular systolic function.  Fortunately, I reassured him that this is a low-risk finding and that should not affect his lifestyle.  However, I will continue to be aggressive with his medications to try to give his left ventricular function every chance to recover.  He does have some occasional mild lightheadedness, especially in an orthostatic fashion, and I urged him to stay hydrated and move slowly to help minimize those symptoms.      I will plan to have him return in 6 months to revaluate his cardiac status and contact me in the meantime if he has any further problems.  I did suggest he continue the 40-pound lifting restriction.     Again, thank you for allowing me to participate in the care of your patient.      Sincerely,    LISA PARDO MD     [facility]

## 2017-04-12 NOTE — MR AVS SNAPSHOT
After Visit Summary   4/12/2017    Mark Butterfield    MRN: [id number]           Patient Information     Date Of Birth          1960        Visit Information        Provider Department      4/12/2017 1:45 PM Tomas Joe MD [facility] [place]        Today's Diagnoses     Coronary artery disease involving native coronary artery of native heart without angina pectoris    -  1    ST elevation myocardial infarction (STEMI), unspecified artery (H)        Dyslipidemia, goal LDL below 70           Follow-ups after your visit        Additional Services     Follow-Up with Cardiologist                 Your next 10 appointments already scheduled     Apr 13, 2017  4:30 PM CDT   Treatment 60 with  Cardiac Rehab 3   [facility] ([facility])    [address]  [facility] [address]   [phone]            Apr 18, 2017  2:00 PM CDT   Discharge Appointment with  CARDIAC REHAB 4   [facility] ([facility])    [address]  [facility] [address]   [phone]              Future tests that were ordered for you today     Open Future Orders        Priority Expected Expires Ordered    Follow-Up with Cardiologist Routine 10/9/2017 4/12/2018 4/12/2017            Who to contact     If you have questions or need follow up information about today's clinic visit or your schedule please contact [facility] [place] directly at [phone].  Normal or non-critical lab and imaging results will be communicated to you by MyChart, letter or phone within 4 business days after the clinic has received the results. If you do not hear from us within 7 days, please contact the clinic through MyChart or phone. If you have a critical or abnormal lab result, we will notify you by phone as soon as possible.  Submit refill requests through [employer] or call  "your pharmacy and they will forward the refill request to us. Please allow 3 business days for your refill to be completed.          Additional Information About Your Visit        MyChart Information     [place] [employer] lets you send messages to your doctor, view your test results, renew your prescriptions, schedule appointments and more. To sign up, go to [email or web address] . Click on \"Log in\" on the left side of the screen, which will take you to the Welcome page. Then click on \"Sign up Now\" on the right side of the page.     You will be asked to enter the access code listed below, as well as some personal information. Please follow the directions to create your username and password.     Your access code is: [email or web address]  Expires: 2017  2:23 PM     Your access code will  in 90 days. If you need help or a new code, please call your [place] clinic or [phone].        Care EveryWhere ID     This is your Care EveryWhere ID. This could be used by other organizations to access your [place] medical records  [id number]        Your Vitals Were     Pulse Height Pulse Oximetry BMI (Body Mass Index)          66 1.88 m (6' 2\") 99% 32.1 kg/m2         Blood Pressure from Last 3 Encounters:   17 110/64   17 (!) 82/60   17 148/85    Weight from Last 3 Encounters:   17 113.4 kg (250 lb)   17 112.2 kg (247 lb 6.4 oz)   17 112.2 kg (247 lb 6.4 oz)              We Performed the Following     Follow-Up with Cardiologist        Primary Care Provider Office Phone # Fax #    Chaz MADISON Contreras [phone]       [facility] [address] [address]        Thank you!     Thank you for choosing [facility] [place]  for your care. Our goal is always to provide you with excellent care. Hearing back from our patients is one way we can continue to improve our services. Please take a few minutes to complete the " written survey that you may receive in the mail after your visit with us. Thank you!             Your Updated Medication List - Protect others around you: Learn how to safely use, store and throw away your medicines at www.disposemymeds.org.          This list is accurate as of: 4/12/17  2:25 PM.  Always use your most recent med list.                   Brand Name Dispense Instructions for use    aspirin 81 MG EC tablet     90 tablet    Take 1 tablet (81 mg) by mouth daily       atorvastatin 80 MG tablet    LIPITOR    90 tablet    Take 1 tablet (80 mg) by mouth At Bedtime       carvedilol 12.5 MG tablet    COREG    180 tablet    Take 1 tablet (12.5 mg) by mouth 2 times daily (with meals)       lisinopril 10 MG tablet    PRINIVIL/ZESTRIL     Take 0.5 tablets (5 mg) by mouth daily       nitroglycerin 0.4 MG sublingual tablet    NITROSTAT    25 tablet    Place 1 tablet (0.4 mg) under the tongue every 5 minutes as needed for chest pain (may repeat x 2)       ticagrelor 90 MG tablet    BRILINTA    180 tablet    Take 1 tablet (90 mg) by mouth every 12 hours To protect your stent(s).  Do not stop taking unless directed by cardiology.

## 2017-04-12 NOTE — PROGRESS NOTES
HPI and Plan:   See dictation    Orders Placed This Encounter   Procedures     Follow-Up with Cardiologist       No orders of the defined types were placed in this encounter.      Medications Discontinued During This Encounter   Medication Reason     hydrocortisone (ANUSOL-HC) 2.5 % cream Therapy completed         Encounter Diagnoses   Name Primary?     ST elevation myocardial infarction (STEMI), unspecified artery (H)      Dyslipidemia, goal LDL below 70      Coronary artery disease involving native coronary artery of native heart without angina pectoris Yes       CURRENT MEDICATIONS:  Current Outpatient Prescriptions   Medication Sig Dispense Refill     lisinopril (PRINIVIL/ZESTRIL) 10 MG tablet Take 0.5 tablets (5 mg) by mouth daily       aspirin 81 MG EC tablet Take 1 tablet (81 mg) by mouth daily 90 tablet 1     atorvastatin (LIPITOR) 80 MG tablet Take 1 tablet (80 mg) by mouth At Bedtime 90 tablet 1     carvedilol (COREG) 12.5 MG tablet Take 1 tablet (12.5 mg) by mouth 2 times daily (with meals) 180 tablet 1     nitroglycerin (NITROSTAT) 0.4 MG sublingual tablet Place 1 tablet (0.4 mg) under the tongue every 5 minutes as needed for chest pain (may repeat x 2) 25 tablet 1     ticagrelor (BRILINTA) 90 MG tablet Take 1 tablet (90 mg) by mouth every 12 hours To protect your stent(s).  Do not stop taking unless directed by cardiology. 180 tablet 1       ALLERGIES   No Known Allergies    PAST MEDICAL HISTORY:  Past Medical History:   Diagnosis Date     Benign essential HTN 1/27/2017     Coronary artery disease involving native coronary artery of native heart without angina pectoris 1/31/2017     Dyslipidemia, goal LDL below 70 1/27/2017     Ischemic cardiomyopathy 1/27/2017     ST elevation (STEMI) myocardial infarction involving left anterior descending coronary artery (H) 1/24/2017       PAST SURGICAL HISTORY:  Past Surgical History:   Procedure Laterality Date     HEART CATH LEFT HEART CATH  01/24/2017    LIZY to  "mid LAD     HEART CATH LEFT HEART CATH  01/27/2017    LIZY to mid rPDA       FAMILY HISTORY:  Family History   Problem Relation Age of Onset     Coronary Artery Disease Mother      Coronary Artery Disease Father        SOCIAL HISTORY:  Social History     Social History     Marital status:      Spouse name: N/A     Number of children: N/A     Years of education: N/A     Social History Main Topics     Smoking status: Never Smoker     Smokeless tobacco: None     Alcohol use No     Drug use: No     Sexual activity: Not Asked     Other Topics Concern     Parent/Sibling W/ Cabg, Mi Or Angioplasty Before 65f 55m? No     Social History Narrative       Review of Systems:  Skin:  Negative       Eyes:  Negative      ENT:  Negative postnasal drainage    Respiratory:  Negative       Cardiovascular:    Positive for;lightheadedness;dizziness    Gastroenterology: Negative      Genitourinary:  Negative      Musculoskeletal:  Negative      Neurologic:  Negative      Psychiatric:  Negative      Heme/Lymph/Imm:  Negative      Endocrine:  Negative        Physical Exam:  Vitals: /64  Pulse 66  Ht 1.88 m (6' 2\")  Wt 113.4 kg (250 lb)  SpO2 99%  BMI 32.1 kg/m2    Constitutional:  cooperative, alert and oriented, well developed, well nourished, in no acute distress        Skin:  warm and dry to the touch, no apparent skin lesions or masses noted        Head:  normocephalic, no masses or lesions        Eyes:  pupils equal and round, conjunctivae and lids unremarkable, sclera white, no xanthalasma, EOMS intact, no nystagmus        ENT:  no pallor or cyanosis, dentition good        Neck:  carotid pulses are full and equal bilaterally, JVP normal, no carotid bruit, no thyromegaly        Chest:  normal breath sounds, clear to auscultation, normal A-P diameter, normal symmetry, normal respiratory excursion, no use of accessory muscles          Cardiac: regular rhythm, normal S1/S2, no S3 or S4, apical impulse not displaced, no " murmurs, gallops or rubs                  Abdomen:  abdomen soft;BS normoactive        Vascular: pulses full and equal, no bruits auscultated                                        Extremities and Back:  no deformities, clubbing, cyanosis, erythema observed;no edema              Neurological:  affect appropriate, oriented to time, person and place;no gross motor deficits              CC  Maria Victoria Shields PA-C  [facility] [place]  [address]  [place] [address]

## 2017-04-13 ENCOUNTER — HOSPITAL ENCOUNTER (OUTPATIENT)
Dept: CARDIAC REHAB | Facility: CLINIC | Age: 57
End: 2017-04-13
Attending: PHYSICIAN ASSISTANT
Payer: COMMERCIAL

## 2017-04-13 PROCEDURE — 93798 PHYS/QHP OP CAR RHAB W/ECG: CPT | Performed by: OCCUPATIONAL THERAPIST

## 2017-04-13 PROCEDURE — 40000116 ZZH STATISTIC OP CR VISIT: Performed by: OCCUPATIONAL THERAPIST

## 2017-04-13 NOTE — PROGRESS NOTES
HISTORY OF PRESENT ILLNESS:  I had the opportunity to see Mr. Mark Butterfield in Cardiology Clinic today at the [facility] in [place] for reevaluation of coronary artery disease following an acute ST elevation anterior wall myocardial infarction.        He initially presented to [facility] on 01/24/2017 with chest pain symptoms and EKG findings consistent with an anterior STEMI, but a normal troponin.  He went emergently to the Cardiac Catheterization Laboratory where he underwent angioplasty and stenting of the LAD using a drug-eluting stent.  He also underwent angioplasty of a fourth diagonal branch which was narrowed during the procedure.  He also had a focal 85% mid right-sided PDA stenosis.  He returned to the Cardiac Catheterization Lab 3 days later on 01/27/2017 and had that artery stented as well.      His troponin peaked at 130, indicating a significant myocardial infarction.  His echocardiogram in the hospital identified a mid to distal anterior, anteroseptal and apical region of hypokinesis with an ejection fraction of 40%-45% and mild aortic root dilation.      He was discharged on appropriate medications with outpatient cardiac rehab and has done very well since then.  He has not been experiencing any recurrent chest discomfort symptoms or shortness of breath.  He has occasional mild lightheadedness, especially with standing up quickly.  He has been exercising quite well in Cardiac Rehab with no limitations.      He had a followup echocardiogram done on 04/10/2017 which continues to show some mild left ventricular dysfunction with an ejection fraction of 45%-50% and residual hypokinesis within the mid to distal anteroseptal wall and apex.  His aortic root does not appear significantly dilated on the current study.  The measurement was 3.8 cm.      His cardiac risk factors seem to be primarily a family history and maybe some mild dyslipidemia.  His cholesterol numbers  now are excellent.  His basic metabolic panel is normal as well.      PHYSICAL EXAMINATION:  Today, his blood pressure is 110/64, heart rate 66 and weight 250 pounds.  His lungs are clear.  Heart rhythm is regular.  He has no cardiac murmurs or carotid bruits.      IMPRESSIONS:  Mr. Marian Butterfield is a 56-year-old gentleman who suffered an acute ST elevation anterior wall myocardial infarction in 2017.  He was treated emergently with angioplasty and stenting of the LAD and balloon angioplasty of a diagonal side branch.  He went on to have stenting of a PDA stenosis later in that hospitalization.  Fortunately, the results were all excellent and he has no residual significant coronary artery disease.  He is feeling very well without any recurrent or residual symptoms.  He continues to have mid to distal LAD regional wall motion abnormality by echocardiogram with mildly reduced left ventricular systolic function.  Fortunately, I reassured him that this is a low-risk finding and that should not affect his lifestyle.  However, I will continue to be aggressive with his medications to try to give his left ventricular function every chance to recover.  He does have some occasional mild lightheadedness, especially in an orthostatic fashion, and I urged him to stay hydrated and move slowly to help minimize those symptoms.      I will plan to have him return in 6 months to revaluate his cardiac status and contact me in the meantime if he has any further problems.  I did suggest he continue the 40-pound lifting restriction.      cc:      Chaz Contreras MD    [facility]   [address] [place] [address]         LISA PARDO MD, FACC             D: 2017 17:43   T: 2017 15:50   MT: [id number]      Name:     MARIAN BUTTERFIELD   MRN:      [id number]        Account:      [id number]   :      1960           Service Date: 2017      Document: [id number]

## 2017-04-18 ENCOUNTER — HOSPITAL ENCOUNTER (OUTPATIENT)
Dept: CARDIAC REHAB | Facility: CLINIC | Age: 57
End: 2017-04-18
Attending: PHYSICIAN ASSISTANT
Payer: COMMERCIAL

## 2017-04-18 VITALS — BODY MASS INDEX: 31.44 KG/M2 | WEIGHT: 245 LBS | HEIGHT: 74 IN

## 2017-04-18 PROCEDURE — 93798 PHYS/QHP OP CAR RHAB W/ECG: CPT | Performed by: OCCUPATIONAL THERAPIST

## 2017-04-18 PROCEDURE — 40000575 ZZH STATISTIC OP CARDIAC VISIT #2: Performed by: OCCUPATIONAL THERAPIST

## 2017-04-18 PROCEDURE — 40000116 ZZH STATISTIC OP CR VISIT: Performed by: OCCUPATIONAL THERAPIST

## 2017-04-18 PROCEDURE — 93797 PHYS/QHP OP CAR RHAB WO ECG: CPT | Performed by: OCCUPATIONAL THERAPIST

## 2017-04-18 ASSESSMENT — 6 MINUTE WALK TEST (6MWT)
GENDER SELECTION: MALE
TOTAL DISTANCE WALKED (FT): 1810
MALE CALC: 2073.04
PREDICTED: 2085.68
FEMALE CALC: 1571.77
TOTAL DISTANCE WALKED (FT): 1665

## 2017-04-18 NOTE — PROGRESS NOTES
Mark Butterfield  56 year old  1960  STEMI stent   04/18/17 1400   Session   Session Discharge Note   Certified through this date 05/13/17   Cardiac Rehab Assessment   Cardiac Rehab Assessment Patient was admitted on 1/24/2017 with severe chest pain while moving furniture with his brother and found to have a STEMI.  He went emergently to the cath lab and was found to have an occluded mid LAD.  LIZY placed to large mid LAD, POBA to ostial D4 due to jailing.  He did well post procedure.  After discussion, it was elected to complete his revascularization with staged PCI to the 85% stenosed mid rPDA with LIZY placed 1/27.  At that procedure he LAD stent was noted to be patent and diagonal jailed.  Patient reports that he has been feeling great since his stents were placed, denies any anginal symptoms.  He has already made significant changes to his diet and has started walking with his wife at the mall.   1/31 Patient has attended only the EVAL sessions when MD present to sign ITPs. 2/9/17 Met with patient during exercise for update/ ITP. Patient is on 6th exercise session and is making good progress so far. He currently tolerates 5.4 METS and is pleased with his progress.  Patient will try elliptical next exercise session. He is working towards weight loss and achieved a weight loss of 8# so far. Patient continues to benefit from skilled therapy to monitor CV response to exercise, to educate on risk management and behavior change.  2/16 Pt was seen for a short private consult.  Content was primarily education regarding his EF and recovery process, impact on home and work duties, potential for improvement possibilities.  Pt is frustrated with the limitations and yet has some anxiety about how his progress will actually go.  3/7 Patient has been progressing well in rehab.  He has reached his initial MET level goal and goal was amended.  Patient recently had his lisinopril decreased.  Patients BP remains low at  times but he denies any lightheadedness.  Faxed his BP log over to [facility] heart last session for their review.  Patients weight has fluctuated but overall he reports having lost 12lbs since his MI.  Patient continues to benefit from skilled intervention in order to monitor BPs, for continued weight loss and increased exercise intensities with new interval training.  3/28 Patient continues to progress very well in rehab.  Patient continues to benefit from skilled intervention for monitored progressive exercise.with normal CV response.04/18/2017 PT has made fabulous progress in rehab.  PT has lost weight is regularily exercising and eating way more healthy than he had.  PT was given discharge instructions and states understanding of information received.  Pt made significant gains in exercise tolerance. Initially patient tolerated6 minutes at 3.4 METs, now tolerating 30-40minutes at 8.7 METs. Patient also increased 6-minute walk test by 9% The PT was given instructions on frequency, intensity, and duration for continued exercise as well as muscle conditioning and stretching exercises.  Your PT plans to continue to exercise up to 5 days per week 30 min duration and continue to work on weight loss and continue to follow low fat diet.       General Information   Treatment Diagnosis STEMI   Date of Treatment Diagnosis 01/24/17   Secondary Treatment Diagnosis Stent   Significant Past CV History None   Comorbidities None   Lead up symptoms chest pressure, SOB, diaphoresis,    Hospital Location [facility]   Hospital Discharge Date 01/27/17   Signs and Symptoms Post Hospital Discharge None   Outpatient Cardiac Rehab Start Date 01/31/17   Primary Physician Dr. Chaz Contreras  ([phone])   Cardiologist Dr. Joe   Cardiologist Follow Up Scheduled   Ejection Fraction 50%   Risk Stratification Moderate   Living and Work Status    Living Arrangements and Social Status house   Support System Live with an adult   Return to Employment Yes  "  Preventative Medications   CMS recommended medications Ace inhibitors;Antiplatelets;Beta Blocker;Lipid Lowering   Falls Screen   Have you fallen two or more times in the past year? No   Have you fallen and had an injury in the past year? No   Pain   Patient Currently in Pain No   Physical Assessments   Incisions Not assessed   Edema Not assessed   Right Lung Sounds not assessed   Left Lung Sounds not assessed   Limitations No limitations   Individualized Treatment Plan   Monitored Sessions Scheduled 24   Monitored Sessions Attended 33   Oxygen   Supplemental Oxygen needed No   Nutrition Management - Weight Management   Assessment Re-assessment   Age 56   Weight 111.1 kg (245 lb)   Height 1.88 m (6' 2.02\")   BMI (Calculated) 31.51   Initial Rate Your Plate Score. Dietary tool to assess eating patterns. Scores range from 24 to 72. The higher the score the healthier the eating pattern. 55   Weight Management Comments 2/9/17 Patient is down 8# since starting rehab. Patient had been drinking 5-6 pepsis per day. He now drinks <1. Patient is also following the mediterranean diet cloestly. 4/18/2017 PT made signifficant changes to his diet and he lost up to 15 pounds since he started rehab.     Nutrition Management - Lipids   Lipids Labs Available   Date 02/03/17   Total Cholesterol 143   Triglycerides 96   HDL 31   LDL 93   Prescribed Lipid Medication Yes   Statin Intensity Intensity Not Indicated   Nutrition Management - Diabetes   Diabetes No   Nutrition Management Summary   Dietary Recommendations Mediterranean   Stages of Change for Diet Compliance Action   Interventions Planned Instruct on Label Reading;Educate on Weight Management Principles;Educate on Benefits of Exercise   Interventions In Progress or Completed Understands Weight Management Principles;Educated on Benefits of Exercise   Nutrition Summary Comments 2/9/17 Patient is following mediterranean diet closely.  He reduced his sodium intake to 2,000mg " daily. He had been drinking 5-6 pepsis per day and is now down to <1.4/18/2017 PT continues to follow a low fat low cholesterol and low sodium diet.     Psychosocial Management   Psychosocial Assessment Re-assessment   Is there history of clinical depression or increased risk of depression? No previous history   Current Level of Stress per Patient Report Mild   Initial Patient Health Questionnaire -9 Score (PHQ-9) for depression. 5-9 Minimal symptoms, 10-14 Minor depression, 15-19 Major depression, moderately severe, > 20 Major depression, severe  6   Discharge PHQ-9 Score for Depression 0   Initial [facility] [place] Survey score.  Quality of Life:   If total score > 25 review individual areas where patient rated a 4 or 5.  Consider patients current medical condition and what role that plays on the score.   Adjust treatment protocol to improve areas of concern.  Consider the following:  PHQ9 score, DASI, and re-assessment within the next 30 days to assist with developing treatments.  18   [facility] Score 10   Stages of Change Maintenance   Interventions Planned Patient to verbalize understanding of negative impact of stress to personal health   Interventions In Progress or Completed Patient verbalizes understanding of negative impact of stress to personal health   Psychosocial Comments 2/9/17 No change with stress/ stress management.  2/16 Pt does admit to some anxiety regarding whether his EF will return to normal.   Other Core Components - Hypertension   History of or Diagnosis of Hypertension No   Currently taking Anti-Hypertensives Yes;Beta blocker;Ace Inhibitor   Hypertension Comments 2/9/17 BP has been low several times after exercise. BP medication has been adjusted and patient now denies sx of dizziness/ lightheadedness.    Other Core Components - Tobacco   History of Tobacco Use Never   Other Core Components Summary   Interventions Planned None   Activity/Exercise History    Activity/Exercise Assessment Re-assessment   Activity/Exercise Status prior to event? Was Physically Active   Number of Days Currently participating in Moderate Physical Activity? 5   Number of Days Currently performing  Aerobic Exercise (including rehab)? 5   Number of Minutes per Session Currently of Aerobic Exercise (average)? 30-40   Current Stage of Change (Physical Activity) Preparation   Current Stage of Change (Aerobic Exercise) Action   Patient Goals Goal #1   Goal #1 Description Increase exercise intensity to 6-7 METs inorder to return to activities such as chopping wood and portaging a canoe.  Goal amended to 7-8 METs   Goal #1 Target Date 04/28/17   Goal #1 Date Met 04/18/17   Goal #1 Progress Towards Goal 2/9/17 Patient is making good progress in rehab. He currently tolerates a MET level of 5.4 and is pleased with his progress.  He is progressing nicely to achieve this goal.  2/16 Pt wanted to discuss what the implications would be if his EF does not return to normal.  Reviewed some of the criteria, and what limits would be, but also tried to encourage Pt that he has a good potential for recovery.    3/7 Patient is currently tolerating 6-7 METs.  Will increase his MET level goal to 7-8 METs.  Will start interval training on the treadmill and progress to intervals on the elliptical.    3/28 Patient has reached 8.4 METs on the TM while doing intervals at 1 min.  WIll increase the intervals to 2 min next session.  Patient will be having an echo in 1-2 weeks to reassess EF.  Patient will discharge in the next 2-3 weeks.  CV response continue to be wnls with exercise.4/18/2017 PT reports his recent echo shows his EF is 50%. pT continues to exercise a total of 5 days per week 30-40 min duration .  4/18/2017 PT met above goal he is currently exercising at 8.7 mets with stable CV response.    Activity/Exercise Target Outcome An Accumulation of 150  Minutes of Aerobic Activity per Week   Exercise Assessment    6 Minute Walk Predicted - Gender Selection Male   6 Minute Walk Predicted (Male) 2073.04   6 Minute Walk Predicted (Female) 1571.77   Initial 6 Minute Walk Distance (Feet) 1665 ft   Discharge 6 Minute Walk Distance (Feet) 1810   Resting HR 61 bpm   Exercise HR 75 bpm   Post Exercise HR 65 bpm   Resting /70   Exercise /64   Post Exercise /70   Current MET Level 8.7   MET Level Goal 8.7   ECG Rhythm Sinus rhythm   Ectopy None   Current Symptoms Denies symptoms   Limitations/Restrictions None   Exercise Prescription   Mode Treadmill;Upright bike;Weights   Duration/Time 30-45 min   Frequency 3 daysweek   THR (85% of age predicted max HR) 138.55   OMNI Effort Rating (0-10 Scale) 4-6/10   Progression Continuous bouts;Total exercise time of 30-45 minutes;Aerobic exercise to OMNI rating of 6 or below and at or below THR;Progress peak intensity by 1/2 MET per week   Recommended Home Exercise   Type of Exercise Walking   Frequency (days per week) 2-3   Duration (minutes per session) 30-45 min   Effort Rating Recommended 4-6/10   Current Home Exercise   Type of Exercise Walking   Frequency (days per week) 1-2   Duration (minutes per session) 20-25   Follow-up/On-going Support   Provider follow-up needed on the following No follow-up needed   Learning Assessment   Learner Patient   Primary Language English   Preferred Learning Style Listening;Demonstration   Barriers to Learning No barriers noted   Patient Education   Education recommended Nutrition;Exercise Principles   Education classes attended (short private consult on 2/16/2017)

## 2017-08-09 DIAGNOSIS — I21.3 ST ELEVATION MYOCARDIAL INFARCTION (STEMI), UNSPECIFIED ARTERY (H): ICD-10-CM

## 2017-08-11 DIAGNOSIS — I21.3 ST ELEVATION MYOCARDIAL INFARCTION (STEMI), UNSPECIFIED ARTERY (H): ICD-10-CM

## 2017-10-19 DIAGNOSIS — I25.5 ISCHEMIC CARDIOMYOPATHY: ICD-10-CM

## 2017-10-19 DIAGNOSIS — E78.5 DYSLIPIDEMIA, GOAL LDL BELOW 70: ICD-10-CM

## 2017-10-19 DIAGNOSIS — I10 BENIGN ESSENTIAL HTN: ICD-10-CM

## 2017-10-19 RX ORDER — ATORVASTATIN CALCIUM 80 MG/1
80 TABLET, FILM COATED ORAL AT BEDTIME
Qty: 90 TABLET | Refills: 0 | Status: SHIPPED | OUTPATIENT
Start: 2017-10-19 | End: 2018-01-18

## 2017-10-19 RX ORDER — CARVEDILOL 12.5 MG/1
12.5 TABLET ORAL 2 TIMES DAILY WITH MEALS
Qty: 180 TABLET | Refills: 0 | Status: SHIPPED | OUTPATIENT
Start: 2017-10-19 | End: 2018-01-18

## 2017-10-19 NOTE — TELEPHONE ENCOUNTER
Refill of Coreg 12.5 mg bid and Atorvastatin 80 mg daily for 90 days, no refills. Will see Dr. Joe on 11/14/17 to reassess.  Will place labs for upcoming OV.

## 2017-11-14 ENCOUNTER — OFFICE VISIT (OUTPATIENT)
Dept: CARDIOLOGY | Facility: CLINIC | Age: 57
End: 2017-11-14
Attending: INTERNAL MEDICINE
Payer: COMMERCIAL

## 2017-11-14 VITALS
BODY MASS INDEX: 31.38 KG/M2 | DIASTOLIC BLOOD PRESSURE: 72 MMHG | SYSTOLIC BLOOD PRESSURE: 113 MMHG | WEIGHT: 244.5 LBS | HEIGHT: 74 IN | HEART RATE: 55 BPM

## 2017-11-14 DIAGNOSIS — E78.5 DYSLIPIDEMIA, GOAL LDL BELOW 70: ICD-10-CM

## 2017-11-14 DIAGNOSIS — I25.10 CORONARY ARTERY DISEASE INVOLVING NATIVE CORONARY ARTERY OF NATIVE HEART WITHOUT ANGINA PECTORIS: Primary | ICD-10-CM

## 2017-11-14 DIAGNOSIS — I25.5 ISCHEMIC CARDIOMYOPATHY: ICD-10-CM

## 2017-11-14 DIAGNOSIS — I10 BENIGN ESSENTIAL HTN: ICD-10-CM

## 2017-11-14 DIAGNOSIS — I21.02 ST ELEVATION (STEMI) MYOCARDIAL INFARCTION INVOLVING LEFT ANTERIOR DESCENDING CORONARY ARTERY (H): ICD-10-CM

## 2017-11-14 LAB
ALT SERPL W P-5'-P-CCNC: 50 U/L (ref 5–30)
ANION GAP SERPL CALCULATED.3IONS-SCNC: 10.5 MMOL/L (ref 6–17)
BUN SERPL-MCNC: 18 MG/DL (ref 7–30)
CALCIUM SERPL-MCNC: 9.7 MG/DL (ref 8.5–10.5)
CHLORIDE SERPL-SCNC: 103 MMOL/L (ref 98–107)
CHOLEST SERPL-MCNC: 128 MG/DL
CO2 SERPL-SCNC: 31 MMOL/L (ref 23–29)
CREAT SERPL-MCNC: 1.27 MG/DL (ref 0.7–1.3)
GFR SERPL CREATININE-BSD FRML MDRD: 58 ML/MIN/1.7M2
GLUCOSE SERPL-MCNC: 95 MG/DL (ref 70–105)
HDLC SERPL-MCNC: 40 MG/DL
LDLC SERPL CALC-MCNC: 75 MG/DL
NONHDLC SERPL-MCNC: 88 MG/DL
POTASSIUM SERPL-SCNC: 4.5 MMOL/L (ref 3.5–5.1)
SODIUM SERPL-SCNC: 140 MMOL/L (ref 136–145)
TRIGL SERPL-MCNC: 64 MG/DL

## 2017-11-14 PROCEDURE — 80061 LIPID PANEL: CPT | Performed by: INTERNAL MEDICINE

## 2017-11-14 PROCEDURE — 99214 OFFICE O/P EST MOD 30 MIN: CPT | Performed by: INTERNAL MEDICINE

## 2017-11-14 PROCEDURE — 84460 ALANINE AMINO (ALT) (SGPT): CPT | Performed by: INTERNAL MEDICINE

## 2017-11-14 PROCEDURE — 36415 COLL VENOUS BLD VENIPUNCTURE: CPT | Performed by: INTERNAL MEDICINE

## 2017-11-14 PROCEDURE — 80048 BASIC METABOLIC PNL TOTAL CA: CPT | Performed by: INTERNAL MEDICINE

## 2017-11-14 NOTE — MR AVS SNAPSHOT
After Visit Summary   11/14/2017    Mark Butterfield    MRN: [id number]           Patient Information     Date Of Birth          1960        Visit Information        Provider Department      11/14/2017 3:15 PM Tomas Joe MD [facility]        Today's Diagnoses     Coronary artery disease involving native coronary artery of native heart without angina pectoris    -  1    ST elevation (STEMI) myocardial infarction involving left anterior descending coronary artery (H)        Dyslipidemia, goal LDL below 70        Ischemic cardiomyopathy        Benign essential HTN           Follow-ups after your visit        Additional Services     Follow-Up with Cardiologist                 Future tests that were ordered for you today     Open Future Orders        Priority Expected Expires Ordered    Exercise Stress Echocardiogram Routine 5/13/2018 11/14/2018 11/14/2017    Follow-Up with Cardiologist Routine 5/13/2018 11/14/2018 11/14/2017    Lipid Profile Routine 5/13/2018 11/14/2018 11/14/2017    ALT Routine 5/13/2018 11/14/2018 11/14/2017    Basic metabolic panel Routine 5/13/2018 11/14/2018 11/14/2017    Echocardiogram Routine 12/14/2017 11/14/2018 11/14/2017            Who to contact     If you have questions or need follow up information about today's clinic visit or your schedule please contact [facility] directly at [phone].  Normal or non-critical lab and imaging results will be communicated to you by MyChart, letter or phone within 4 business days after the clinic has received the results. If you do not hear from us within 7 days, please contact the clinic through MyChart or phone. If you have a critical or abnormal lab result, we will notify you by phone as soon as possible.  Submit refill requests through [employer] or call your pharmacy and they will forward the refill request to us. Please allow 3 business days for  "your refill to be completed.          Additional Information About Your Visit        [employer] Information     GetThis lets you send messages to your doctor, view your test results, renew your prescriptions, schedule appointments and more. To sign up, go to [email or web address] . Click on \"Log in\" on the left side of the screen, which will take you to the Welcome page. Then click on \"Sign up Now\" on the right side of the page.     You will be asked to enter the access code listed below, as well as some personal information. Please follow the directions to create your username and password.     Your access code is: [email or web address]  Expires: 2018  3:42 PM     Your access code will  in 90 days. If you need help or a new code, please call your [facility] clinic or [phone].        Care EveryWhere ID     This is your Care EveryWhere ID. This could be used by other organizations to access your [facility] medical records  [id number]        Your Vitals Were     Pulse Height BMI (Body Mass Index)             55 1.88 m (6' 2\") 31.39 kg/m2          Blood Pressure from Last 3 Encounters:   17 113/72   17 110/64   17 (!) 82/60    Weight from Last 3 Encounters:   17 110.9 kg (244 lb 8 oz)   17 111.1 kg (245 lb)   17 113.4 kg (250 lb)              We Performed the Following     Follow-Up with Cardiologist        Primary Care Provider Office Phone # Fax #    Chaz Contreras [phone]       [facility] [address] [place] [id number]        Equal Access to Services     DENA HERR : Hadii agapito Flores, waaxda luqadaha, qaybta kaalmada adeegyada, tamie sigala. So [facility] [phone].    ATENCIÓN: Si habla español, tiene a welsh disposición servicios gratuitos de asistencia lingüística. Llame al [phone].    We comply with applicable federal civil rights laws and [place] laws. We do not discriminate " on the basis of race, color, national origin, age, disability, sex, sexual orientation, or gender identity.            Thank you!     Thank you for choosing [facility]  for your care. Our goal is always to provide you with excellent care. Hearing back from our patients is one way we can continue to improve our services. Please take a few minutes to complete the written survey that you may receive in the mail after your visit with us. Thank you!             Your Updated Medication List - Protect others around you: Learn how to safely use, store and throw away your medicines at www.disposemymeds.org.          This list is accurate as of: 11/14/17  3:42 PM.  Always use your most recent med list.                   Brand Name Dispense Instructions for use Diagnosis    aspirin 81 MG EC tablet     90 tablet    Take 1 tablet (81 mg) by mouth daily    ST elevation myocardial infarction (STEMI), unspecified artery (H)       atorvastatin 80 MG tablet    LIPITOR    90 tablet    Take 1 tablet (80 mg) by mouth At Bedtime    Dyslipidemia, goal LDL below 70       carvedilol 12.5 MG tablet    COREG    180 tablet    Take 1 tablet (12.5 mg) by mouth 2 times daily (with meals)    Ischemic cardiomyopathy, Benign essential HTN       lisinopril 10 MG tablet    PRINIVIL/ZESTRIL     Take 0.5 tablets (5 mg) by mouth daily    Ischemic cardiomyopathy, ST elevation myocardial infarction (STEMI), unspecified artery (H), Benign essential HTN       nitroGLYcerin 0.4 MG sublingual tablet    NITROSTAT    25 tablet    Place 1 tablet (0.4 mg) under the tongue every 5 minutes as needed for chest pain (may repeat x 2)    ST elevation myocardial infarction (STEMI), unspecified artery (H)       ticagrelor 90 MG tablet    BRILINTA    180 tablet    Take 1 tablet (90 mg) by mouth every 12 hours To protect your stent(s).  Do not stop taking unless directed by cardiology.    ST elevation myocardial infarction (STEMI),  unspecified artery (H)

## 2017-11-14 NOTE — PROGRESS NOTES
HPI and Plan:   See dictation    Orders Placed This Encounter   Procedures     Lipid Profile     ALT     Basic metabolic panel     Follow-Up with Cardiologist     Echocardiogram     Exercise Stress Echocardiogram       No orders of the defined types were placed in this encounter.      There are no discontinued medications.      Encounter Diagnoses   Name Primary?     Coronary artery disease involving native coronary artery of native heart without angina pectoris Yes     ST elevation (STEMI) myocardial infarction involving left anterior descending coronary artery (H)      Dyslipidemia, goal LDL below 70      Ischemic cardiomyopathy      Benign essential HTN        CURRENT MEDICATIONS:  Current Outpatient Prescriptions   Medication Sig Dispense Refill     atorvastatin (LIPITOR) 80 MG tablet Take 1 tablet (80 mg) by mouth At Bedtime 90 tablet 0     carvedilol (COREG) 12.5 MG tablet Take 1 tablet (12.5 mg) by mouth 2 times daily (with meals) 180 tablet 0     ticagrelor (BRILINTA) 90 MG tablet Take 1 tablet (90 mg) by mouth every 12 hours To protect your stent(s).  Do not stop taking unless directed by cardiology. 180 tablet 1     aspirin 81 MG EC tablet Take 1 tablet (81 mg) by mouth daily 90 tablet 2     lisinopril (PRINIVIL/ZESTRIL) 10 MG tablet Take 0.5 tablets (5 mg) by mouth daily       nitroglycerin (NITROSTAT) 0.4 MG sublingual tablet Place 1 tablet (0.4 mg) under the tongue every 5 minutes as needed for chest pain (may repeat x 2) 25 tablet 1       ALLERGIES     Allergies   Allergen Reactions     Penicillins      nausea       PAST MEDICAL HISTORY:  Past Medical History:   Diagnosis Date     Benign essential HTN 1/27/2017     Coronary artery disease involving native coronary artery of native heart without angina pectoris 1/31/2017     Dyslipidemia, goal LDL below 70 1/27/2017     Ischemic cardiomyopathy 1/27/2017     ST elevation (STEMI) myocardial infarction involving left anterior descending coronary artery (H)  "1/24/2017       PAST SURGICAL HISTORY:  Past Surgical History:   Procedure Laterality Date     HEART CATH LEFT HEART CATH  01/24/2017    LIZY to mid LAD     HEART CATH LEFT HEART CATH  01/27/2017    LIZY to mid rPDA       FAMILY HISTORY:  Family History   Problem Relation Age of Onset     Coronary Artery Disease Mother      Coronary Artery Disease Father        SOCIAL HISTORY:  Social History     Social History     Marital status:      Spouse name: N/A     Number of children: N/A     Years of education: N/A     Social History Main Topics     Smoking status: Never Smoker     Smokeless tobacco: Never Used     Alcohol use No     Drug use: No     Sexual activity: Not Asked     Other Topics Concern     Parent/Sibling W/ Cabg, Mi Or Angioplasty Before 65f 55m? No     Social History Narrative       Review of Systems:  Skin:  Negative       Eyes:  Negative      ENT:         Respiratory:  Negative       Cardiovascular:  Negative;palpitations;chest pain;syncope or near-syncope;cyanosis;fatigue;exercise intolerance;edema Positive for lightheadedness with quick movements  Gastroenterology: Negative      Genitourinary:  not assessed      Musculoskeletal:  Negative      Neurologic:  Negative      Psychiatric:  Negative      Heme/Lymph/Imm:  Negative      Endocrine:  Negative        Physical Exam:  Vitals: /72 (BP Location: Left arm, Cuff Size: Adult Large)  Pulse 55  Ht 1.88 m (6' 2\")  Wt 110.9 kg (244 lb 8 oz)  BMI 31.39 kg/m2    Constitutional:  cooperative, alert and oriented, well developed, well nourished, in no acute distress        Skin:  warm and dry to the touch, no apparent skin lesions or masses noted          Head:  normocephalic, no masses or lesions        Eyes:  pupils equal and round, conjunctivae and lids unremarkable, sclera white, no xanthalasma, EOMS intact, no nystagmus        Lymph:      ENT:  no pallor or cyanosis, dentition good        Neck:  carotid pulses are full and equal bilaterally, " JVP normal, no carotid bruit        Respiratory:  normal breath sounds, clear to auscultation, normal A-P diameter, normal symmetry, normal respiratory excursion, no use of accessory muscles         Cardiac: regular rhythm, normal S1/S2, no S3 or S4, apical impulse not displaced, no murmurs, gallops or rubs                pulses full and equal, no bruits auscultated                                        GI:  abdomen soft;BS normoactive        Extremities and Muscular Skeletal:  no deformities, clubbing, cyanosis, erythema observed;no edema              Neurological:  no gross motor deficits;affect appropriate        Psych:  affect appropriate, oriented to time, person and place        CC  Tomas Joe MD  [address]  [place] [address]

## 2017-11-14 NOTE — PROGRESS NOTES
HISTORY OF PRESENT ILLNESS:  I had the opportunity to see Mr. Mark Butterfield in Cardiology Clinic today at the [facility] in [place] for reevaluation of coronary artery disease following an acute anterior ST elevation myocardial infarction in 01/2017.  Mr. Butterfield presented on 01/24/2017 with chest pain symptoms and anterior ST elevation and went emergently to the Cardiac Catheterization Laboratory.  He underwent angioplasty and stenting of the LAD with a drug-eluting stent.  He also had angioplasty of a diagonal branch artery which was narrowed during that procedure.        He returned to the Cath Lab 3 days later for staged stenting procedure of his mid right-sided PDA which had an 85% stenosis.  His troponin peaked at 130, which is certainly higher than I would have expected it, given how quickly he came in to the hospital.  Initially he has mild to moderate left ventricular dysfunction with an ejection fraction of 40%-45%.  His followup echocardiogram on 04/10/2017 showed persistent mild left ventricular systolic dysfunction with an ejection fraction of 45%-50%.  He had no significant valvular disease and borderline aortic root enlargement.      Since I saw him in April, he has been doing very well.  He has been active without any chest pain symptoms or shortness of breath.  He has no palpitations, lightheadedness or syncope issues.  He seems to be tolerating his medications well.  His cholesterol numbers are good, although not perfect.  His LDL is stalled at 75, HDL 40, total cholesterol is 128, triglycerides 64.      PHYSICAL EXAMINATION:  His blood pressure is 113/72, heart rate 55 and weight 244 pounds.  His lungs are clear.  His heart rhythm is regular.  He has no cardiac murmurs or carotid bruits.      IMPRESSIONS:  Mr. Mark Butterfield is a 57-year-old gentleman with a history of anterior wall infarction in 01/2017, treated emergently with stenting of his LAD and angioplasty of the  diagonal branch.  He then had stenting of the PDA 3 days later.  He is doing very well now without any cardiac symptoms.  He had mild left ventricular dysfunction in April and I am hoping that has continued to improve.  He wanted to make sure we rechecked his left ventricular function in  before his deductible started over again and that seems very reasonable to me.  I will go ahead and order an echocardiogram and discuss those results with him by phone.  If there is any concerning issue there, we can have him come back and talk about that and person.  Otherwise, I will plan to see him back again in 6 months, repeat some lab tests and do a stress test prior to my visit with him.      cc:      Chaz Contreras MD    [facility]   [address] [place] [address]         LISA PARDO MD, [facility]             D: 2017 15:47   T: 2017 17:34   MT: AKANKSHA      Name:     MARIAN GASPAR   MRN:      [id number]        Account:      [id number]   :      1960           Service Date: 2017      Document: [id number]

## 2017-11-14 NOTE — LETTER
11/14/2017    FACUNDO SOLO  [facility] [address]  [place] [address]      RE: Mark Ramos Greer       Dear Colleague,    I had the pleasure of seeing Mark Butterfield in the [facility].    HISTORY OF PRESENT ILLNESS:  I had the opportunity to see Mr. Mark Butterfield in Cardiology Clinic today at the [facility] in [place] for reevaluation of coronary artery disease following an acute anterior ST elevation myocardial infarction in 01/2017.  Mr. Butterfield presented on 01/24/2017 with chest pain symptoms and anterior ST elevation and went emergently to the Cardiac Catheterization Laboratory.  He underwent angioplasty and stenting of the LAD with a drug-eluting stent.  He also had angioplasty of a diagonal branch artery which was narrowed during that procedure.        He returned to the Cath Lab 3 days later for staged stenting procedure of his mid right-sided PDA which had an 85% stenosis.  His troponin peaked at 130, which is certainly higher than I would have expected it, given how quickly he came in to the hospital.  Initially he has mild to moderate left ventricular dysfunction with an ejection fraction of 40%-45%.  His followup echocardiogram on 04/10/2017 showed persistent mild left ventricular systolic dysfunction with an ejection fraction of 45%-50%.  He had no significant valvular disease and borderline aortic root enlargement.      Since I saw him in April, he has been doing very well.  He has been active without any chest pain symptoms or shortness of breath.  He has no palpitations, lightheadedness or syncope issues.  He seems to be tolerating his medications well.  His cholesterol numbers are good, although not perfect.  His LDL is stalled at 75, HDL 40, total cholesterol is 128, triglycerides 64.      PHYSICAL EXAMINATION:  His blood pressure is 113/72, heart rate 55 and weight 244 pounds.  His lungs are clear.  His  heart rhythm is regular.  He has no cardiac murmurs or carotid bruits.      IMPRESSIONS:  Mr. Mark uBtterfield is a 57-year-old gentleman with a history of anterior wall infarction in 01/2017, treated emergently with stenting of his LAD and angioplasty of the diagonal branch.  He then had stenting of the PDA 3 days later.  He is doing very well now without any cardiac symptoms.  He had mild left ventricular dysfunction in April and I am hoping that has continued to improve.  He wanted to make sure we rechecked his left ventricular function in 2017 before his deductible started over again and that seems very reasonable to me.  I will go ahead and order an echocardiogram and discuss those results with him by phone.  If there is any concerning issue there, we can have him come back and talk about that and person.  Otherwise, I will plan to see him back again in 6 months, repeat some lab tests and do a stress test prior to my visit with him.        Outpatient Encounter Prescriptions as of 11/14/2017   Medication Sig Dispense Refill     atorvastatin (LIPITOR) 80 MG tablet Take 1 tablet (80 mg) by mouth At Bedtime 90 tablet 0     carvedilol (COREG) 12.5 MG tablet Take 1 tablet (12.5 mg) by mouth 2 times daily (with meals) 180 tablet 0     ticagrelor (BRILINTA) 90 MG tablet Take 1 tablet (90 mg) by mouth every 12 hours To protect your stent(s).  Do not stop taking unless directed by cardiology. 180 tablet 1     aspirin 81 MG EC tablet Take 1 tablet (81 mg) by mouth daily 90 tablet 2     lisinopril (PRINIVIL/ZESTRIL) 10 MG tablet Take 0.5 tablets (5 mg) by mouth daily       nitroglycerin (NITROSTAT) 0.4 MG sublingual tablet Place 1 tablet (0.4 mg) under the tongue every 5 minutes as needed for chest pain (may repeat x 2) 25 tablet 1     No facility-administered encounter medications on file as of 11/14/2017.        Again, thank you for allowing me to participate in the care of your patient.      Sincerely,    LISA PARDO,  MD     [facility]

## 2017-11-29 ENCOUNTER — HOSPITAL ENCOUNTER (OUTPATIENT)
Dept: CARDIOLOGY | Facility: CLINIC | Age: 57
Discharge: HOME | End: 2017-11-29
Attending: INTERNAL MEDICINE | Admitting: INTERNAL MEDICINE
Payer: COMMERCIAL

## 2017-11-29 DIAGNOSIS — I25.5 ISCHEMIC CARDIOMYOPATHY: ICD-10-CM

## 2017-11-29 PROCEDURE — 40000264 ECHO COMPLETE WITH LUMASON

## 2017-11-29 PROCEDURE — 25500064 ZZH RX 255 OP 636: Performed by: INTERNAL MEDICINE

## 2017-11-29 PROCEDURE — 93306 TTE W/DOPPLER COMPLETE: CPT | Mod: 26 | Performed by: INTERNAL MEDICINE

## 2017-11-29 RX ADMIN — SULFUR HEXAFLUORIDE 2 ML: KIT at 09:05

## 2018-01-18 DIAGNOSIS — E78.5 DYSLIPIDEMIA, GOAL LDL BELOW 70: ICD-10-CM

## 2018-01-18 DIAGNOSIS — I10 BENIGN ESSENTIAL HTN: ICD-10-CM

## 2018-01-18 DIAGNOSIS — I25.5 ISCHEMIC CARDIOMYOPATHY: ICD-10-CM

## 2018-01-18 RX ORDER — CARVEDILOL 12.5 MG/1
12.5 TABLET ORAL 2 TIMES DAILY WITH MEALS
Qty: 180 TABLET | Refills: 3 | Status: SHIPPED | OUTPATIENT
Start: 2018-01-18 | End: 2019-02-12

## 2018-01-18 RX ORDER — ATORVASTATIN CALCIUM 80 MG/1
80 TABLET, FILM COATED ORAL AT BEDTIME
Qty: 90 TABLET | Refills: 3 | Status: SHIPPED | OUTPATIENT
Start: 2018-01-18 | End: 2019-02-12

## 2018-02-02 ENCOUNTER — TELEPHONE (OUTPATIENT)
Dept: CARDIOLOGY | Facility: CLINIC | Age: 58
End: 2018-02-02

## 2018-02-02 NOTE — TELEPHONE ENCOUNTER
RN received a fax refill for Lisinopril 10 mg daily. Progress notes and MAR shows patient is taking 5 mg (0.5 tab) daily. RN called patient to confirm dose. Pt is taking (0.5 tab=5 mg) daily. RN called pharmacy to inform them of the dose patient is taking.

## 2018-05-01 DIAGNOSIS — I21.3 ST ELEVATION MYOCARDIAL INFARCTION (STEMI), UNSPECIFIED ARTERY (H): ICD-10-CM

## 2018-08-14 ENCOUNTER — HOSPITAL ENCOUNTER (OUTPATIENT)
Dept: CARDIOLOGY | Facility: CLINIC | Age: 58
Discharge: HOME | End: 2018-08-14
Attending: INTERNAL MEDICINE | Admitting: INTERNAL MEDICINE
Payer: COMMERCIAL

## 2018-08-14 DIAGNOSIS — I25.10 CORONARY ARTERY DISEASE INVOLVING NATIVE CORONARY ARTERY OF NATIVE HEART WITHOUT ANGINA PECTORIS: ICD-10-CM

## 2018-08-14 PROCEDURE — 93325 DOPPLER ECHO COLOR FLOW MAPG: CPT | Mod: 26 | Performed by: INTERNAL MEDICINE

## 2018-08-14 PROCEDURE — 25500064 ZZH RX 255 OP 636: Performed by: INTERNAL MEDICINE

## 2018-08-14 PROCEDURE — 93321 DOPPLER ECHO F-UP/LMTD STD: CPT | Mod: 26 | Performed by: INTERNAL MEDICINE

## 2018-08-14 PROCEDURE — 93016 CV STRESS TEST SUPVJ ONLY: CPT | Performed by: INTERNAL MEDICINE

## 2018-08-14 PROCEDURE — 93350 STRESS TTE ONLY: CPT | Mod: 26 | Performed by: INTERNAL MEDICINE

## 2018-08-14 PROCEDURE — 93017 CV STRESS TEST TRACING ONLY: CPT

## 2018-08-14 PROCEDURE — 93018 CV STRESS TEST I&R ONLY: CPT | Performed by: INTERNAL MEDICINE

## 2018-08-14 RX ADMIN — HUMAN ALBUMIN MICROSPHERES AND PERFLUTREN 3 ML: 10; .22 INJECTION, SOLUTION INTRAVENOUS at 09:07

## 2018-08-30 ENCOUNTER — OFFICE VISIT (OUTPATIENT)
Dept: CARDIOLOGY | Facility: CLINIC | Age: 58
End: 2018-08-30
Attending: INTERNAL MEDICINE
Payer: COMMERCIAL

## 2018-08-30 VITALS
WEIGHT: 245.8 LBS | HEART RATE: 63 BPM | HEIGHT: 74 IN | DIASTOLIC BLOOD PRESSURE: 78 MMHG | SYSTOLIC BLOOD PRESSURE: 114 MMHG | BODY MASS INDEX: 31.54 KG/M2

## 2018-08-30 DIAGNOSIS — I21.3 ST ELEVATION MYOCARDIAL INFARCTION (STEMI), UNSPECIFIED ARTERY (H): ICD-10-CM

## 2018-08-30 DIAGNOSIS — I25.10 CORONARY ARTERY DISEASE INVOLVING NATIVE CORONARY ARTERY OF NATIVE HEART WITHOUT ANGINA PECTORIS: Primary | ICD-10-CM

## 2018-08-30 DIAGNOSIS — E78.5 DYSLIPIDEMIA, GOAL LDL BELOW 70: ICD-10-CM

## 2018-08-30 DIAGNOSIS — I10 BENIGN ESSENTIAL HTN: ICD-10-CM

## 2018-08-30 DIAGNOSIS — I25.5 ISCHEMIC CARDIOMYOPATHY: ICD-10-CM

## 2018-08-30 LAB
ALT SERPL W P-5'-P-CCNC: 77 U/L (ref 5–30)
ANION GAP SERPL CALCULATED.3IONS-SCNC: 12.2 MMOL/L (ref 6–17)
BUN SERPL-MCNC: 18 MG/DL (ref 7–30)
CALCIUM SERPL-MCNC: 9.2 MG/DL (ref 8.5–10.5)
CHLORIDE SERPL-SCNC: 103 MMOL/L (ref 98–107)
CHOLEST SERPL-MCNC: 127 MG/DL
CO2 SERPL-SCNC: 26 MMOL/L (ref 23–29)
CREAT SERPL-MCNC: 1.26 MG/DL (ref 0.7–1.3)
GFR SERPL CREATININE-BSD FRML MDRD: 59 ML/MIN/1.7M2
GLUCOSE SERPL-MCNC: 92 MG/DL (ref 70–105)
HDLC SERPL-MCNC: 39 MG/DL
LDLC SERPL CALC-MCNC: 74 MG/DL
NONHDLC SERPL-MCNC: 88 MG/DL
POTASSIUM SERPL-SCNC: 4.2 MMOL/L (ref 3.5–5.1)
SODIUM SERPL-SCNC: 137 MMOL/L (ref 136–145)
TRIGL SERPL-MCNC: 69 MG/DL

## 2018-08-30 PROCEDURE — 80048 BASIC METABOLIC PNL TOTAL CA: CPT | Performed by: INTERNAL MEDICINE

## 2018-08-30 PROCEDURE — 80061 LIPID PANEL: CPT | Performed by: INTERNAL MEDICINE

## 2018-08-30 PROCEDURE — 99214 OFFICE O/P EST MOD 30 MIN: CPT | Performed by: INTERNAL MEDICINE

## 2018-08-30 PROCEDURE — 84460 ALANINE AMINO (ALT) (SGPT): CPT | Performed by: INTERNAL MEDICINE

## 2018-08-30 PROCEDURE — 36415 COLL VENOUS BLD VENIPUNCTURE: CPT | Performed by: INTERNAL MEDICINE

## 2018-08-30 RX ORDER — NITROGLYCERIN 0.4 MG/1
0.4 TABLET SUBLINGUAL EVERY 5 MIN PRN
Qty: 25 TABLET | Refills: 1 | Status: SHIPPED | OUTPATIENT
Start: 2018-08-30 | End: 2021-03-05

## 2018-08-30 RX ORDER — LISINOPRIL 10 MG/1
10 TABLET ORAL DAILY
Qty: 90 TABLET | Refills: 3 | Status: SHIPPED | OUTPATIENT
Start: 2018-08-30 | End: 2019-09-09

## 2018-08-30 NOTE — LETTER
8/30/2018      FACUNDO SOLO  [facility] [address]  [place] [address]      RE: Mark Ramos Greer       Dear Colleague,    I had the pleasure of seeing Mark Butterfield in the [facility].    Service Date: 08/30/2018      HISTORY OF PRESENT ILLNESS:  I had the opportunity to see Mr. Mark Butterfield in Cardiology Clinic today at the [facility] in [place] for reevaluation of coronary artery disease following an acute anterior ST elevation myocardial infarction in 2017.  He presented with typical chest pain symptoms and anterior ST elevation and went emergently to the Cardiac Catheterization Laboratory and had stenting of the LAD.  His symptoms had been fairly brief before he came to the hospital, but his troponin rise was still quite high at about 130.  He underwent angioplasty of a diagonal branch at the same time and 3 days later underwent stenting of his posterior descending coronary artery.  His ejection fraction was initially 40%-45% and appeared to be slightly better on his followup echocardiogram at 45%-50%.  However, his stress echocardiogram prior to my visit with him today showed that his ejection fraction was still at that 40%-45% range, indicating mild to moderate left ventricular dysfunction.  He has persistent anterior septal and apical hypokinesis, but developed no new wall motion abnormalities with exercise.  He had no chest pain and exercised extremely well on the treadmill.  There was no evidence of ischemia.  He was able to complete 11 minutes on the [place] protocol.      His risk factors include hypertension and dyslipidemia which are both well treated with medical therapy.  He is taking aspirin now and finished his Brilinta course in January.      He experiences occasional twinges of left-sided chest discomfort at rest but never has any chest discomfort with exertion and never any discomfort that last longer  than a moment.      PHYSICAL EXAMINATION:  His blood pressure is 114/78, heart rate 63 and weight 245 pounds.  His lungs are clear.  His heart rhythm is regular.  There are no cardiac murmurs or carotid bruits.      IMPRESSIONS:  Mr. Marian Butterfield is a 57-year-old gentleman with a history of anterior wall ST elevation myocardial infarction in 2017, treated emergently with angioplasty and stenting of the LAD.  He also had diagonal angioplasty and right coronary artery/PDA stenting 3 days later.  He is doing very well now without any symptoms or signs of recurrent angina or congestive heart failure.  His left ventricular ejection fraction is stable at 40%-45%, indicating mild to moderate left ventricular dysfunction.  His cardiac risk factors are well managed with medical therapy.        I did suggest that he increase his lisinopril from 5 mg to 10 mg daily in order to help prevent deterioration in his left ventricular systolic function.  I urged him to stay active and continue with his heart-healthy diet and I will plan to see him back again in 1 year.  I did notice that his ALT was slightly higher today than it had been.  It is up to 77.  He denies using any alcohol whatsoever.  This may be from the atorvastatin.  I suggested that he follow up with some blood tests again in 3 months to recheck the liver panel and make sure that is not a progressive issue.  At this level, I am not concerned enough to stop atorvastatin, but want to keep an eye on that.      cc:      Chaz Contreras MD    [facility]   [address] [facility] [address]         LISA PARDO MD, [facility]             D: 2018   T: 2018   MT: AKANKSHA      Name:     MARIAN BUTTERFIELD   MRN:      [id number]        Account:      [id number]   :      1960           Service Date: 2018      Document: [id number]         Outpatient Encounter Prescriptions as of 2018   Medication Sig Dispense Refill     aspirin 81 MG  EC tablet Take 1 tablet (81 mg) by mouth daily 90 tablet 2     atorvastatin (LIPITOR) 80 MG tablet Take 1 tablet (80 mg) by mouth At Bedtime 90 tablet 3     carvedilol (COREG) 12.5 MG tablet Take 1 tablet (12.5 mg) by mouth 2 times daily (with meals) 180 tablet 3     lisinopril (PRINIVIL/ZESTRIL) 10 MG tablet Take 1 tablet (10 mg) by mouth daily 90 tablet 3     nitroGLYcerin (NITROSTAT) 0.4 MG sublingual tablet Place 1 tablet (0.4 mg) under the tongue every 5 minutes as needed for chest pain (may repeat x 2) 25 tablet 1     [DISCONTINUED] lisinopril (PRINIVIL/ZESTRIL) 10 MG tablet Take 0.5 tablets (5 mg) by mouth daily       [DISCONTINUED] nitroglycerin (NITROSTAT) 0.4 MG sublingual tablet Place 1 tablet (0.4 mg) under the tongue every 5 minutes as needed for chest pain (may repeat x 2) 25 tablet 1     [DISCONTINUED] ticagrelor (BRILINTA) 90 MG tablet Take 1 tablet (90 mg) by mouth every 12 hours To protect your stent(s).  Do not stop taking unless directed by cardiology. (Patient not taking: Reported on 8/30/2018) 180 tablet 1     No facility-administered encounter medications on file as of 8/30/2018.        Again, thank you for allowing me to participate in the care of your patient.      Sincerely,    LISA PARDO MD     [facility]

## 2018-08-30 NOTE — MR AVS SNAPSHOT
After Visit Summary   8/30/2018    Mark Butterfield    MRN: [id number]           Patient Information     Date Of Birth          1960        Visit Information        Provider Department      8/30/2018 8:45 AM Tomas Joe MD [facility]        Today's Diagnoses     Coronary artery disease involving native coronary artery of native heart without angina pectoris    -  1    Ischemic cardiomyopathy        ST elevation myocardial infarction (STEMI), unspecified artery (H)        Benign essential HTN        Dyslipidemia, goal LDL below 70           Follow-ups after your visit        Additional Services     Follow-Up with Cardiologist                 Future tests that were ordered for you today     Open Future Orders        Priority Expected Expires Ordered    Hepatic panel Routine 9/6/2018 8/30/2019 8/30/2018    Basic metabolic panel Routine 8/30/2019 8/31/2019 8/30/2018    Lipid Profile Routine 8/30/2019 8/30/2019 8/30/2018    ALT Routine 8/30/2019 8/30/2019 8/30/2018    Echocardiogram Routine 8/30/2019 8/31/2019 8/30/2018    Follow-Up with Cardiologist Routine 8/30/2019 8/31/2019 8/30/2018            Who to contact     If you have questions or need follow up information about today's clinic visit or your schedule please contact [facility] directly at [phone].  Normal or non-critical lab and imaging results will be communicated to you by MyChart, letter or phone within 4 business days after the clinic has received the results. If you do not hear from us within 7 days, please contact the clinic through MyChart or phone. If you have a critical or abnormal lab result, we will notify you by phone as soon as possible.  Submit refill requests through [employer] or call your pharmacy and they will forward the refill request to us. Please allow 3 business days for your refill to be completed.          Additional Information  "About Your Visit        [employer] Information     [employer] lets you send messages to your doctor, view your test results, renew your prescriptions, schedule appointments and more. To sign up, go to [email or web address] . Click on \"Log in\" on the left side of the screen, which will take you to the Welcome page. Then click on \"Sign up Now\" on the right side of the page.     You will be asked to enter the access code listed below, as well as some personal information. Please follow the directions to create your username and password.     Your access code is: [email or web address]  Expires: 2018  7:52 AM     Your access code will  in 90 days. If you need help or a new code, please call your [facility] clinic or [phone].        Care EveryWhere ID     This is your Care EveryWhere ID. This could be used by other organizations to access your [facility] medical records  [id number]        Your Vitals Were     Pulse Height BMI (Body Mass Index)             63 1.88 m (6' 2\") 31.56 kg/m2          Blood Pressure from Last 3 Encounters:   18 114/78   17 113/72   17 110/64    Weight from Last 3 Encounters:   18 111.5 kg (245 lb 12.8 oz)   17 110.9 kg (244 lb 8 oz)   17 111.1 kg (245 lb)              We Performed the Following     Follow-Up with Cardiologist          Today's Medication Changes          These changes are accurate as of 18  9:19 AM.  If you have any questions, ask your nurse or doctor.               These medicines have changed or have updated prescriptions.        Dose/Directions    lisinopril 10 MG tablet   Commonly known as:  PRINIVIL/ZESTRIL   This may have changed:  how much to take   Used for:  Ischemic cardiomyopathy, ST elevation myocardial infarction (STEMI), unspecified artery (H), Benign essential HTN   Changed by:  Tomas Joe MD        Dose:  10 mg   Take 1 tablet (10 mg) by mouth daily   Quantity:  90 tablet   Refills:  3         Stop taking these " medicines if you haven't already. Please contact your care team if you have questions.     ticagrelor 90 MG tablet   Commonly known as:  BRILINTA   Stopped by:  Tomas Joe MD                Where to get your medicines      These medications were sent to [facility] [address] [facility] - [place], [place] - [address]  [address] [address], [facility] [address]     Phone:  [phone]     lisinopril 10 MG tablet    nitroGLYcerin 0.4 MG sublingual tablet                Primary Care Provider Office Phone # Fax #    Chaz Contreras [phone]       [facility] [address]  [place] [address]        Equal Access to Services     DENA HERR : Hadii aad ku hadasho Soomaali, waaxda luqadaha, qaybta kaalmada adeegyada, waxay idiin hayaan adebony garcia . [facility] [phone].    ATENCIÓN: Si habla español, tiene a welsh disposición servicios gratuitos de asistencia lingüística. [facility] [phone].    We comply with applicable federal civil rights laws and [place] laws. We do not discriminate on the basis of race, color, national origin, age, disability, sex, sexual orientation, or gender identity.            Thank you!     Thank you for choosing [facility]  for your care. Our goal is always to provide you with excellent care. Hearing back from our patients is one way we can continue to improve our services. Please take a few minutes to complete the written survey that you may receive in the mail after your visit with us. Thank you!             Your Updated Medication List - Protect others around you: Learn how to safely use, store and throw away your medicines at www.disposemymeds.org.          This list is accurate as of 8/30/18  9:19 AM.  Always use your most recent med list.                   Brand Name Dispense Instructions for use Diagnosis    aspirin 81 MG EC tablet     90 tablet    Take 1 tablet (81 mg) by mouth daily     ST elevation myocardial infarction (STEMI), unspecified artery (H)       atorvastatin 80 MG tablet    LIPITOR    90 tablet    Take 1 tablet (80 mg) by mouth At Bedtime    Dyslipidemia, goal LDL below 70       carvedilol 12.5 MG tablet    COREG    180 tablet    Take 1 tablet (12.5 mg) by mouth 2 times daily (with meals)    Ischemic cardiomyopathy, Benign essential HTN       lisinopril 10 MG tablet    PRINIVIL/ZESTRIL    90 tablet    Take 1 tablet (10 mg) by mouth daily    Ischemic cardiomyopathy, ST elevation myocardial infarction (STEMI), unspecified artery (H), Benign essential HTN       nitroGLYcerin 0.4 MG sublingual tablet    NITROSTAT    25 tablet    Place 1 tablet (0.4 mg) under the tongue every 5 minutes as needed for chest pain (may repeat x 2)    ST elevation myocardial infarction (STEMI), unspecified artery (H)

## 2018-08-30 NOTE — LETTER
8/30/2018    FACUNDO SOLO  [facility] [address]  [place] [address]    RE: Mark Butterfield       Dear Colleague,    I had the pleasure of seeing Mark Butterfield in the [facility].    HPI and Plan:   See dictation    Orders Placed This Encounter   Procedures     Basic metabolic panel     Lipid Profile     ALT     Hepatic panel     Follow-Up with Cardiologist     Echocardiogram       Orders Placed This Encounter   Medications     lisinopril (PRINIVIL/ZESTRIL) 10 MG tablet     Sig: Take 1 tablet (10 mg) by mouth daily     Dispense:  90 tablet     Refill:  3     nitroGLYcerin (NITROSTAT) 0.4 MG sublingual tablet     Sig: Place 1 tablet (0.4 mg) under the tongue every 5 minutes as needed for chest pain (may repeat x 2)     Dispense:  25 tablet     Refill:  1       Medications Discontinued During This Encounter   Medication Reason     ticagrelor (BRILINTA) 90 MG tablet      lisinopril (PRINIVIL/ZESTRIL) 10 MG tablet Reorder     nitroglycerin (NITROSTAT) 0.4 MG sublingual tablet Reorder         Encounter Diagnoses   Name Primary?     Coronary artery disease involving native coronary artery of native heart without angina pectoris Yes     Ischemic cardiomyopathy      ST elevation myocardial infarction (STEMI), unspecified artery (H)      Benign essential HTN      Dyslipidemia, goal LDL below 70        CURRENT MEDICATIONS:  Current Outpatient Prescriptions   Medication Sig Dispense Refill     aspirin 81 MG EC tablet Take 1 tablet (81 mg) by mouth daily 90 tablet 2     atorvastatin (LIPITOR) 80 MG tablet Take 1 tablet (80 mg) by mouth At Bedtime 90 tablet 3     carvedilol (COREG) 12.5 MG tablet Take 1 tablet (12.5 mg) by mouth 2 times daily (with meals) 180 tablet 3     lisinopril (PRINIVIL/ZESTRIL) 10 MG tablet Take 1 tablet (10 mg) by mouth daily 90 tablet 3     nitroGLYcerin (NITROSTAT) 0.4 MG sublingual tablet Place 1 tablet (0.4 mg) under the  tongue every 5 minutes as needed for chest pain (may repeat x 2) 25 tablet 1     [DISCONTINUED] lisinopril (PRINIVIL/ZESTRIL) 10 MG tablet Take 0.5 tablets (5 mg) by mouth daily       [DISCONTINUED] nitroglycerin (NITROSTAT) 0.4 MG sublingual tablet Place 1 tablet (0.4 mg) under the tongue every 5 minutes as needed for chest pain (may repeat x 2) 25 tablet 1       ALLERGIES     Allergies   Allergen Reactions     Penicillins      nausea       PAST MEDICAL HISTORY:  Past Medical History:   Diagnosis Date     Benign essential HTN 1/27/2017     Coronary artery disease involving native coronary artery of native heart without angina pectoris 1/31/2017     Dyslipidemia, goal LDL below 70 1/27/2017     Ischemic cardiomyopathy 1/27/2017     ST elevation (STEMI) myocardial infarction involving left anterior descending coronary artery (H) 1/24/2017       PAST SURGICAL HISTORY:  Past Surgical History:   Procedure Laterality Date     HEART CATH LEFT HEART CATH  01/24/2017    LIZY to mid LAD     HEART CATH LEFT HEART CATH  01/27/2017    LIZY to mid rPDA       FAMILY HISTORY:  Family History   Problem Relation Age of Onset     Coronary Artery Disease Mother      Coronary Artery Disease Father        SOCIAL HISTORY:  Social History     Social History     Marital status:      Spouse name: N/A     Number of children: N/A     Years of education: N/A     Social History Main Topics     Smoking status: Never Smoker     Smokeless tobacco: Never Used     Alcohol use No     Drug use: No     Sexual activity: Not Asked     Other Topics Concern     Parent/Sibling W/ Cabg, Mi Or Angioplasty Before 65f 55m? No     Social History Narrative       Review of Systems:  Skin:  Negative       Eyes:  Negative      ENT:  Negative      Respiratory:  Negative   infrequent cough, has had a cold   Cardiovascular:  Negative   lightheadedness with quick movements  Gastroenterology: Negative      Genitourinary:  not assessed      Musculoskeletal:  Negative  "     Neurologic:  Negative      Psychiatric:  Negative      Heme/Lymph/Imm:  Negative      Endocrine:  Negative        Physical Exam:  Vitals: /78  Pulse 63  Ht 1.88 m (6' 2\")  Wt 111.5 kg (245 lb 12.8 oz)  BMI 31.56 kg/m2    Constitutional:  cooperative, alert and oriented, well developed, well nourished, in no acute distress        Skin:  warm and dry to the touch, no apparent skin lesions or masses noted          Head:  normocephalic, no masses or lesions        Eyes:  pupils equal and round, conjunctivae and lids unremarkable, sclera white, no xanthalasma, EOMS intact, no nystagmus        Lymph:      ENT:  no pallor or cyanosis, dentition good        Neck:  carotid pulses are full and equal bilaterally, JVP normal, no carotid bruit        Respiratory:  normal breath sounds, clear to auscultation, normal A-P diameter, normal symmetry, normal respiratory excursion, no use of accessory muscles         Cardiac: regular rhythm, normal S1/S2, no S3 or S4, apical impulse not displaced, no murmurs, gallops or rubs                pulses full and equal, no bruits auscultated                                        GI:  abdomen soft;BS normoactive        Extremities and Muscular Skeletal:  no deformities, clubbing, cyanosis, erythema observed;no edema              Neurological:  no gross motor deficits;affect appropriate        Psych:  affect appropriate, oriented to time, person and place        CC  Tomas Joe MD  [address] [place] [address]                Thank you for allowing me to participate in the care of your patient.      Sincerely,     TOMAS JOE MD     [facility]    cc:   Tomas Joe MD  [address]  [place] [address]        "

## 2018-08-30 NOTE — PROGRESS NOTES
HPI and Plan:   See dictation    Orders Placed This Encounter   Procedures     Basic metabolic panel     Lipid Profile     ALT     Hepatic panel     Follow-Up with Cardiologist     Echocardiogram       Orders Placed This Encounter   Medications     lisinopril (PRINIVIL/ZESTRIL) 10 MG tablet     Sig: Take 1 tablet (10 mg) by mouth daily     Dispense:  90 tablet     Refill:  3     nitroGLYcerin (NITROSTAT) 0.4 MG sublingual tablet     Sig: Place 1 tablet (0.4 mg) under the tongue every 5 minutes as needed for chest pain (may repeat x 2)     Dispense:  25 tablet     Refill:  1       Medications Discontinued During This Encounter   Medication Reason     ticagrelor (BRILINTA) 90 MG tablet      lisinopril (PRINIVIL/ZESTRIL) 10 MG tablet Reorder     nitroglycerin (NITROSTAT) 0.4 MG sublingual tablet Reorder         Encounter Diagnoses   Name Primary?     Coronary artery disease involving native coronary artery of native heart without angina pectoris Yes     Ischemic cardiomyopathy      ST elevation myocardial infarction (STEMI), unspecified artery (H)      Benign essential HTN      Dyslipidemia, goal LDL below 70        CURRENT MEDICATIONS:  Current Outpatient Prescriptions   Medication Sig Dispense Refill     aspirin 81 MG EC tablet Take 1 tablet (81 mg) by mouth daily 90 tablet 2     atorvastatin (LIPITOR) 80 MG tablet Take 1 tablet (80 mg) by mouth At Bedtime 90 tablet 3     carvedilol (COREG) 12.5 MG tablet Take 1 tablet (12.5 mg) by mouth 2 times daily (with meals) 180 tablet 3     lisinopril (PRINIVIL/ZESTRIL) 10 MG tablet Take 1 tablet (10 mg) by mouth daily 90 tablet 3     nitroGLYcerin (NITROSTAT) 0.4 MG sublingual tablet Place 1 tablet (0.4 mg) under the tongue every 5 minutes as needed for chest pain (may repeat x 2) 25 tablet 1     [DISCONTINUED] lisinopril (PRINIVIL/ZESTRIL) 10 MG tablet Take 0.5 tablets (5 mg) by mouth daily       [DISCONTINUED] nitroglycerin (NITROSTAT) 0.4 MG sublingual tablet Place 1  "tablet (0.4 mg) under the tongue every 5 minutes as needed for chest pain (may repeat x 2) 25 tablet 1       ALLERGIES     Allergies   Allergen Reactions     Penicillins      nausea       PAST MEDICAL HISTORY:  Past Medical History:   Diagnosis Date     Benign essential HTN 1/27/2017     Coronary artery disease involving native coronary artery of native heart without angina pectoris 1/31/2017     Dyslipidemia, goal LDL below 70 1/27/2017     Ischemic cardiomyopathy 1/27/2017     ST elevation (STEMI) myocardial infarction involving left anterior descending coronary artery (H) 1/24/2017       PAST SURGICAL HISTORY:  Past Surgical History:   Procedure Laterality Date     HEART CATH LEFT HEART CATH  01/24/2017    LIZY to mid LAD     HEART CATH LEFT HEART CATH  01/27/2017    LIZY to mid rPDA       FAMILY HISTORY:  Family History   Problem Relation Age of Onset     Coronary Artery Disease Mother      Coronary Artery Disease Father        SOCIAL HISTORY:  Social History     Social History     Marital status:      Spouse name: N/A     Number of children: N/A     Years of education: N/A     Social History Main Topics     Smoking status: Never Smoker     Smokeless tobacco: Never Used     Alcohol use No     Drug use: No     Sexual activity: Not Asked     Other Topics Concern     Parent/Sibling W/ Cabg, Mi Or Angioplasty Before 65f 55m? No     Social History Narrative       Review of Systems:  Skin:  Negative       Eyes:  Negative      ENT:  Negative      Respiratory:  Negative   infrequent cough, has had a cold   Cardiovascular:  Negative   lightheadedness with quick movements  Gastroenterology: Negative      Genitourinary:  not assessed      Musculoskeletal:  Negative      Neurologic:  Negative      Psychiatric:  Negative      Heme/Lymph/Imm:  Negative      Endocrine:  Negative        Physical Exam:  Vitals: /78  Pulse 63  Ht 1.88 m (6' 2\")  Wt 111.5 kg (245 lb 12.8 oz)  BMI 31.56 kg/m2    Constitutional:  " cooperative, alert and oriented, well developed, well nourished, in no acute distress        Skin:  warm and dry to the touch, no apparent skin lesions or masses noted          Head:  normocephalic, no masses or lesions        Eyes:  pupils equal and round, conjunctivae and lids unremarkable, sclera white, no xanthalasma, EOMS intact, no nystagmus        Lymph:      ENT:  no pallor or cyanosis, dentition good        Neck:  carotid pulses are full and equal bilaterally, JVP normal, no carotid bruit        Respiratory:  normal breath sounds, clear to auscultation, normal A-P diameter, normal symmetry, normal respiratory excursion, no use of accessory muscles         Cardiac: regular rhythm, normal S1/S2, no S3 or S4, apical impulse not displaced, no murmurs, gallops or rubs                pulses full and equal, no bruits auscultated                                        GI:  abdomen soft;BS normoactive        Extremities and Muscular Skeletal:  no deformities, clubbing, cyanosis, erythema observed;no edema              Neurological:  no gross motor deficits;affect appropriate        Psych:  affect appropriate, oriented to time, person and place          Tomas Joe MD  [address]  [place] [address]

## 2018-08-30 NOTE — PROGRESS NOTES
Service Date: 08/30/2018      HISTORY OF PRESENT ILLNESS:  I had the opportunity to see Mr. Mark Butterfield in Cardiology Clinic today at the [facility] in [place] for reevaluation of coronary artery disease following an acute anterior ST elevation myocardial infarction in 2017.  He presented with typical chest pain symptoms and anterior ST elevation and went emergently to the Cardiac Catheterization Laboratory and had stenting of the LAD.  His symptoms had been fairly brief before he came to the hospital, but his troponin rise was still quite high at about 130.  He underwent angioplasty of a diagonal branch at the same time and 3 days later underwent stenting of his posterior descending coronary artery.  His ejection fraction was initially 40%-45% and appeared to be slightly better on his followup echocardiogram at 45%-50%.  However, his stress echocardiogram prior to my visit with him today showed that his ejection fraction was still at that 40%-45% range, indicating mild to moderate left ventricular dysfunction.  He has persistent anterior septal and apical hypokinesis, but developed no new wall motion abnormalities with exercise.  He had no chest pain and exercised extremely well on the treadmill.  There was no evidence of ischemia.  He was able to complete 11 minutes on the [place] protocol.      His risk factors include hypertension and dyslipidemia which are both well treated with medical therapy.  He is taking aspirin now and finished his Brilinta course in January.      He experiences occasional twinges of left-sided chest discomfort at rest but never has any chest discomfort with exertion and never any discomfort that last longer than a moment.      PHYSICAL EXAMINATION:  His blood pressure is 114/78, heart rate 63 and weight 245 pounds.  His lungs are clear.  His heart rhythm is regular.  There are no cardiac murmurs or carotid bruits.      IMPRESSIONS:  Mr. Mark Butterfield is a  57-year-old gentleman with a history of anterior wall ST elevation myocardial infarction in 2017, treated emergently with angioplasty and stenting of the LAD.  He also had diagonal angioplasty and right coronary artery/PDA stenting 3 days later.  He is doing very well now without any symptoms or signs of recurrent angina or congestive heart failure.  His left ventricular ejection fraction is stable at 40%-45%, indicating mild to moderate left ventricular dysfunction.  His cardiac risk factors are well managed with medical therapy.        I did suggest that he increase his lisinopril from 5 mg to 10 mg daily in order to help prevent deterioration in his left ventricular systolic function.  I urged him to stay active and continue with his heart-healthy diet and I will plan to see him back again in 1 year.  I did notice that his ALT was slightly higher today than it had been.  It is up to 77.  He denies using any alcohol whatsoever.  This may be from the atorvastatin.  I suggested that he follow up with some blood tests again in 3 months to recheck the liver panel and make sure that is not a progressive issue.  At this level, I am not concerned enough to stop atorvastatin, but want to keep an eye on that.      cc:      Chaz Contreras MD    [facility]   [address] [place] [address]         LISA PARDO MD, [facility]             D: 2018   T: 2018   MT: AKANKSHA      Name:     MARIAN GASPAR   MRN:      [id number]        Account:      [id number]   :      1960           Service Date: 2018      Document: [id number]

## 2018-11-07 DIAGNOSIS — E78.5 DYSLIPIDEMIA, GOAL LDL BELOW 70: ICD-10-CM

## 2018-11-07 LAB
ALBUMIN SERPL-MCNC: 3.6 G/DL (ref 3.4–5)
ALP SERPL-CCNC: 114 U/L (ref 40–150)
ALT SERPL W P-5'-P-CCNC: 56 U/L (ref 0–70)
AST SERPL W P-5'-P-CCNC: 32 U/L (ref 0–45)
BILIRUB DIRECT SERPL-MCNC: 0.3 MG/DL (ref 0–0.2)
BILIRUB SERPL-MCNC: 1.7 MG/DL (ref 0.2–1.3)
PROT SERPL-MCNC: 6.9 G/DL (ref 6.8–8.8)

## 2018-11-07 PROCEDURE — 36415 COLL VENOUS BLD VENIPUNCTURE: CPT | Performed by: INTERNAL MEDICINE

## 2018-11-07 PROCEDURE — 80076 HEPATIC FUNCTION PANEL: CPT | Performed by: INTERNAL MEDICINE

## 2019-01-21 DIAGNOSIS — I21.3 ST ELEVATION MYOCARDIAL INFARCTION (STEMI), UNSPECIFIED ARTERY (H): ICD-10-CM

## 2019-02-12 DIAGNOSIS — I25.5 ISCHEMIC CARDIOMYOPATHY: ICD-10-CM

## 2019-02-12 DIAGNOSIS — E78.5 DYSLIPIDEMIA, GOAL LDL BELOW 70: ICD-10-CM

## 2019-02-12 DIAGNOSIS — I10 BENIGN ESSENTIAL HTN: ICD-10-CM

## 2019-02-12 RX ORDER — CARVEDILOL 12.5 MG/1
12.5 TABLET ORAL 2 TIMES DAILY WITH MEALS
Qty: 180 TABLET | Refills: 1 | Status: SHIPPED | OUTPATIENT
Start: 2019-02-12 | End: 2019-09-06

## 2019-02-12 RX ORDER — ATORVASTATIN CALCIUM 80 MG/1
80 TABLET, FILM COATED ORAL AT BEDTIME
Qty: 90 TABLET | Refills: 1 | Status: SHIPPED | OUTPATIENT
Start: 2019-02-12 | End: 2019-09-06

## 2019-08-29 ENCOUNTER — HOSPITAL ENCOUNTER (OUTPATIENT)
Dept: CARDIOLOGY | Facility: CLINIC | Age: 59
Discharge: HOME | End: 2019-08-29
Attending: INTERNAL MEDICINE | Admitting: INTERNAL MEDICINE
Payer: COMMERCIAL

## 2019-08-29 DIAGNOSIS — E78.5 DYSLIPIDEMIA, GOAL LDL BELOW 70: ICD-10-CM

## 2019-08-29 DIAGNOSIS — I10 BENIGN ESSENTIAL HTN: ICD-10-CM

## 2019-08-29 DIAGNOSIS — I25.5 ISCHEMIC CARDIOMYOPATHY: ICD-10-CM

## 2019-08-29 LAB
ALT SERPL W P-5'-P-CCNC: <5 U/L (ref 5–30)
ANION GAP SERPL CALCULATED.3IONS-SCNC: 9.9 MMOL/L (ref 6–17)
BUN SERPL-MCNC: 16 MG/DL (ref 7–30)
CALCIUM SERPL-MCNC: 9.4 MG/DL (ref 8.5–10.5)
CHLORIDE SERPL-SCNC: 104 MMOL/L (ref 98–107)
CHOLEST SERPL-MCNC: 132 MG/DL
CO2 SERPL-SCNC: 29 MMOL/L (ref 23–29)
CREAT SERPL-MCNC: 1.28 MG/DL (ref 0.7–1.3)
GFR SERPL CREATININE-BSD FRML MDRD: 58 ML/MIN/{1.73_M2}
GLUCOSE SERPL-MCNC: 109 MG/DL (ref 70–105)
HDLC SERPL-MCNC: 40 MG/DL
LDLC SERPL CALC-MCNC: 77 MG/DL
NONHDLC SERPL-MCNC: 92 MG/DL
POTASSIUM SERPL-SCNC: 3.9 MMOL/L (ref 3.5–5.1)
SODIUM SERPL-SCNC: 139 MMOL/L (ref 136–145)
TRIGL SERPL-MCNC: 77 MG/DL

## 2019-08-29 PROCEDURE — 80061 LIPID PANEL: CPT | Performed by: INTERNAL MEDICINE

## 2019-08-29 PROCEDURE — 93306 TTE W/DOPPLER COMPLETE: CPT | Mod: 26 | Performed by: INTERNAL MEDICINE

## 2019-08-29 PROCEDURE — 84460 ALANINE AMINO (ALT) (SGPT): CPT | Performed by: INTERNAL MEDICINE

## 2019-08-29 PROCEDURE — 80048 BASIC METABOLIC PNL TOTAL CA: CPT | Performed by: INTERNAL MEDICINE

## 2019-08-29 PROCEDURE — 40000264 ECHOCARDIOGRAM COMPLETE

## 2019-08-29 PROCEDURE — 36415 COLL VENOUS BLD VENIPUNCTURE: CPT | Performed by: INTERNAL MEDICINE

## 2019-08-29 PROCEDURE — 25500064 ZZH RX 255 OP 636: Performed by: INTERNAL MEDICINE

## 2019-08-29 RX ADMIN — HUMAN ALBUMIN MICROSPHERES AND PERFLUTREN 2 ML: 10; .22 INJECTION, SOLUTION INTRAVENOUS at 08:45

## 2019-09-06 DIAGNOSIS — I25.5 ISCHEMIC CARDIOMYOPATHY: ICD-10-CM

## 2019-09-06 DIAGNOSIS — E78.5 DYSLIPIDEMIA, GOAL LDL BELOW 70: ICD-10-CM

## 2019-09-06 DIAGNOSIS — I10 BENIGN ESSENTIAL HTN: ICD-10-CM

## 2019-09-06 RX ORDER — CARVEDILOL 12.5 MG/1
12.5 TABLET ORAL 2 TIMES DAILY WITH MEALS
Qty: 180 TABLET | Refills: 1 | Status: SHIPPED | OUTPATIENT
Start: 2019-09-06 | End: 2020-03-03

## 2019-09-06 RX ORDER — ATORVASTATIN CALCIUM 80 MG/1
80 TABLET, FILM COATED ORAL AT BEDTIME
Qty: 90 TABLET | Refills: 1 | Status: SHIPPED | OUTPATIENT
Start: 2019-09-06 | End: 2020-03-03

## 2019-09-09 DIAGNOSIS — I21.3 ST ELEVATION MYOCARDIAL INFARCTION (STEMI), UNSPECIFIED ARTERY (H): ICD-10-CM

## 2019-09-09 DIAGNOSIS — I10 BENIGN ESSENTIAL HTN: ICD-10-CM

## 2019-09-09 DIAGNOSIS — I25.5 ISCHEMIC CARDIOMYOPATHY: ICD-10-CM

## 2019-09-09 RX ORDER — LISINOPRIL 10 MG/1
10 TABLET ORAL DAILY
Qty: 90 TABLET | Refills: 0 | Status: SHIPPED | OUTPATIENT
Start: 2019-09-09 | End: 2019-12-26

## 2019-10-11 ENCOUNTER — TELEPHONE (OUTPATIENT)
Dept: OTHER | Facility: CLINIC | Age: 59
End: 2019-10-11

## 2019-11-11 ENCOUNTER — OFFICE VISIT (OUTPATIENT)
Dept: CARDIOLOGY | Facility: CLINIC | Age: 59
End: 2019-11-11
Payer: COMMERCIAL

## 2019-11-11 VITALS
DIASTOLIC BLOOD PRESSURE: 82 MMHG | HEIGHT: 74 IN | HEART RATE: 56 BPM | BODY MASS INDEX: 32.6 KG/M2 | SYSTOLIC BLOOD PRESSURE: 136 MMHG | WEIGHT: 254 LBS

## 2019-11-11 DIAGNOSIS — I10 BENIGN ESSENTIAL HTN: ICD-10-CM

## 2019-11-11 DIAGNOSIS — I25.5 ISCHEMIC CARDIOMYOPATHY: ICD-10-CM

## 2019-11-11 DIAGNOSIS — E78.5 DYSLIPIDEMIA, GOAL LDL BELOW 70: ICD-10-CM

## 2019-11-11 DIAGNOSIS — I25.10 CORONARY ARTERY DISEASE INVOLVING NATIVE CORONARY ARTERY OF NATIVE HEART WITHOUT ANGINA PECTORIS: Primary | ICD-10-CM

## 2019-11-11 DIAGNOSIS — I21.02 ST ELEVATION (STEMI) MYOCARDIAL INFARCTION INVOLVING LEFT ANTERIOR DESCENDING CORONARY ARTERY (H): ICD-10-CM

## 2019-11-11 PROCEDURE — 99214 OFFICE O/P EST MOD 30 MIN: CPT | Performed by: INTERNAL MEDICINE

## 2019-11-11 ASSESSMENT — MIFFLIN-ST. JEOR: SCORE: 2031.89

## 2019-11-11 NOTE — LETTER
11/11/2019    FACUNDO SOLO  [facility] [address]  [place] [address]    RE: Mark Butterfield       Dear Colleague,    I had the pleasure of seeing Mark Butterfield in the [facility].    HPI and Plan:   See dictation    Orders Placed This Encounter   Procedures     Basic metabolic panel     Lipid Profile     ALT     Follow-Up with Cardiologist     Exercise Stress Echocardiogram       Orders Placed This Encounter   Medications     Omega-3 Fatty Acids (FISH OIL PO)     Sig: Take 2 tablets by mouth       There are no discontinued medications.      Encounter Diagnoses   Name Primary?     Ischemic cardiomyopathy      Benign essential HTN      Dyslipidemia, goal LDL below 70      Coronary artery disease involving native coronary artery of native heart without angina pectoris Yes     ST elevation (STEMI) myocardial infarction involving left anterior descending coronary artery (H)        CURRENT MEDICATIONS:  Current Outpatient Medications   Medication Sig Dispense Refill     aspirin (ASA) 81 MG EC tablet Take 1 tablet (81 mg) by mouth daily 90 tablet 2     atorvastatin (LIPITOR) 80 MG tablet Take 1 tablet (80 mg) by mouth At Bedtime 90 tablet 1     carvedilol (COREG) 12.5 MG tablet Take 1 tablet (12.5 mg) by mouth 2 times daily (with meals) 180 tablet 1     lisinopril (PRINIVIL/ZESTRIL) 10 MG tablet Take 1 tablet (10 mg) by mouth daily 90 tablet 0     nitroGLYcerin (NITROSTAT) 0.4 MG sublingual tablet Place 1 tablet (0.4 mg) under the tongue every 5 minutes as needed for chest pain (may repeat x 2) 25 tablet 1     Omega-3 Fatty Acids (FISH OIL PO) Take 2 tablets by mouth         ALLERGIES     Allergies   Allergen Reactions     Penicillins      nausea       PAST MEDICAL HISTORY:  Past Medical History:   Diagnosis Date     Benign essential HTN 1/27/2017     Coronary artery disease involving native coronary artery of native heart without angina pectoris  1/31/2017     Dyslipidemia, goal LDL below 70 1/27/2017     Ischemic cardiomyopathy 1/27/2017     ST elevation (STEMI) myocardial infarction involving left anterior descending coronary artery (H) 1/24/2017       PAST SURGICAL HISTORY:  Past Surgical History:   Procedure Laterality Date     HEART CATH LEFT HEART CATH  01/24/2017    LIZY to mid LAD     HEART CATH LEFT HEART CATH  01/27/2017    LIZY to mid rPDA       FAMILY HISTORY:  Family History   Problem Relation Age of Onset     Coronary Artery Disease Mother      Coronary Artery Disease Father        SOCIAL HISTORY:  Social History     Socioeconomic History     Marital status:      Spouse name: None     Number of children: None     Years of education: None     Highest education level: None   Occupational History     None   Social Needs     Financial resource strain: None     Food insecurity:     Worry: None     Inability: None     Transportation needs:     Medical: None     Non-medical: None   Tobacco Use     Smoking status: Never Smoker     Smokeless tobacco: Never Used   Substance and Sexual Activity     Alcohol use: No     Alcohol/week: 0.0 standard drinks     Drug use: No     Sexual activity: None   Lifestyle     Physical activity:     Days per week: None     Minutes per session: None     Stress: None   Relationships     Social connections:     Talks on phone: None     Gets together: None     Attends Adventism service: None     Active member of club or organization: None     Attends meetings of clubs or organizations: None     Relationship status: None     Intimate partner violence:     Fear of current or ex partner: None     Emotionally abused: None     Physically abused: None     Forced sexual activity: None   Other Topics Concern     Parent/sibling w/ CABG, MI or angioplasty before 65F 55M? No   Social History Narrative     None       Review of Systems:  Skin:  Negative       Eyes:  Negative      ENT:  Negative      Respiratory:  Negative      "  Cardiovascular:  Negative Positive for lightheadedness with quick movements  Gastroenterology: Negative      Genitourinary:  Negative      Musculoskeletal:  Negative      Neurologic:  Negative      Psychiatric:  Negative      Heme/Lymph/Imm:  Negative      Endocrine:  Negative        Physical Exam:  Vitals: /82   Pulse 56   Ht 1.88 m (6' 2\")   Wt 115.2 kg (254 lb)   BMI 32.61 kg/m       Constitutional:  cooperative, alert and oriented, well developed, well nourished, in no acute distress        Skin:  warm and dry to the touch, no apparent skin lesions or masses noted          Head:  normocephalic, no masses or lesions        Eyes:  pupils equal and round, conjunctivae and lids unremarkable, sclera white, no xanthalasma, EOMS intact, no nystagmus        Lymph:      ENT:  no pallor or cyanosis, dentition good        Neck:  carotid pulses are full and equal bilaterally, JVP normal, no carotid bruit        Respiratory:  normal breath sounds, clear to auscultation, normal A-P diameter, normal symmetry, normal respiratory excursion, no use of accessory muscles         Cardiac: regular rhythm, normal S1/S2, no S3 or S4, apical impulse not displaced, no murmurs, gallops or rubs                pulses full and equal, no bruits auscultated                                        GI:  abdomen soft;BS normoactive        Extremities and Muscular Skeletal:  no deformities, clubbing, cyanosis, erythema observed;no edema              Neurological:  no gross motor deficits;affect appropriate        Psych:  affect appropriate, oriented to time, person and place        CC  No referring provider defined for this encounter.                Thank you for allowing me to participate in the care of your patient.      Sincerely,     LISA PARDO MD     [facility]    cc:   No referring provider defined for this encounter.        "

## 2019-11-11 NOTE — PROGRESS NOTES
HPI and Plan:   See dictation    Orders Placed This Encounter   Procedures     Basic metabolic panel     Lipid Profile     ALT     Follow-Up with Cardiologist     Exercise Stress Echocardiogram       Orders Placed This Encounter   Medications     Omega-3 Fatty Acids (FISH OIL PO)     Sig: Take 2 tablets by mouth       There are no discontinued medications.      Encounter Diagnoses   Name Primary?     Ischemic cardiomyopathy      Benign essential HTN      Dyslipidemia, goal LDL below 70      Coronary artery disease involving native coronary artery of native heart without angina pectoris Yes     ST elevation (STEMI) myocardial infarction involving left anterior descending coronary artery (H)        CURRENT MEDICATIONS:  Current Outpatient Medications   Medication Sig Dispense Refill     aspirin (ASA) 81 MG EC tablet Take 1 tablet (81 mg) by mouth daily 90 tablet 2     atorvastatin (LIPITOR) 80 MG tablet Take 1 tablet (80 mg) by mouth At Bedtime 90 tablet 1     carvedilol (COREG) 12.5 MG tablet Take 1 tablet (12.5 mg) by mouth 2 times daily (with meals) 180 tablet 1     lisinopril (PRINIVIL/ZESTRIL) 10 MG tablet Take 1 tablet (10 mg) by mouth daily 90 tablet 0     nitroGLYcerin (NITROSTAT) 0.4 MG sublingual tablet Place 1 tablet (0.4 mg) under the tongue every 5 minutes as needed for chest pain (may repeat x 2) 25 tablet 1     Omega-3 Fatty Acids (FISH OIL PO) Take 2 tablets by mouth         ALLERGIES     Allergies   Allergen Reactions     Penicillins      nausea       PAST MEDICAL HISTORY:  Past Medical History:   Diagnosis Date     Benign essential HTN 1/27/2017     Coronary artery disease involving native coronary artery of native heart without angina pectoris 1/31/2017     Dyslipidemia, goal LDL below 70 1/27/2017     Ischemic cardiomyopathy 1/27/2017     ST elevation (STEMI) myocardial infarction involving left anterior descending coronary artery (H) 1/24/2017       PAST SURGICAL HISTORY:  Past Surgical History:    Procedure Laterality Date     HEART CATH LEFT HEART CATH  01/24/2017    LIZY to mid LAD     HEART CATH LEFT HEART CATH  01/27/2017    LIZY to mid rPDA       FAMILY HISTORY:  Family History   Problem Relation Age of Onset     Coronary Artery Disease Mother      Coronary Artery Disease Father        SOCIAL HISTORY:  Social History     Socioeconomic History     Marital status:      Spouse name: None     Number of children: None     Years of education: None     Highest education level: None   Occupational History     None   Social Needs     Financial resource strain: None     Food insecurity:     Worry: None     Inability: None     Transportation needs:     Medical: None     Non-medical: None   Tobacco Use     Smoking status: Never Smoker     Smokeless tobacco: Never Used   Substance and Sexual Activity     Alcohol use: No     Alcohol/week: 0.0 standard drinks     Drug use: No     Sexual activity: None   Lifestyle     Physical activity:     Days per week: None     Minutes per session: None     Stress: None   Relationships     Social connections:     Talks on phone: None     Gets together: None     Attends Sabianist service: None     Active member of club or organization: None     Attends meetings of clubs or organizations: None     Relationship status: None     Intimate partner violence:     Fear of current or ex partner: None     Emotionally abused: None     Physically abused: None     Forced sexual activity: None   Other Topics Concern     Parent/sibling w/ CABG, MI or angioplasty before 65F 55M? No   Social History Narrative     None       Review of Systems:  Skin:  Negative       Eyes:  Negative      ENT:  Negative      Respiratory:  Negative       Cardiovascular:  Negative Positive for lightheadedness with quick movements  Gastroenterology: Negative      Genitourinary:  Negative      Musculoskeletal:  Negative      Neurologic:  Negative      Psychiatric:  Negative      Heme/Lymph/Imm:  Negative     "  Endocrine:  Negative        Physical Exam:  Vitals: /82   Pulse 56   Ht 1.88 m (6' 2\")   Wt 115.2 kg (254 lb)   BMI 32.61 kg/m      Constitutional:  cooperative, alert and oriented, well developed, well nourished, in no acute distress        Skin:  warm and dry to the touch, no apparent skin lesions or masses noted          Head:  normocephalic, no masses or lesions        Eyes:  pupils equal and round, conjunctivae and lids unremarkable, sclera white, no xanthalasma, EOMS intact, no nystagmus        Lymph:      ENT:  no pallor or cyanosis, dentition good        Neck:  carotid pulses are full and equal bilaterally, JVP normal, no carotid bruit        Respiratory:  normal breath sounds, clear to auscultation, normal A-P diameter, normal symmetry, normal respiratory excursion, no use of accessory muscles         Cardiac: regular rhythm, normal S1/S2, no S3 or S4, apical impulse not displaced, no murmurs, gallops or rubs                pulses full and equal, no bruits auscultated                                        GI:  abdomen soft;BS normoactive        Extremities and Muscular Skeletal:  no deformities, clubbing, cyanosis, erythema observed;no edema              Neurological:  no gross motor deficits;affect appropriate        Psych:  affect appropriate, oriented to time, person and place        CC  No referring provider defined for this encounter.              "

## 2019-11-11 NOTE — PROGRESS NOTES
Service Date: 2019      Chaz Contreras MD   [facility]    [address] [place] [address]      RE: Mark Butterfield    MRN: [id number]   : 1960      Dear Dr. Contreras:       I had the opportunity to see Mr. Mark Butterfield in Cardiology Clinic today at the [facility] in [place] for reevaluation of coronary artery disease, myocardial infarction and ischemic cardiomyopathy.  Mr. Butterfield presented with chest pain and acute ST elevation myocardial infarction in 2017.  He underwent emergent coronary angiography and stenting of the LAD and diagonal arteries.  He then came back a couple days later for stenting of the posterior descending coronary artery.  Since then, he has had mild left ventricular dysfunction due to residual wall motion abnormality involving the distal anterior wall and apex.  This year, his echocardiogram indicates an ejection fraction of 48%, indicating mild left ventricular dysfunction.  He has no valvular abnormalities.  He is feeling well and has no concerning cardiac symptoms, such as chest pain, shortness of breath, palpitations, lightheadedness or syncope.      His cardiac risk factors include hypertension and dyslipidemia, which are well treated with medical therapy.  He had some elevated liver function tests in the past, but we were able to continue his atorvastatin 80 mg daily, and his liver functions have normalized.  He admits that he has not been exercising consistently and has gained a little bit of weight.  Otherwise, he seems to be doing well.      PHYSICAL EXAMINATION:  Today his blood pressure is 136/82, but it usually runs about 115/80.  Heart rate is 56, and weight is 254 pounds.  His lungs are clear.  Heart rhythm is regular.  There are no cardiac murmurs or carotid bruits.      IMPRESSIONS:  Mr. Mark Butterfield is a 59-year-old gentleman with hypertension and dyslipidemia who had an ST elevation myocardial  infarction in 2017, treated emergently with stenting of his LAD and diagonal arteries and PDA a few days later.  He is now doing well with good risk factor control.  I urged him to continue his medications without change.  I also urged him to exercise more consistently.  I recommended 30 minutes daily and to maintain consistency with that.  I will plan to see him back again in 1 year and repeat some stress testing with a stress echocardiogram at that time.      Sincerely,      Tomas Joe MD, FACC         TOMAS JOE MD, FACC             D: 2019   T: 2019   MT: vivienne      Name:     MARIAN GASPAR   MRN:      [id number]        Account:      [id number]   :      1960           Service Date: 2019      Document: [id number]

## 2019-11-11 NOTE — LETTER
2019      CHAZ SOLO  [facility] [address]  [place] [address]      RE: Mark Butterfield       Dear Colleague,    I had the pleasure of seeing Mark Butterfield in the [facility].    Service Date: 2019      Chaz Solo MD   [facility]    [address] [place] [address]      RE: Mark Butterfield    MRN: [id number]   : 1960      Dear Dr. Solo:       I had the opportunity to see Mr. Mark Butterfield in Cardiology Clinic today at the [facility] in [place] for reevaluation of coronary artery disease, myocardial infarction and ischemic cardiomyopathy.  Mr. Butterfield presented with chest pain and acute ST elevation myocardial infarction in .  He underwent emergent coronary angiography and stenting of the LAD and diagonal arteries.  He then came back a couple days later for stenting of the posterior descending coronary artery.  Since then, he has had mild left ventricular dysfunction due to residual wall motion abnormality involving the distal anterior wall and apex.  This year, his echocardiogram indicates an ejection fraction of 48%, indicating mild left ventricular dysfunction.  He has no valvular abnormalities.  He is feeling well and has no concerning cardiac symptoms, such as chest pain, shortness of breath, palpitations, lightheadedness or syncope.      His cardiac risk factors include hypertension and dyslipidemia, which are well treated with medical therapy.  He had some elevated liver function tests in the past, but we were able to continue his atorvastatin 80 mg daily, and his liver functions have normalized.  He admits that he has not been exercising consistently and has gained a little bit of weight.  Otherwise, he seems to be doing well.      PHYSICAL EXAMINATION:  Today his blood pressure is 136/82, but it usually runs about 115/80.  Heart rate is 56, and  weight is 254 pounds.  His lungs are clear.  Heart rhythm is regular.  There are no cardiac murmurs or carotid bruits.      IMPRESSIONS:  Mr. Marian Butterfield is a 59-year-old gentleman with hypertension and dyslipidemia who had an ST elevation myocardial infarction in 2017, treated emergently with stenting of his LAD and diagonal arteries and PDA a few days later.  He is now doing well with good risk factor control.  I urged him to continue his medications without change.  I also urged him to exercise more consistently.  I recommended 30 minutes daily and to maintain consistency with that.  I will plan to see him back again in 1 year and repeat some stress testing with a stress echocardiogram at that time.      Sincerely,      Lisa Joe MD, FACC         LISA JOE MD, [facility]             D: 2019   T: 2019   MT: vivienne      Name:     MARIAN BUTTERFIELD   MRN:      [id number]        Account:      [id number]   :      1960           Service Date: 2019      Document: [id number]         Outpatient Encounter Medications as of 2019   Medication Sig Dispense Refill     aspirin (ASA) 81 MG EC tablet Take 1 tablet (81 mg) by mouth daily 90 tablet 2     atorvastatin (LIPITOR) 80 MG tablet Take 1 tablet (80 mg) by mouth At Bedtime 90 tablet 1     carvedilol (COREG) 12.5 MG tablet Take 1 tablet (12.5 mg) by mouth 2 times daily (with meals) 180 tablet 1     lisinopril (PRINIVIL/ZESTRIL) 10 MG tablet Take 1 tablet (10 mg) by mouth daily 90 tablet 0     nitroGLYcerin (NITROSTAT) 0.4 MG sublingual tablet Place 1 tablet (0.4 mg) under the tongue every 5 minutes as needed for chest pain (may repeat x 2) 25 tablet 1     Omega-3 Fatty Acids (FISH OIL PO) Take 2 tablets by mouth       No facility-administered encounter medications on file as of 2019.        Again, thank you for allowing me to participate in the care of your patient.      Sincerely,    LISA JOE MD     [facility]  [facility]

## 2019-12-26 DIAGNOSIS — I25.5 ISCHEMIC CARDIOMYOPATHY: ICD-10-CM

## 2019-12-26 DIAGNOSIS — I21.3 ST ELEVATION MYOCARDIAL INFARCTION (STEMI), UNSPECIFIED ARTERY (H): ICD-10-CM

## 2019-12-26 DIAGNOSIS — I10 BENIGN ESSENTIAL HTN: ICD-10-CM

## 2019-12-26 RX ORDER — LISINOPRIL 10 MG/1
10 TABLET ORAL DAILY
Qty: 90 TABLET | Refills: 2 | Status: SHIPPED | OUTPATIENT
Start: 2019-12-26 | End: 2020-12-18

## 2020-01-27 DIAGNOSIS — I21.3 ST ELEVATION MYOCARDIAL INFARCTION (STEMI), UNSPECIFIED ARTERY (H): ICD-10-CM

## 2020-03-03 DIAGNOSIS — E78.5 DYSLIPIDEMIA, GOAL LDL BELOW 70: ICD-10-CM

## 2020-03-03 DIAGNOSIS — I10 BENIGN ESSENTIAL HTN: ICD-10-CM

## 2020-03-03 DIAGNOSIS — I25.5 ISCHEMIC CARDIOMYOPATHY: ICD-10-CM

## 2020-03-03 RX ORDER — CARVEDILOL 12.5 MG/1
12.5 TABLET ORAL 2 TIMES DAILY WITH MEALS
Qty: 180 TABLET | Refills: 2 | Status: SHIPPED | OUTPATIENT
Start: 2020-03-03 | End: 2020-12-18

## 2020-03-03 RX ORDER — ATORVASTATIN CALCIUM 80 MG/1
80 TABLET, FILM COATED ORAL AT BEDTIME
Qty: 90 TABLET | Refills: 2 | Status: SHIPPED | OUTPATIENT
Start: 2020-03-03 | End: 2020-12-18

## 2020-03-03 NOTE — TELEPHONE ENCOUNTER
Refill request from [facility] for Carvedilol 12.5mg. LOV 11/11/19 with orders for one year follow-up. 90 day supply with 2 refills sent. PERRY Ortiz RN.

## 2020-12-17 DIAGNOSIS — I21.3 ST ELEVATION MYOCARDIAL INFARCTION (STEMI), UNSPECIFIED ARTERY (H): ICD-10-CM

## 2020-12-18 ENCOUNTER — CARE COORDINATION (OUTPATIENT)
Dept: CARDIOLOGY | Facility: CLINIC | Age: 60
End: 2020-12-18

## 2020-12-18 DIAGNOSIS — I25.5 ISCHEMIC CARDIOMYOPATHY: ICD-10-CM

## 2020-12-18 DIAGNOSIS — E78.5 DYSLIPIDEMIA, GOAL LDL BELOW 70: ICD-10-CM

## 2020-12-18 DIAGNOSIS — I21.3 ST ELEVATION MYOCARDIAL INFARCTION (STEMI), UNSPECIFIED ARTERY (H): ICD-10-CM

## 2020-12-18 DIAGNOSIS — I10 BENIGN ESSENTIAL HTN: ICD-10-CM

## 2020-12-18 RX ORDER — CARVEDILOL 12.5 MG/1
12.5 TABLET ORAL 2 TIMES DAILY WITH MEALS
Qty: 180 TABLET | Refills: 0 | Status: SHIPPED | OUTPATIENT
Start: 2020-12-18 | End: 2021-03-05

## 2020-12-18 RX ORDER — ATORVASTATIN CALCIUM 80 MG/1
80 TABLET, FILM COATED ORAL AT BEDTIME
Qty: 90 TABLET | Refills: 0 | Status: SHIPPED | OUTPATIENT
Start: 2020-12-18 | End: 2021-03-05

## 2020-12-18 RX ORDER — LISINOPRIL 10 MG/1
10 TABLET ORAL DAILY
Qty: 90 TABLET | Refills: 0 | Status: SHIPPED | OUTPATIENT
Start: 2020-12-18 | End: 2021-03-05

## 2021-02-19 DIAGNOSIS — I10 BENIGN ESSENTIAL HTN: Primary | ICD-10-CM

## 2021-02-19 DIAGNOSIS — E78.5 DYSLIPIDEMIA, GOAL LDL BELOW 70: ICD-10-CM

## 2021-02-19 DIAGNOSIS — I25.10 CORONARY ARTERY DISEASE INVOLVING NATIVE CORONARY ARTERY OF NATIVE HEART WITHOUT ANGINA PECTORIS: Primary | ICD-10-CM

## 2021-03-03 ENCOUNTER — HOSPITAL ENCOUNTER (OUTPATIENT)
Dept: CARDIOLOGY | Facility: CLINIC | Age: 61
Discharge: HOME | End: 2021-03-03
Attending: INTERNAL MEDICINE | Admitting: INTERNAL MEDICINE
Payer: COMMERCIAL

## 2021-03-03 DIAGNOSIS — I10 BENIGN ESSENTIAL HTN: ICD-10-CM

## 2021-03-03 DIAGNOSIS — I25.10 CORONARY ARTERY DISEASE INVOLVING NATIVE CORONARY ARTERY OF NATIVE HEART WITHOUT ANGINA PECTORIS: ICD-10-CM

## 2021-03-03 DIAGNOSIS — E78.5 DYSLIPIDEMIA, GOAL LDL BELOW 70: ICD-10-CM

## 2021-03-03 LAB
ALT SERPL W P-5'-P-CCNC: 32 U/L (ref 0–70)
ANION GAP SERPL CALCULATED.3IONS-SCNC: 4 MMOL/L (ref 3–14)
BUN SERPL-MCNC: 18 MG/DL (ref 7–30)
CALCIUM SERPL-MCNC: 9.2 MG/DL (ref 8.5–10.1)
CHLORIDE SERPL-SCNC: 109 MMOL/L (ref 94–109)
CHOLEST SERPL-MCNC: 144 MG/DL
CO2 SERPL-SCNC: 27 MMOL/L (ref 20–32)
CREAT SERPL-MCNC: 1.21 MG/DL (ref 0.66–1.25)
GFR SERPL CREATININE-BSD FRML MDRD: 64 ML/MIN/{1.73_M2}
GLUCOSE SERPL-MCNC: 106 MG/DL (ref 70–99)
HDLC SERPL-MCNC: 40 MG/DL
LDLC SERPL CALC-MCNC: 83 MG/DL
NONHDLC SERPL-MCNC: 104 MG/DL
POTASSIUM SERPL-SCNC: 4.1 MMOL/L (ref 3.4–5.3)
SODIUM SERPL-SCNC: 140 MMOL/L (ref 133–144)
TRIGL SERPL-MCNC: 105 MG/DL

## 2021-03-03 PROCEDURE — 93325 DOPPLER ECHO COLOR FLOW MAPG: CPT | Mod: 26 | Performed by: INTERNAL MEDICINE

## 2021-03-03 PROCEDURE — 93016 CV STRESS TEST SUPVJ ONLY: CPT | Performed by: INTERNAL MEDICINE

## 2021-03-03 PROCEDURE — 93325 DOPPLER ECHO COLOR FLOW MAPG: CPT | Mod: TC

## 2021-03-03 PROCEDURE — 84460 ALANINE AMINO (ALT) (SGPT): CPT | Performed by: INTERNAL MEDICINE

## 2021-03-03 PROCEDURE — 93018 CV STRESS TEST I&R ONLY: CPT | Performed by: INTERNAL MEDICINE

## 2021-03-03 PROCEDURE — 36415 COLL VENOUS BLD VENIPUNCTURE: CPT | Performed by: INTERNAL MEDICINE

## 2021-03-03 PROCEDURE — 93321 DOPPLER ECHO F-UP/LMTD STD: CPT | Mod: 26 | Performed by: INTERNAL MEDICINE

## 2021-03-03 PROCEDURE — 80061 LIPID PANEL: CPT | Performed by: INTERNAL MEDICINE

## 2021-03-03 PROCEDURE — 255N000002 HC RX 255 OP 636: Performed by: INTERNAL MEDICINE

## 2021-03-03 PROCEDURE — 93350 STRESS TTE ONLY: CPT | Mod: 26 | Performed by: INTERNAL MEDICINE

## 2021-03-03 PROCEDURE — 80048 BASIC METABOLIC PNL TOTAL CA: CPT | Performed by: INTERNAL MEDICINE

## 2021-03-03 RX ADMIN — HUMAN ALBUMIN MICROSPHERES AND PERFLUTREN 5 ML: 10; .22 INJECTION, SOLUTION INTRAVENOUS at 09:01

## 2021-03-05 ENCOUNTER — OFFICE VISIT (OUTPATIENT)
Dept: CARDIOLOGY | Facility: CLINIC | Age: 61
End: 2021-03-05
Payer: COMMERCIAL

## 2021-03-05 VITALS
DIASTOLIC BLOOD PRESSURE: 68 MMHG | WEIGHT: 261 LBS | SYSTOLIC BLOOD PRESSURE: 103 MMHG | HEART RATE: 68 BPM | HEIGHT: 74 IN | BODY MASS INDEX: 33.5 KG/M2

## 2021-03-05 DIAGNOSIS — I21.3 ST ELEVATION MYOCARDIAL INFARCTION (STEMI), UNSPECIFIED ARTERY (H): ICD-10-CM

## 2021-03-05 DIAGNOSIS — I25.5 ISCHEMIC CARDIOMYOPATHY: ICD-10-CM

## 2021-03-05 DIAGNOSIS — I10 BENIGN ESSENTIAL HTN: ICD-10-CM

## 2021-03-05 DIAGNOSIS — E78.5 DYSLIPIDEMIA, GOAL LDL BELOW 70: ICD-10-CM

## 2021-03-05 PROCEDURE — 99214 OFFICE O/P EST MOD 30 MIN: CPT | Performed by: INTERNAL MEDICINE

## 2021-03-05 RX ORDER — LISINOPRIL 5 MG/1
5 TABLET ORAL DAILY
Qty: 90 TABLET | Refills: 3 | Status: SHIPPED | OUTPATIENT
Start: 2021-03-05 | End: 2022-03-04

## 2021-03-05 RX ORDER — NITROGLYCERIN 0.4 MG/1
0.4 TABLET SUBLINGUAL EVERY 5 MIN PRN
Qty: 25 TABLET | Refills: 1 | Status: SHIPPED | OUTPATIENT
Start: 2021-03-05 | End: 2022-04-18

## 2021-03-05 RX ORDER — ATORVASTATIN CALCIUM 80 MG/1
80 TABLET, FILM COATED ORAL AT BEDTIME
Qty: 90 TABLET | Refills: 3 | Status: SHIPPED | OUTPATIENT
Start: 2021-03-05 | End: 2022-03-04

## 2021-03-05 RX ORDER — LISINOPRIL 10 MG/1
10 TABLET ORAL DAILY
Qty: 90 TABLET | Refills: 3 | Status: CANCELLED | OUTPATIENT
Start: 2021-03-05

## 2021-03-05 RX ORDER — CARVEDILOL 12.5 MG/1
12.5 TABLET ORAL 2 TIMES DAILY WITH MEALS
Qty: 180 TABLET | Refills: 3 | Status: SHIPPED | OUTPATIENT
Start: 2021-03-05 | End: 2022-03-04

## 2021-03-05 ASSESSMENT — MIFFLIN-ST. JEOR: SCORE: 2058.64

## 2021-03-05 NOTE — PROGRESS NOTES
HPI and Plan:   See dictation    Orders Placed This Encounter   Procedures     Lipid Profile     ALT     Basic metabolic panel     Lipid Profile     Follow-Up with Cardiologist       Orders Placed This Encounter   Medications     atorvastatin (LIPITOR) 80 MG tablet     Sig: Take 1 tablet (80 mg) by mouth At Bedtime     Dispense:  90 tablet     Refill:  3     carvedilol (COREG) 12.5 MG tablet     Sig: Take 1 tablet (12.5 mg) by mouth 2 times daily (with meals)     Dispense:  180 tablet     Refill:  3     nitroGLYcerin (NITROSTAT) 0.4 MG sublingual tablet     Sig: Place 1 tablet (0.4 mg) under the tongue every 5 minutes as needed for chest pain (may repeat x 2)     Dispense:  25 tablet     Refill:  1     lisinopril (ZESTRIL) 5 MG tablet     Sig: Take 1 tablet (5 mg) by mouth daily     Dispense:  90 tablet     Refill:  3       Medications Discontinued During This Encounter   Medication Reason     Omega-3 Fatty Acids (FISH OIL PO)      nitroGLYcerin (NITROSTAT) 0.4 MG sublingual tablet Reorder     atorvastatin (LIPITOR) 80 MG tablet Reorder     carvedilol (COREG) 12.5 MG tablet Reorder     lisinopril (ZESTRIL) 10 MG tablet          Encounter Diagnoses   Name Primary?     Dyslipidemia, goal LDL below 70      Ischemic cardiomyopathy      Benign essential HTN      ST elevation myocardial infarction (STEMI), unspecified artery (H)        CURRENT MEDICATIONS:  Current Outpatient Medications   Medication Sig Dispense Refill     aspirin (ASA) 81 MG EC tablet Take 1 tablet (81 mg) by mouth daily 90 tablet 0     atorvastatin (LIPITOR) 80 MG tablet Take 1 tablet (80 mg) by mouth At Bedtime 90 tablet 3     carvedilol (COREG) 12.5 MG tablet Take 1 tablet (12.5 mg) by mouth 2 times daily (with meals) 180 tablet 3     lisinopril (ZESTRIL) 5 MG tablet Take 1 tablet (5 mg) by mouth daily 90 tablet 3     nitroGLYcerin (NITROSTAT) 0.4 MG sublingual tablet Place 1 tablet (0.4 mg) under the tongue every 5 minutes as needed for chest pain  (may repeat x 2) 25 tablet 1       ALLERGIES     Allergies   Allergen Reactions     Penicillins      nausea       PAST MEDICAL HISTORY:  Past Medical History:   Diagnosis Date     Benign essential HTN 1/27/2017     Coronary artery disease involving native coronary artery of native heart without angina pectoris 1/31/2017     Dyslipidemia, goal LDL below 70 1/27/2017     Ischemic cardiomyopathy 1/27/2017     ST elevation (STEMI) myocardial infarction involving left anterior descending coronary artery (H) 1/24/2017       PAST SURGICAL HISTORY:  Past Surgical History:   Procedure Laterality Date     HEART CATH LEFT HEART CATH  01/24/2017    LIZY to mid LAD     HEART CATH LEFT HEART CATH  01/27/2017    LIZY to mid rPDA       FAMILY HISTORY:  Family History   Problem Relation Age of Onset     Coronary Artery Disease Mother      Coronary Artery Disease Father        SOCIAL HISTORY:  Social History     Socioeconomic History     Marital status:      Spouse name: None     Number of children: None     Years of education: None     Highest education level: None   Occupational History     None   Social Needs     Financial resource strain: None     Food insecurity     Worry: None     Inability: None     Transportation needs     Medical: None     Non-medical: None   Tobacco Use     Smoking status: Never Smoker     Smokeless tobacco: Never Used   Substance and Sexual Activity     Alcohol use: No     Alcohol/week: 0.0 standard drinks     Drug use: No     Sexual activity: None   Lifestyle     Physical activity     Days per week: None     Minutes per session: None     Stress: None   Relationships     Social connections     Talks on phone: None     Gets together: None     Attends Zoroastrianism service: None     Active member of club or organization: None     Attends meetings of clubs or organizations: None     Relationship status: None     Intimate partner violence     Fear of current or ex partner: None     Emotionally abused: None     " Physically abused: None     Forced sexual activity: None   Other Topics Concern     Parent/sibling w/ CABG, MI or angioplasty before 65F 55M? No   Social History Narrative     None       Review of Systems:  Skin:  Negative       Eyes:  Negative      ENT:  Negative      Respiratory:  Negative       Cardiovascular:  Negative;palpitations;chest pain;syncope or near-syncope;cyanosis;fatigue;edema;exercise intolerance Positive for;dizziness lightheadedness when bending over  Gastroenterology: Negative      Genitourinary:  Negative      Musculoskeletal:  Negative      Neurologic:  Negative      Psychiatric:  Negative      Heme/Lymph/Imm:  Negative      Endocrine:  Negative        Physical Exam:  Vitals: /68   Pulse 68   Ht 1.88 m (6' 2\")   Wt 118.4 kg (261 lb)   BMI 33.51 kg/m      Constitutional:  cooperative, alert and oriented, well developed, well nourished, in no acute distress        Skin:  warm and dry to the touch, no apparent skin lesions or masses noted          Head:  normocephalic, no masses or lesions        Eyes:  pupils equal and round, conjunctivae and lids unremarkable, sclera white, no xanthalasma, EOMS intact, no nystagmus        Lymph:      ENT:  no pallor or cyanosis, dentition good        Neck:  carotid pulses are full and equal bilaterally, JVP normal, no carotid bruit        Respiratory:  normal breath sounds, clear to auscultation, normal A-P diameter, normal symmetry, normal respiratory excursion, no use of accessory muscles         Cardiac: regular rhythm, normal S1/S2, no S3 or S4, apical impulse not displaced, no murmurs, gallops or rubs                pulses full and equal, no bruits auscultated                                        GI:  abdomen soft;BS normoactive        Extremities and Muscular Skeletal:  no deformities, clubbing, cyanosis, erythema observed;no edema              Neurological:  no gross motor deficits;affect appropriate        Psych:  affect appropriate, " oriented to time, person and place        CC  No referring provider defined for this encounter.

## 2021-03-05 NOTE — LETTER
3/5/2021    Physician No Ref-Primary  No address on file    RE: Mark Butterfield       Dear Colleague,    I had the pleasure of seeing Mark Butterfield in the [facility].    HPI and Plan:   See dictation    Orders Placed This Encounter   Procedures     Lipid Profile     ALT     Basic metabolic panel     Lipid Profile     Follow-Up with Cardiologist       Orders Placed This Encounter   Medications     atorvastatin (LIPITOR) 80 MG tablet     Sig: Take 1 tablet (80 mg) by mouth At Bedtime     Dispense:  90 tablet     Refill:  3     carvedilol (COREG) 12.5 MG tablet     Sig: Take 1 tablet (12.5 mg) by mouth 2 times daily (with meals)     Dispense:  180 tablet     Refill:  3     nitroGLYcerin (NITROSTAT) 0.4 MG sublingual tablet     Sig: Place 1 tablet (0.4 mg) under the tongue every 5 minutes as needed for chest pain (may repeat x 2)     Dispense:  25 tablet     Refill:  1     lisinopril (ZESTRIL) 5 MG tablet     Sig: Take 1 tablet (5 mg) by mouth daily     Dispense:  90 tablet     Refill:  3       Medications Discontinued During This Encounter   Medication Reason     Omega-3 Fatty Acids (FISH OIL PO)      nitroGLYcerin (NITROSTAT) 0.4 MG sublingual tablet Reorder     atorvastatin (LIPITOR) 80 MG tablet Reorder     carvedilol (COREG) 12.5 MG tablet Reorder     lisinopril (ZESTRIL) 10 MG tablet          Encounter Diagnoses   Name Primary?     Dyslipidemia, goal LDL below 70      Ischemic cardiomyopathy      Benign essential HTN      ST elevation myocardial infarction (STEMI), unspecified artery (H)        CURRENT MEDICATIONS:  Current Outpatient Medications   Medication Sig Dispense Refill     aspirin (ASA) 81 MG EC tablet Take 1 tablet (81 mg) by mouth daily 90 tablet 0     atorvastatin (LIPITOR) 80 MG tablet Take 1 tablet (80 mg) by mouth At Bedtime 90 tablet 3     carvedilol (COREG) 12.5 MG tablet Take 1 tablet (12.5 mg) by mouth 2 times daily (with meals)  180 tablet 3     lisinopril (ZESTRIL) 5 MG tablet Take 1 tablet (5 mg) by mouth daily 90 tablet 3     nitroGLYcerin (NITROSTAT) 0.4 MG sublingual tablet Place 1 tablet (0.4 mg) under the tongue every 5 minutes as needed for chest pain (may repeat x 2) 25 tablet 1       ALLERGIES     Allergies   Allergen Reactions     Penicillins      nausea       PAST MEDICAL HISTORY:  Past Medical History:   Diagnosis Date     Benign essential HTN 1/27/2017     Coronary artery disease involving native coronary artery of native heart without angina pectoris 1/31/2017     Dyslipidemia, goal LDL below 70 1/27/2017     Ischemic cardiomyopathy 1/27/2017     ST elevation (STEMI) myocardial infarction involving left anterior descending coronary artery (H) 1/24/2017       PAST SURGICAL HISTORY:  Past Surgical History:   Procedure Laterality Date     HEART CATH LEFT HEART CATH  01/24/2017    LIZY to mid LAD     HEART CATH LEFT HEART CATH  01/27/2017    LIZY to mid rPDA       FAMILY HISTORY:  Family History   Problem Relation Age of Onset     Coronary Artery Disease Mother      Coronary Artery Disease Father        SOCIAL HISTORY:  Social History     Socioeconomic History     Marital status:      Spouse name: None     Number of children: None     Years of education: None     Highest education level: None   Occupational History     None   Social Needs     Financial resource strain: None     Food insecurity     Worry: None     Inability: None     Transportation needs     Medical: None     Non-medical: None   Tobacco Use     Smoking status: Never Smoker     Smokeless tobacco: Never Used   Substance and Sexual Activity     Alcohol use: No     Alcohol/week: 0.0 standard drinks     Drug use: No     Sexual activity: None   Lifestyle     Physical activity     Days per week: None     Minutes per session: None     Stress: None   Relationships     Social connections     Talks on phone: None     Gets together: None     Attends Oriental orthodox service:  "None     Active member of club or organization: None     Attends meetings of clubs or organizations: None     Relationship status: None     Intimate partner violence     Fear of current or ex partner: None     Emotionally abused: None     Physically abused: None     Forced sexual activity: None   Other Topics Concern     Parent/sibling w/ CABG, MI or angioplasty before 65F 55M? No   Social History Narrative     None       Review of Systems:  Skin:  Negative       Eyes:  Negative      ENT:  Negative      Respiratory:  Negative       Cardiovascular:  Negative;palpitations;chest pain;syncope or near-syncope;cyanosis;fatigue;edema;exercise intolerance Positive for;dizziness lightheadedness when bending over  Gastroenterology: Negative      Genitourinary:  Negative      Musculoskeletal:  Negative      Neurologic:  Negative      Psychiatric:  Negative      Heme/Lymph/Imm:  Negative      Endocrine:  Negative        Physical Exam:  Vitals: /68   Pulse 68   Ht 1.88 m (6' 2\")   Wt 118.4 kg (261 lb)   BMI 33.51 kg/m      Constitutional:  cooperative, alert and oriented, well developed, well nourished, in no acute distress        Skin:  warm and dry to the touch, no apparent skin lesions or masses noted          Head:  normocephalic, no masses or lesions        Eyes:  pupils equal and round, conjunctivae and lids unremarkable, sclera white, no xanthalasma, EOMS intact, no nystagmus        Lymph:      ENT:  no pallor or cyanosis, dentition good        Neck:  carotid pulses are full and equal bilaterally, JVP normal, no carotid bruit        Respiratory:  normal breath sounds, clear to auscultation, normal A-P diameter, normal symmetry, normal respiratory excursion, no use of accessory muscles         Cardiac: regular rhythm, normal S1/S2, no S3 or S4, apical impulse not displaced, no murmurs, gallops or rubs                pulses full and equal, no bruits auscultated                                        GI:  abdomen " soft;BS normoactive        Extremities and Muscular Skeletal:  no deformities, clubbing, cyanosis, erythema observed;no edema              Neurological:  no gross motor deficits;affect appropriate        Psych:  affect appropriate, oriented to time, person and place        CC  No referring provider defined for this encounter.                Service Date: 03/05/2021      HISTORY OF PRESENT ILLNESS:  I had the opportunity to see Mr. Mark Butterfield in Cardiology Clinic today at [facility] in [place] for reevaluation of coronary artery disease, myocardial infarction, ischemic cardiomyopathy.  Mr. Butterfield is a 60-year-old gentleman with a history of anterior wall myocardial infarction in 2017.  He underwent emergency coronary angiography and stenting of his LAD and diagonal arteries, then came back a few weeks later for stenting of his posterior descending coronary artery.  He has had residual distal LAD territory wall motion abnormality since that infarction with mildly reduced left ventricular function.      This year he completed a stress echocardiogram prior to my visit.  That study showed a small area of distal anterior and apical hypokinesis with mildly reduced left ventricular systolic function.  His ejection fraction was 45%-50%.  He exercised for 10 minutes without chest pain on the treadmill and had no ECG changes.  There were no new wall motion abnormalities identified.  His left ventricular function improved appropriately.  There was no evidence for ischemia.      He has been feeling well.  He has been exercising on his Pel[employer] bike consistently without any concerning cardiac symptoms.  He has not been eating as well.  He admits that he has been snacking more and drinking some sugared soda.  He has been eating vega, eggs and hash browns for breakfast once a week.  As a result, his weight is up about 7 pounds since last year and 16 pounds since 2 years ago.  His cholesterol numbers are up as  well.  His LDL is up to 83 from 77 and HDL is stable at 40.  His triglycerides are normal.      Otherwise, he has been feeling well without any concerning cardiac symptoms.  He has occasional mild lightheadedness while standing, but that has not caused him to fall or have syncope.      PHYSICAL EXAMINATION:  Today his blood pressure is 103/68, heart rate 68 and weight 261 pounds.  His lungs are clear.  His heart rhythm is regular.  He has no cardiac murmurs or carotid bruits.      IMPRESSIONS:  Mr. Marian Butterfield is a 60-year-old gentleman with hypertension, dyslipidemia and history of anterior wall myocardial infarction in 2017.  He underwent stenting of his LAD, diagonal and posterior descending coronary arteries at that time.  He has mild left ventricular dysfunction, now with a small residual wall motion abnormality present.      He has been exercising well but not eating as well as he should.  He is not having any concerning cardiac symptoms and his stress test looks normal aside from his prior infarction.  I urged him to eat better and try to improve his weight and cholesterol numbers.  I will recheck his cholesterol in 3 months and consider adding Zetia 10 mg a day at that time if his LDL remains above 70.  Otherwise, I will plan to see him back again in 1 year for reevaluation.         LISA PARDO MD, [facility]             D: 2021   T: 2021   MT: [email or web address]      Name:     MARIAN BUTTERFIELD   MRN:      [id number]        Account:      [id number]   :      1960           Service Date: 2021      Document: [id number]        Thank you for allowing me to participate in the care of your patient.      Sincerely,     LISA PARDO MD     [facility]  cc:   No referring provider defined for this encounter.

## 2021-03-05 NOTE — LETTER
Date:April 19, 2021      Patient was self referred, no letter generated. Do not send.        [facility] Information

## 2021-03-05 NOTE — PROGRESS NOTES
Service Date: 03/05/2021      HISTORY OF PRESENT ILLNESS:  I had the opportunity to see Mr. Mark Butterfield in Cardiology Clinic today at [facility] in [place] for reevaluation of coronary artery disease, myocardial infarction, ischemic cardiomyopathy.  Mr. Butterfield is a 60-year-old gentleman with a history of anterior wall myocardial infarction in 2017.  He underwent emergency coronary angiography and stenting of his LAD and diagonal arteries, then came back a few weeks later for stenting of his posterior descending coronary artery.  He has had residual distal LAD territory wall motion abnormality since that infarction with mildly reduced left ventricular function.      This year he completed a stress echocardiogram prior to my visit.  That study showed a small area of distal anterior and apical hypokinesis with mildly reduced left ventricular systolic function.  His ejection fraction was 45%-50%.  He exercised for 10 minutes without chest pain on the treadmill and had no ECG changes.  There were no new wall motion abnormalities identified.  His left ventricular function improved appropriately.  There was no evidence for ischemia.      He has been feeling well.  He has been exercising on his [employer] bike consistently without any concerning cardiac symptoms.  He has not been eating as well.  He admits that he has been snacking more and drinking some sugared soda.  He has been eating vega, eggs and hash browns for breakfast once a week.  As a result, his weight is up about 7 pounds since last year and 16 pounds since 2 years ago.  His cholesterol numbers are up as well.  His LDL is up to 83 from 77 and HDL is stable at 40.  His triglycerides are normal.      Otherwise, he has been feeling well without any concerning cardiac symptoms.  He has occasional mild lightheadedness while standing, but that has not caused him to fall or have syncope.      PHYSICAL EXAMINATION:  Today his blood pressure is  103/68, heart rate 68 and weight 261 pounds.  His lungs are clear.  His heart rhythm is regular.  He has no cardiac murmurs or carotid bruits.      IMPRESSIONS:  Mr. Marian Butterfield is a 60-year-old gentleman with hypertension, dyslipidemia and history of anterior wall myocardial infarction in 2017.  He underwent stenting of his LAD, diagonal and posterior descending coronary arteries at that time.  He has mild left ventricular dysfunction, now with a small residual wall motion abnormality present.      He has been exercising well but not eating as well as he should.  He is not having any concerning cardiac symptoms and his stress test looks normal aside from his prior infarction.  I urged him to eat better and try to improve his weight and cholesterol numbers.  I will recheck his cholesterol in 3 months and consider adding Zetia 10 mg a day at that time if his LDL remains above 70.  Otherwise, I will plan to see him back again in 1 year for reevaluation.         LISA PARDO MD, [facility]             D: 2021   T: 2021   MT: mckayla      Name:     MARIAN BUTTERFIELD   MRN:      [id number]        Account:      [id number]   :      1960           Service Date: 2021      Document: [id number]

## 2021-06-07 ENCOUNTER — CARE COORDINATION (OUTPATIENT)
Dept: CARDIOLOGY | Facility: CLINIC | Age: 61
End: 2021-06-07

## 2021-06-07 DIAGNOSIS — E78.5 DYSLIPIDEMIA, GOAL LDL BELOW 70: Primary | ICD-10-CM

## 2021-06-07 DIAGNOSIS — E78.5 DYSLIPIDEMIA, GOAL LDL BELOW 70: ICD-10-CM

## 2021-06-07 LAB
CHOLEST SERPL-MCNC: 148 MG/DL
HDLC SERPL-MCNC: 38 MG/DL
LDLC SERPL CALC-MCNC: 87 MG/DL
NONHDLC SERPL-MCNC: 110 MG/DL
TRIGL SERPL-MCNC: 116 MG/DL

## 2021-06-07 PROCEDURE — 80061 LIPID PANEL: CPT | Performed by: INTERNAL MEDICINE

## 2021-06-07 PROCEDURE — 36415 COLL VENOUS BLD VENIPUNCTURE: CPT | Performed by: INTERNAL MEDICINE

## 2021-06-07 NOTE — TELEPHONE ENCOUNTER
Yes, please add Zetia 10 mg daily. I should have said that I would add it if his numbers were not better. Thanks. EE

## 2021-06-07 NOTE — PROGRESS NOTES
"FLP results from 6/7/21 noted. Pt had visit with  3/2021, and at that time  said, \" I urged him to eat better and try to improve his weight and cholesterol numbers.  I will recheck his cholesterol in 3 months and consider adding Zetia 10 mg a day at that time if his LDL remains above 70.  Otherwise, I will plan to see him back again in 1 year for reevaluation. \"    Will send update on results to . Sebastián KENNEY June 7, 2021, 2:31 PM    Component      Latest Ref Rng & Units 3/3/2021 6/7/2021   Cholesterol      <200 mg/dL 144 148   Triglycerides      <150 mg/dL 105 116   HDL Cholesterol      >39 mg/dL 40 38 (L)   LDL Cholesterol Calculated      <100 mg/dL 83 87   Non HDL Cholesterol      <130 mg/dL 104 110       "

## 2021-06-08 RX ORDER — EZETIMIBE 10 MG/1
10 TABLET ORAL DAILY
Qty: 90 TABLET | Refills: 3 | Status: SHIPPED | OUTPATIENT
Start: 2021-06-08 | End: 2022-04-18

## 2021-06-08 NOTE — PROGRESS NOTES
Pt called with FLP results and 's instructions to start zetia 10 mg daily and have flp check in 3 months. Prescription sent to pt's pharmacy for zetia 10 mg daily. He had no other questions at this time. Sebastián KENNEY June 8, 2021, 9:22 AM

## 2022-03-04 DIAGNOSIS — I10 BENIGN ESSENTIAL HTN: ICD-10-CM

## 2022-03-04 DIAGNOSIS — E78.5 DYSLIPIDEMIA, GOAL LDL BELOW 70: ICD-10-CM

## 2022-03-04 DIAGNOSIS — I21.3 ST ELEVATION MYOCARDIAL INFARCTION (STEMI), UNSPECIFIED ARTERY (H): ICD-10-CM

## 2022-03-04 DIAGNOSIS — I25.5 ISCHEMIC CARDIOMYOPATHY: ICD-10-CM

## 2022-03-04 RX ORDER — LISINOPRIL 5 MG/1
5 TABLET ORAL DAILY
Qty: 90 TABLET | Refills: 0 | Status: SHIPPED | OUTPATIENT
Start: 2022-03-04 | End: 2022-04-18

## 2022-03-04 RX ORDER — CARVEDILOL 12.5 MG/1
12.5 TABLET ORAL 2 TIMES DAILY WITH MEALS
Qty: 180 TABLET | Refills: 0 | Status: SHIPPED | OUTPATIENT
Start: 2022-03-04 | End: 2022-04-18

## 2022-03-04 RX ORDER — ATORVASTATIN CALCIUM 80 MG/1
80 TABLET, FILM COATED ORAL AT BEDTIME
Qty: 90 TABLET | Refills: 0 | Status: SHIPPED | OUTPATIENT
Start: 2022-03-04 | End: 2022-04-18

## 2022-04-11 DIAGNOSIS — I25.10 CORONARY ARTERY DISEASE INVOLVING NATIVE CORONARY ARTERY OF NATIVE HEART WITHOUT ANGINA PECTORIS: ICD-10-CM

## 2022-04-11 DIAGNOSIS — E78.5 DYSLIPIDEMIA, GOAL LDL BELOW 70: ICD-10-CM

## 2022-04-11 DIAGNOSIS — I25.5 ISCHEMIC CARDIOMYOPATHY: Primary | ICD-10-CM

## 2022-04-18 ENCOUNTER — OFFICE VISIT (OUTPATIENT)
Dept: CARDIOLOGY | Facility: CLINIC | Age: 62
End: 2022-04-18
Payer: COMMERCIAL

## 2022-04-18 ENCOUNTER — LAB (OUTPATIENT)
Dept: LAB | Facility: CLINIC | Age: 62
End: 2022-04-18
Payer: COMMERCIAL

## 2022-04-18 VITALS
HEIGHT: 74 IN | WEIGHT: 260.3 LBS | DIASTOLIC BLOOD PRESSURE: 70 MMHG | SYSTOLIC BLOOD PRESSURE: 105 MMHG | BODY MASS INDEX: 33.41 KG/M2 | HEART RATE: 66 BPM

## 2022-04-18 DIAGNOSIS — I25.5 ISCHEMIC CARDIOMYOPATHY: ICD-10-CM

## 2022-04-18 DIAGNOSIS — I25.10 CORONARY ARTERY DISEASE INVOLVING NATIVE CORONARY ARTERY OF NATIVE HEART WITHOUT ANGINA PECTORIS: ICD-10-CM

## 2022-04-18 DIAGNOSIS — I21.3 ST ELEVATION MYOCARDIAL INFARCTION (STEMI), UNSPECIFIED ARTERY (H): ICD-10-CM

## 2022-04-18 DIAGNOSIS — E78.5 DYSLIPIDEMIA, GOAL LDL BELOW 70: ICD-10-CM

## 2022-04-18 DIAGNOSIS — I10 BENIGN ESSENTIAL HTN: ICD-10-CM

## 2022-04-18 LAB
ALT SERPL W P-5'-P-CCNC: 31 U/L (ref 0–70)
ANION GAP SERPL CALCULATED.3IONS-SCNC: 2 MMOL/L (ref 3–14)
BUN SERPL-MCNC: 16 MG/DL (ref 7–30)
CALCIUM SERPL-MCNC: 8.9 MG/DL (ref 8.5–10.1)
CHLORIDE BLD-SCNC: 109 MMOL/L (ref 94–109)
CHOLEST SERPL-MCNC: 104 MG/DL
CO2 SERPL-SCNC: 27 MMOL/L (ref 20–32)
CREAT SERPL-MCNC: 1.21 MG/DL (ref 0.66–1.25)
FASTING STATUS PATIENT QL REPORTED: YES
GFR SERPL CREATININE-BSD FRML MDRD: 68 ML/MIN/1.73M2
GLUCOSE BLD-MCNC: 104 MG/DL (ref 70–99)
HDLC SERPL-MCNC: 40 MG/DL
LDLC SERPL CALC-MCNC: 49 MG/DL
NONHDLC SERPL-MCNC: 64 MG/DL
POTASSIUM BLD-SCNC: 4.1 MMOL/L (ref 3.4–5.3)
SODIUM SERPL-SCNC: 138 MMOL/L (ref 133–144)
TRIGL SERPL-MCNC: 76 MG/DL

## 2022-04-18 PROCEDURE — 84460 ALANINE AMINO (ALT) (SGPT): CPT | Performed by: INTERNAL MEDICINE

## 2022-04-18 PROCEDURE — 80061 LIPID PANEL: CPT | Performed by: INTERNAL MEDICINE

## 2022-04-18 PROCEDURE — 36415 COLL VENOUS BLD VENIPUNCTURE: CPT | Performed by: INTERNAL MEDICINE

## 2022-04-18 PROCEDURE — 99214 OFFICE O/P EST MOD 30 MIN: CPT | Performed by: PHYSICIAN ASSISTANT

## 2022-04-18 PROCEDURE — 80048 BASIC METABOLIC PNL TOTAL CA: CPT | Performed by: INTERNAL MEDICINE

## 2022-04-18 RX ORDER — EZETIMIBE 10 MG/1
10 TABLET ORAL DAILY
Qty: 90 TABLET | Refills: 3 | Status: SHIPPED | OUTPATIENT
Start: 2022-04-18 | End: 2023-06-07

## 2022-04-18 RX ORDER — CARVEDILOL 12.5 MG/1
12.5 TABLET ORAL 2 TIMES DAILY WITH MEALS
Qty: 180 TABLET | Refills: 3 | Status: SHIPPED | OUTPATIENT
Start: 2022-04-18 | End: 2023-05-24

## 2022-04-18 RX ORDER — LISINOPRIL 5 MG/1
5 TABLET ORAL DAILY
Qty: 90 TABLET | Refills: 3 | Status: SHIPPED | OUTPATIENT
Start: 2022-04-18 | End: 2023-05-12

## 2022-04-18 RX ORDER — ATORVASTATIN CALCIUM 80 MG/1
80 TABLET, FILM COATED ORAL AT BEDTIME
Qty: 90 TABLET | Refills: 3 | Status: SHIPPED | OUTPATIENT
Start: 2022-04-18 | End: 2023-05-12

## 2022-04-18 RX ORDER — NITROGLYCERIN 0.4 MG/1
0.4 TABLET SUBLINGUAL EVERY 5 MIN PRN
Qty: 25 TABLET | Refills: 1 | Status: SHIPPED | OUTPATIENT
Start: 2022-04-18

## 2022-04-18 NOTE — LETTER
4/18/2022    Larry Aspen, DO  [facility] [address] [place] [address]    RE: Mark Butterfield       Dear Colleague,     I had the pleasure of seeing Mark Butterfield in the [facility].  [id number]      HPI and Plan:   See dictation    Orders this Visit:  Orders Placed This Encounter   Procedures     Lipid Profile     ALT     Basic metabolic panel     Follow-Up with Cardiology     Echocardiogram Complete     Orders Placed This Encounter   Medications     atorvastatin (LIPITOR) 80 MG tablet     Sig: Take 1 tablet (80 mg) by mouth At Bedtime     Dispense:  90 tablet     Refill:  3     carvedilol (COREG) 12.5 MG tablet     Sig: Take 1 tablet (12.5 mg) by mouth 2 times daily (with meals)     Dispense:  180 tablet     Refill:  3     ezetimibe (ZETIA) 10 MG tablet     Sig: Take 1 tablet (10 mg) by mouth daily     Dispense:  90 tablet     Refill:  3     lisinopril (ZESTRIL) 5 MG tablet     Sig: Take 1 tablet (5 mg) by mouth daily     Dispense:  90 tablet     Refill:  3     nitroGLYcerin (NITROSTAT) 0.4 MG sublingual tablet     Sig: Place 1 tablet (0.4 mg) under the tongue every 5 minutes as needed for chest pain (may repeat x 2)     Dispense:  25 tablet     Refill:  1     Medications Discontinued During This Encounter   Medication Reason     nitroGLYcerin (NITROSTAT) 0.4 MG sublingual tablet Reorder     ezetimibe (ZETIA) 10 MG tablet Reorder     atorvastatin (LIPITOR) 80 MG tablet      carvedilol (COREG) 12.5 MG tablet      lisinopril (ZESTRIL) 5 MG tablet          Encounter Diagnoses   Name Primary?     Dyslipidemia, goal LDL below 70      Ischemic cardiomyopathy      Benign essential HTN      ST elevation myocardial infarction (STEMI), unspecified artery (H)        CURRENT MEDICATIONS:  Current Outpatient Medications   Medication Sig Dispense Refill     aspirin (ASA) 81 MG EC tablet Take 1 tablet (81 mg) by mouth daily 90 tablet 0     atorvastatin (LIPITOR) 80 MG tablet  Take 1 tablet (80 mg) by mouth At Bedtime 90 tablet 3     carvedilol (COREG) 12.5 MG tablet Take 1 tablet (12.5 mg) by mouth 2 times daily (with meals) 180 tablet 3     ezetimibe (ZETIA) 10 MG tablet Take 1 tablet (10 mg) by mouth daily 90 tablet 3     lisinopril (ZESTRIL) 5 MG tablet Take 1 tablet (5 mg) by mouth daily 90 tablet 3     nitroGLYcerin (NITROSTAT) 0.4 MG sublingual tablet Place 1 tablet (0.4 mg) under the tongue every 5 minutes as needed for chest pain (may repeat x 2) 25 tablet 1       ALLERGIES     Allergies   Allergen Reactions     Penicillins      nausea       PAST MEDICAL HISTORY:  Past Medical History:   Diagnosis Date     Benign essential HTN 1/27/2017     Coronary artery disease involving native coronary artery of native heart without angina pectoris 1/31/2017     Dyslipidemia, goal LDL below 70 1/27/2017     Ischemic cardiomyopathy 1/27/2017     ST elevation (STEMI) myocardial infarction involving left anterior descending coronary artery (H) 1/24/2017       PAST SURGICAL HISTORY:  Past Surgical History:   Procedure Laterality Date     HEART CATH LEFT HEART CATH  01/24/2017    LIZY to mid LAD     HEART CATH LEFT HEART CATH  01/27/2017    LIZY to mid rPDA       FAMILY HISTORY:  Family History   Problem Relation Age of Onset     Coronary Artery Disease Mother      Coronary Artery Disease Father        SOCIAL HISTORY:  Social History     Socioeconomic History     Marital status:      Spouse name: None     Number of children: None     Years of education: None     Highest education level: None   Tobacco Use     Smoking status: Never Smoker     Smokeless tobacco: Never Used   Substance and Sexual Activity     Alcohol use: No     Alcohol/week: 0.0 standard drinks     Drug use: No   Other Topics Concern     Parent/sibling w/ CABG, MI or angioplasty before 65F 55M? No       Review of Systems:  Skin:  Negative     Eyes:  Negative    ENT:  Negative postnasal drainage  Respiratory:  Negative   "  Cardiovascular:  Negative;palpitations;chest pain;syncope or near-syncope;cyanosis;fatigue;edema;exercise intolerance    Gastroenterology: Negative    Genitourinary:  Negative    Musculoskeletal:  Negative    Neurologic:  Negative    Psychiatric:  Negative    Heme/Lymph/Imm:  Negative    Endocrine:  Negative      Physical Exam:  Vitals: /70   Pulse 66   Ht 1.88 m (6' 2\")   Wt 118.1 kg (260 lb 4.8 oz)   BMI 33.42 kg/m     Please refer to dictation for physical exam    Recent Lab Results: all reviewed today  CBC RESULTS:  Lab Results   Component Value Date    WBC 13.1 (H) 01/25/2017    RBC 4.90 01/25/2017    HGB 15.5 01/25/2017    HCT 43.1 01/25/2017    MCV 88 01/25/2017    MCH 31.6 01/25/2017    MCHC 36.0 01/25/2017    RDW 13.0 01/25/2017     01/25/2017       BMP RESULTS:  Lab Results   Component Value Date     04/18/2022     03/03/2021    POTASSIUM 4.1 04/18/2022    POTASSIUM 4.1 03/03/2021    CHLORIDE 109 04/18/2022    CHLORIDE 109 03/03/2021    CO2 27 04/18/2022    CO2 27 03/03/2021    ANIONGAP 2 (L) 04/18/2022    ANIONGAP 4 03/03/2021     (H) 04/18/2022     (H) 03/03/2021    BUN 16 04/18/2022    BUN 18 03/03/2021    CR 1.21 04/18/2022    CR 1.21 03/03/2021    GFRESTIMATED 68 04/18/2022    GFRESTIMATED 64 03/03/2021    GFRESTBLACK 74 03/03/2021    LUCA 8.9 04/18/2022    LUCA 9.2 03/03/2021        INR RESULTS:  Lab Results   Component Value Date    INR 1.02 01/24/2017       CC  No referring provider defined for this encounter.    Thank you for allowing me to participate in the care of your patient.      Sincerely,     Maria Victoria Shields PA-C     [facility]  "

## 2022-04-18 NOTE — PROGRESS NOTES
[id number]      HPI and Plan:   See dictation    Orders this Visit:  Orders Placed This Encounter   Procedures     Lipid Profile     ALT     Basic metabolic panel     Follow-Up with Cardiology     Echocardiogram Complete     Orders Placed This Encounter   Medications     atorvastatin (LIPITOR) 80 MG tablet     Sig: Take 1 tablet (80 mg) by mouth At Bedtime     Dispense:  90 tablet     Refill:  3     carvedilol (COREG) 12.5 MG tablet     Sig: Take 1 tablet (12.5 mg) by mouth 2 times daily (with meals)     Dispense:  180 tablet     Refill:  3     ezetimibe (ZETIA) 10 MG tablet     Sig: Take 1 tablet (10 mg) by mouth daily     Dispense:  90 tablet     Refill:  3     lisinopril (ZESTRIL) 5 MG tablet     Sig: Take 1 tablet (5 mg) by mouth daily     Dispense:  90 tablet     Refill:  3     nitroGLYcerin (NITROSTAT) 0.4 MG sublingual tablet     Sig: Place 1 tablet (0.4 mg) under the tongue every 5 minutes as needed for chest pain (may repeat x 2)     Dispense:  25 tablet     Refill:  1     Medications Discontinued During This Encounter   Medication Reason     nitroGLYcerin (NITROSTAT) 0.4 MG sublingual tablet Reorder     ezetimibe (ZETIA) 10 MG tablet Reorder     atorvastatin (LIPITOR) 80 MG tablet      carvedilol (COREG) 12.5 MG tablet      lisinopril (ZESTRIL) 5 MG tablet          Encounter Diagnoses   Name Primary?     Dyslipidemia, goal LDL below 70      Ischemic cardiomyopathy      Benign essential HTN      ST elevation myocardial infarction (STEMI), unspecified artery (H)        CURRENT MEDICATIONS:  Current Outpatient Medications   Medication Sig Dispense Refill     aspirin (ASA) 81 MG EC tablet Take 1 tablet (81 mg) by mouth daily 90 tablet 0     atorvastatin (LIPITOR) 80 MG tablet Take 1 tablet (80 mg) by mouth At Bedtime 90 tablet 3     carvedilol (COREG) 12.5 MG tablet Take 1 tablet (12.5 mg) by mouth 2 times daily (with meals) 180 tablet 3     ezetimibe (ZETIA) 10 MG tablet Take 1 tablet (10 mg) by mouth daily 90  tablet 3     lisinopril (ZESTRIL) 5 MG tablet Take 1 tablet (5 mg) by mouth daily 90 tablet 3     nitroGLYcerin (NITROSTAT) 0.4 MG sublingual tablet Place 1 tablet (0.4 mg) under the tongue every 5 minutes as needed for chest pain (may repeat x 2) 25 tablet 1       ALLERGIES     Allergies   Allergen Reactions     Penicillins      nausea       PAST MEDICAL HISTORY:  Past Medical History:   Diagnosis Date     Benign essential HTN 1/27/2017     Coronary artery disease involving native coronary artery of native heart without angina pectoris 1/31/2017     Dyslipidemia, goal LDL below 70 1/27/2017     Ischemic cardiomyopathy 1/27/2017     ST elevation (STEMI) myocardial infarction involving left anterior descending coronary artery (H) 1/24/2017       PAST SURGICAL HISTORY:  Past Surgical History:   Procedure Laterality Date     HEART CATH LEFT HEART CATH  01/24/2017    LIZY to mid LAD     HEART CATH LEFT HEART CATH  01/27/2017    LIZY to mid rPDA       FAMILY HISTORY:  Family History   Problem Relation Age of Onset     Coronary Artery Disease Mother      Coronary Artery Disease Father        SOCIAL HISTORY:  Social History     Socioeconomic History     Marital status:      Spouse name: None     Number of children: None     Years of education: None     Highest education level: None   Tobacco Use     Smoking status: Never Smoker     Smokeless tobacco: Never Used   Substance and Sexual Activity     Alcohol use: No     Alcohol/week: 0.0 standard drinks     Drug use: No   Other Topics Concern     Parent/sibling w/ CABG, MI or angioplasty before 65F 55M? No       Review of Systems:  Skin:  Negative     Eyes:  Negative    ENT:  Negative postnasal drainage  Respiratory:  Negative    Cardiovascular:  Negative;palpitations;chest pain;syncope or near-syncope;cyanosis;fatigue;edema;exercise intolerance    Gastroenterology: Negative    Genitourinary:  Negative    Musculoskeletal:  Negative    Neurologic:  Negative    Psychiatric:  " Negative    Heme/Lymph/Imm:  Negative    Endocrine:  Negative      Physical Exam:  Vitals: /70   Pulse 66   Ht 1.88 m (6' 2\")   Wt 118.1 kg (260 lb 4.8 oz)   BMI 33.42 kg/m     Please refer to dictation for physical exam    Recent Lab Results: all reviewed today  CBC RESULTS:  Lab Results   Component Value Date    WBC 13.1 (H) 01/25/2017    RBC 4.90 01/25/2017    HGB 15.5 01/25/2017    HCT 43.1 01/25/2017    MCV 88 01/25/2017    MCH 31.6 01/25/2017    MCHC 36.0 01/25/2017    RDW 13.0 01/25/2017     01/25/2017       BMP RESULTS:  Lab Results   Component Value Date     04/18/2022     03/03/2021    POTASSIUM 4.1 04/18/2022    POTASSIUM 4.1 03/03/2021    CHLORIDE 109 04/18/2022    CHLORIDE 109 03/03/2021    CO2 27 04/18/2022    CO2 27 03/03/2021    ANIONGAP 2 (L) 04/18/2022    ANIONGAP 4 03/03/2021     (H) 04/18/2022     (H) 03/03/2021    BUN 16 04/18/2022    BUN 18 03/03/2021    CR 1.21 04/18/2022    CR 1.21 03/03/2021    GFRESTIMATED 68 04/18/2022    GFRESTIMATED 64 03/03/2021    GFRESTBLACK 74 03/03/2021    LUCA 8.9 04/18/2022    LUCA 9.2 03/03/2021        INR RESULTS:  Lab Results   Component Value Date    INR 1.02 01/24/2017           CC  No referring provider defined for this encounter.      "

## 2022-04-18 NOTE — PATIENT INSTRUCTIONS
Thank you for visiting with me today.    We discussed: I'm glad you're feeling well.      Medication changes:  continue current medications.      Results: Labs are perfect!   Latest Reference Range & Units 04/18/22 09:14   Sodium 133 - 144 mmol/L 138   Potassium 3.4 - 5.3 mmol/L 4.1   Chloride 94 - 109 mmol/L 109   Carbon Dioxide 20 - 32 mmol/L 27   Urea Nitrogen 7 - 30 mg/dL 16   Creatinine 0.66 - 1.25 mg/dL 1.21   GFR Estimate >60 mL/min/1.73m2 68 [1]   Calcium 8.5 - 10.1 mg/dL 8.9   Anion Gap 3 - 14 mmol/L 2 (L)   ALT 0 - 70 U/L 31   Cholesterol <200 mg/dL 104   Patient Fasting > 8hrs?  Yes   HDL Cholesterol >=40 mg/dL 40   LDL Cholesterol Calculated <=100 mg/dL 49   Non HDL Cholesterol <130 mg/dL 64   Triglycerides <150 mg/dL 76   Glucose 70 - 99 mg/dL 104 (H)       Follow up: Establish with a primary care doctor.    We'll have you see Dr. Joe with labs and an echocardiogram before that visit.        Please call my nurse, Dorothea, at [phone] with any questions or concerns.    Scheduling phone number: [phone]  Reminder: Please bring in all current medications, over the counter supplements and vitamin bottles to your next appointment.

## 2022-04-18 NOTE — PROGRESS NOTES
Service Date: 2022    PRIMARY CARDIOLOGIST:  Tomas Joe MD    REASON FOR VISIT:    1.  Coronary artery disease.  2.  Dyslipidemia, followup.    HISTORY OF PRESENT ILLNESS:  Mr. Butterfield is a delightful 61-year-old gentleman with past medical history significant for the followin.  Anterior STEMI 2017 where he was found to have an occluded mid LAD and underwent drug-eluting stent placement.  He had a POBA of the D4 as it was jailed.  He underwent staged PCI of the RPDA with a drug-eluting stent for an 85% lesion.  2.  Mild ischemic cardiomyopathy, EF of 45%-50%.  3.  Dyslipidemia.  4.  Hypertension.  5.  Strong family history of coronary artery disease.     comes in today for routine followup.  He overall feels well.  He denies chest pain, shortness of breath, orthopnea or PND.  He has been able to do all activities he wants at home including recently making maple syrup with his friends carrying around 5-gallon buckets through the woods collecting sap.  He denies chest pain or presyncope with this activity.  He has not had chest heaviness.  He is back to living his completely normal life.    SOCIAL HISTORY:  He is , they have a cabin at [facility] near [place].  He just bought a barge to put in his lifts and docks.  He is a lifelong nonsmoker.  No alcohol use, no drug use, trying to watch his diet.    PHYSICAL EXAMINATION:    GENERAL:  Well-developed, well-nourished gentleman in no acute distress.  HEENT:  Normocephalic, atraumatic.  HEART:  Regular in rate and rhythm.  I do not appreciate murmur, rub or gallop.  LUNGS:  Clear, without wheezes, rales, or rhonchi.  EXTREMITIES:  Without peripheral edema.  SKIN:  Warm and dry.  NECK:  Carotids are quiet.    ASSESSMENT AND PLAN:    1.  Coronary artery disease with history of STEMI in 2017 with drug-eluting stent to the mid LAD, POBA of a diagonal and drug-eluting stent to the RCA.  He has had no recurrent anginal symptoms and is on appropriate  medications and doing well.  2.  Dyslipidemia with excellent lipid panel, LDL 49, HDL 40, total cholesterol 104, triglycerides 76.  We will continue Zetia and atorvastatin.  ALT is normal at 31.  3.  Hypertension, well controlled.  4.  Mild ischemic cardiomyopathy with EF around 50% without any symptoms of heart failure.  We will continue lisinopril and carvedilol.    This patient needs to establish with a primary care provider.  We discussed several options in [place] or [place].  He will set up with him to get things like his prostate and colon checked.  He will otherwise follow up with Dr. Joe in 1 year with repeat lipid panel and ALT and echocardiogram at that time.  He will call sooner with any concerns.    Thank you for allowing me to participate in this delightful patient's care.    Maria Victoria Shields PA-C        D: 2022   T: 2022   MT: [id number]    Name:     MARIAN GASPARMelida  MRN:      [id number]        Account:      [id number]   :      1960           Service Date: 2022       Document: [id number]

## 2022-07-10 ENCOUNTER — HEALTH MAINTENANCE LETTER (OUTPATIENT)
Age: 62
End: 2022-07-10

## 2022-09-11 ENCOUNTER — HEALTH MAINTENANCE LETTER (OUTPATIENT)
Age: 62
End: 2022-09-11

## 2023-03-10 ENCOUNTER — HOSPITAL ENCOUNTER (OUTPATIENT)
Dept: CARDIOLOGY | Facility: CLINIC | Age: 63
Discharge: HOME | End: 2023-03-10
Attending: PHYSICIAN ASSISTANT | Admitting: PHYSICIAN ASSISTANT
Payer: COMMERCIAL

## 2023-03-10 ENCOUNTER — LAB (OUTPATIENT)
Dept: LAB | Facility: CLINIC | Age: 63
End: 2023-03-10
Payer: COMMERCIAL

## 2023-03-10 DIAGNOSIS — E78.5 DYSLIPIDEMIA, GOAL LDL BELOW 70: ICD-10-CM

## 2023-03-10 DIAGNOSIS — I10 BENIGN ESSENTIAL HTN: ICD-10-CM

## 2023-03-10 DIAGNOSIS — I25.5 ISCHEMIC CARDIOMYOPATHY: ICD-10-CM

## 2023-03-10 LAB
ALT SERPL W P-5'-P-CCNC: 33 U/L (ref 10–50)
ANION GAP SERPL CALCULATED.3IONS-SCNC: 8 MMOL/L (ref 7–15)
BUN SERPL-MCNC: 20.3 MG/DL (ref 8–23)
CALCIUM SERPL-MCNC: 9.4 MG/DL (ref 8.8–10.2)
CHLORIDE SERPL-SCNC: 104 MMOL/L (ref 98–107)
CHOLEST SERPL-MCNC: 110 MG/DL
CREAT SERPL-MCNC: 1.2 MG/DL (ref 0.67–1.17)
DEPRECATED HCO3 PLAS-SCNC: 29 MMOL/L (ref 22–29)
GFR SERPL CREATININE-BSD FRML MDRD: 68 ML/MIN/1.73M2
GLUCOSE SERPL-MCNC: 97 MG/DL (ref 70–99)
HDLC SERPL-MCNC: 32 MG/DL
LDLC SERPL CALC-MCNC: 63 MG/DL
NONHDLC SERPL-MCNC: 78 MG/DL
POTASSIUM SERPL-SCNC: 4.3 MMOL/L (ref 3.4–5.3)
SODIUM SERPL-SCNC: 141 MMOL/L (ref 136–145)
TRIGL SERPL-MCNC: 74 MG/DL

## 2023-03-10 PROCEDURE — 80048 BASIC METABOLIC PNL TOTAL CA: CPT | Performed by: PHYSICIAN ASSISTANT

## 2023-03-10 PROCEDURE — 84460 ALANINE AMINO (ALT) (SGPT): CPT | Performed by: PHYSICIAN ASSISTANT

## 2023-03-10 PROCEDURE — 36415 COLL VENOUS BLD VENIPUNCTURE: CPT | Performed by: PHYSICIAN ASSISTANT

## 2023-03-10 PROCEDURE — 80061 LIPID PANEL: CPT | Performed by: PHYSICIAN ASSISTANT

## 2023-03-10 PROCEDURE — 999N000208 ECHOCARDIOGRAM COMPLETE

## 2023-03-10 PROCEDURE — 93306 TTE W/DOPPLER COMPLETE: CPT | Mod: 26 | Performed by: INTERNAL MEDICINE

## 2023-03-10 PROCEDURE — 255N000002 HC RX 255 OP 636: Performed by: PHYSICIAN ASSISTANT

## 2023-03-10 RX ADMIN — HUMAN ALBUMIN MICROSPHERES AND PERFLUTREN 9 ML: 10; .22 INJECTION, SOLUTION INTRAVENOUS at 10:52

## 2023-03-20 ASSESSMENT — ENCOUNTER SYMPTOMS
SORE THROAT: 0
CONSTIPATION: 0
FEVER: 0
DIZZINESS: 0
EYE PAIN: 0
NERVOUS/ANXIOUS: 0
HEMATURIA: 0
JOINT SWELLING: 0
SHORTNESS OF BREATH: 0
MYALGIAS: 0
CHILLS: 0
PALPITATIONS: 0
NAUSEA: 0
HEADACHES: 0
COUGH: 0
PARESTHESIAS: 0
WEAKNESS: 0
DIARRHEA: 0
ARTHRALGIAS: 0
FREQUENCY: 0
DYSURIA: 0
HEMATOCHEZIA: 0
ABDOMINAL PAIN: 0
HEARTBURN: 0

## 2023-03-22 ENCOUNTER — LAB (OUTPATIENT)
Dept: FAMILY MEDICINE | Facility: CLINIC | Age: 63
End: 2023-03-22

## 2023-03-22 ENCOUNTER — OFFICE VISIT (OUTPATIENT)
Dept: FAMILY MEDICINE | Facility: CLINIC | Age: 63
End: 2023-03-22
Payer: COMMERCIAL

## 2023-03-22 VITALS
WEIGHT: 266 LBS | HEART RATE: 71 BPM | TEMPERATURE: 97.7 F | OXYGEN SATURATION: 96 % | HEIGHT: 74 IN | SYSTOLIC BLOOD PRESSURE: 110 MMHG | DIASTOLIC BLOOD PRESSURE: 68 MMHG | RESPIRATION RATE: 14 BRPM | BODY MASS INDEX: 34.14 KG/M2

## 2023-03-22 DIAGNOSIS — Z28.21 PNEUMOCOCCAL VACCINATION DECLINED: ICD-10-CM

## 2023-03-22 DIAGNOSIS — L82.1 SEBORRHEIC KERATOSES: ICD-10-CM

## 2023-03-22 DIAGNOSIS — I25.5 ISCHEMIC CARDIOMYOPATHY: ICD-10-CM

## 2023-03-22 DIAGNOSIS — Z28.21 INFLUENZA VACCINATION DECLINED: ICD-10-CM

## 2023-03-22 DIAGNOSIS — E66.811 CLASS 1 OBESITY DUE TO EXCESS CALORIES WITHOUT SERIOUS COMORBIDITY WITH BODY MASS INDEX (BMI) OF 34.0 TO 34.9 IN ADULT: ICD-10-CM

## 2023-03-22 DIAGNOSIS — Z23 NEED FOR TDAP VACCINATION: ICD-10-CM

## 2023-03-22 DIAGNOSIS — Z28.21 COVID-19 VACCINATION DECLINED: ICD-10-CM

## 2023-03-22 DIAGNOSIS — Z53.20 PROSTATE CANCER SCREENING DECLINED: ICD-10-CM

## 2023-03-22 DIAGNOSIS — E66.09 CLASS 1 OBESITY DUE TO EXCESS CALORIES WITHOUT SERIOUS COMORBIDITY WITH BODY MASS INDEX (BMI) OF 34.0 TO 34.9 IN ADULT: ICD-10-CM

## 2023-03-22 DIAGNOSIS — Z12.11 SCREEN FOR COLON CANCER: ICD-10-CM

## 2023-03-22 DIAGNOSIS — Z00.00 ROUTINE GENERAL MEDICAL EXAMINATION AT A HEALTH CARE FACILITY: ICD-10-CM

## 2023-03-22 DIAGNOSIS — E78.5 DYSLIPIDEMIA, GOAL LDL BELOW 70: ICD-10-CM

## 2023-03-22 DIAGNOSIS — Z00.00 ROUTINE GENERAL MEDICAL EXAMINATION AT A HEALTH CARE FACILITY: Primary | ICD-10-CM

## 2023-03-22 DIAGNOSIS — L81.4 SOLAR LENTIGO: ICD-10-CM

## 2023-03-22 PROCEDURE — 90715 TDAP VACCINE 7 YRS/> IM: CPT | Performed by: FAMILY MEDICINE

## 2023-03-22 PROCEDURE — 99386 PREV VISIT NEW AGE 40-64: CPT | Mod: 25 | Performed by: FAMILY MEDICINE

## 2023-03-22 PROCEDURE — 90471 IMMUNIZATION ADMIN: CPT | Performed by: FAMILY MEDICINE

## 2023-03-22 ASSESSMENT — ENCOUNTER SYMPTOMS
EYE PAIN: 0
HEMATURIA: 0
SORE THROAT: 0
COUGH: 0
FREQUENCY: 0
HEARTBURN: 0
HEADACHES: 0
PARESTHESIAS: 0
NERVOUS/ANXIOUS: 0
WEAKNESS: 0
DIZZINESS: 0
CHILLS: 0
SHORTNESS OF BREATH: 0
JOINT SWELLING: 0
NAUSEA: 0
ARTHRALGIAS: 0
CONSTIPATION: 0
ABDOMINAL PAIN: 0
HEMATOCHEZIA: 0
DIARRHEA: 0
PALPITATIONS: 0
FEVER: 0
DYSURIA: 0
MYALGIAS: 0

## 2023-03-22 ASSESSMENT — PAIN SCALES - GENERAL: PAINLEVEL: NO PAIN (0)

## 2023-03-22 NOTE — PROGRESS NOTES
SUBJECTIVE:   CC:  is an 62 year old who presents for preventative health visit.     Patient has been advised of split billing requirements and indicates understanding: Yes  Healthy Habits:     Getting at least 3 servings of Calcium per day:  Yes    Bi-annual eye exam:  NO    Dental care twice a year:  Yes    Sleep apnea or symptoms of sleep apnea:  None    Diet:  Low salt    Frequency of exercise:  2-3 days/week    Duration of exercise:  Less than 15 minutes    Taking medications regularly:  Yes    Medication side effects:  Not applicable    PHQ-2 Total Score: 0    Additional concerns today:  Yes (skin spots on his chest)    History of MI and 2017.  Follows with cardiology.    Today's PHQ-2 Score:   PHQ-2 ( 1999 Pfizer) 3/20/2023   Q1: Little interest or pleasure in doing things 0   Q2: Feeling down, depressed or hopeless 0   PHQ-2 Score 0   Q1: Little interest or pleasure in doing things Not at all   Q2: Feeling down, depressed or hopeless Not at all   PHQ-2 Score 0     Have you ever done Advance Care Planning? (For example, a Health Directive, POLST, or a discussion with a medical provider or your loved ones about your wishes): No, advance care planning information given to patient to review.  Patient plans to discuss their wishes with loved ones or provider.      Social History     Tobacco Use     Smoking status: Never     Smokeless tobacco: Never   Substance Use Topics     Alcohol use: No     Alcohol Use 3/20/2023   Prescreen: >3 drinks/day or >7 drinks/week? Not Applicable     Last PSA: No results found for: PSA    Reviewed orders with patient. Reviewed health maintenance and updated orders accordingly - Yes  Lab work is in process  BP Readings from Last 3 Encounters:   03/22/23 110/68   04/18/22 105/70   03/05/21 103/68    Wt Readings from Last 3 Encounters:   03/22/23 120.7 kg (266 lb)   04/18/22 118.1 kg (260 lb 4.8 oz)   03/05/21 118.4 kg (261 lb)                  Patient Active Problem List    Diagnosis     ST elevation (STEMI) myocardial infarction involving left anterior descending coronary artery (H)     Ischemic cardiomyopathy     Dyslipidemia, goal LDL below 70     Benign essential HTN     Coronary artery disease involving native coronary artery of native heart without angina pectoris     Benign neoplasm of colon     Calculus of kidney     Stricture and stenosis of esophagus     Past Surgical History:   Procedure Laterality Date     HEART CATH LEFT HEART CATH  01/24/2017    LIZY to mid LAD     HEART CATH LEFT HEART CATH  01/27/2017    LIZY to mid rPDA       Social History     Tobacco Use     Smoking status: Never     Smokeless tobacco: Never   Substance Use Topics     Alcohol use: No     Family History   Problem Relation Age of Onset     Coronary Artery Disease Mother      Coronary Artery Disease Father      Obesity Brother      Coronary Artery Disease Maternal Grandfather      Coronary Artery Disease Paternal Grandfather 56     Colon Cancer No family hx of      Breast Cancer No family hx of      Prostate Cancer No family hx of          Current Outpatient Medications   Medication Sig Dispense Refill     aspirin (ASA) 81 MG EC tablet Take 1 tablet (81 mg) by mouth daily 90 tablet 0     atorvastatin (LIPITOR) 80 MG tablet Take 1 tablet (80 mg) by mouth At Bedtime 90 tablet 3     carvedilol (COREG) 12.5 MG tablet Take 1 tablet (12.5 mg) by mouth 2 times daily (with meals) 180 tablet 3     ezetimibe (ZETIA) 10 MG tablet Take 1 tablet (10 mg) by mouth daily 90 tablet 3     lisinopril (ZESTRIL) 5 MG tablet Take 1 tablet (5 mg) by mouth daily 90 tablet 3     nitroGLYcerin (NITROSTAT) 0.4 MG sublingual tablet Place 1 tablet (0.4 mg) under the tongue every 5 minutes as needed for chest pain (may repeat x 2) 25 tablet 1     Allergies   Allergen Reactions     Penicillins      nausea     Reviewed and updated as needed this visit by clinical staff   Tobacco  Allergies  Meds  Problems  Med Hx  Surg Hx  Fam  "Hx          Reviewed and updated as needed this visit by Provider   Tobacco  Allergies  Meds  Problems  Med Hx  Surg Hx  Fam Hx       Social history reviewed  Past Medical History:   Diagnosis Date     Benign essential HTN 1/27/2017     Coronary artery disease involving native coronary artery of native heart without angina pectoris 1/31/2017     Dyslipidemia, goal LDL below 70 1/27/2017     Ischemic cardiomyopathy 1/27/2017     ST elevation (STEMI) myocardial infarction involving left anterior descending coronary artery (H) 1/24/2017      Past Surgical History:   Procedure Laterality Date     HEART CATH LEFT HEART CATH  01/24/2017    LIZY to mid LAD     HEART CATH LEFT HEART CATH  01/27/2017    LIZY to mid rPDA     Social history reviewed    Review of Systems   Constitutional: Negative for chills and fever.   HENT: Negative for congestion, ear pain, hearing loss and sore throat.    Eyes: Negative for pain and visual disturbance.   Respiratory: Negative for cough and shortness of breath.    Cardiovascular: Negative for chest pain, palpitations and peripheral edema.   Gastrointestinal: Negative for abdominal pain, constipation, diarrhea, heartburn, hematochezia and nausea.   Genitourinary: Negative for dysuria, frequency, genital sores, hematuria, impotence, penile discharge and urgency.   Musculoskeletal: Negative for arthralgias, joint swelling and myalgias.   Skin: Negative for rash.   Neurological: Negative for dizziness, weakness, headaches and paresthesias.   Psychiatric/Behavioral: Negative for mood changes. The patient is not nervous/anxious.      OBJECTIVE:   /68   Pulse 71   Temp 97.7  F (36.5  C) (Temporal)   Resp 14   Ht 1.88 m (6' 2\")   Wt 120.7 kg (266 lb)   SpO2 96%   BMI 34.15 kg/m      Physical Exam  GENERAL: healthy, alert and no distress  EYES: Eyes grossly normal to inspection, PERRL and conjunctivae and sclerae normal  HENT: ear canals and TM's normal, nose and mouth without " ulcers or lesions  NECK: no adenopathy, no asymmetry, masses, or scars and thyroid normal to palpation  RESP: lungs clear to auscultation - no rales, rhonchi or wheezes  CV: regular rate and rhythm, normal S1 S2, no S3 or S4, no murmur, click or rub, no peripheral edema and peripheral pulses strong  ABDOMEN: soft, nontender, no hepatosplenomegaly, no masses and bowel sounds normal  MS: no gross musculoskeletal defects noted, no edema  SKIN: Various seborrheic keratoses and solar lentigo present.  No suspicious lesions or rashes  NEURO: Normal strength and tone, mentation intact and speech normal  PSYCH: mentation appears normal, affect normal/bright    Diagnostic Test Results: Reviewed with patient from 3/10/23     ASSESSMENT/PLAN:   1. Routine general medical examination at a health care facility: Discussed personal health and safety. Routine screenings as below. Appropriate anticipatory guidance, vaccinations, and health screening recommendations delivered according to the USPSTF and other appropriate society guidelines.  Patient understands and is agreeable with the plan ordered below.  - REVIEW OF HEALTH MAINTENANCE PROTOCOL ORDERS  - TDAP VACCINE (Adacel, Boostrix)  - PRIMARY CARE FOLLOW-UP SCHEDULING; Future  - VACCINE ADMINISTRATION, INITIAL  - COLOGUARD([employer]); Future    2. Seborrheic keratoses: Reassured.    3. Solar lentigo: Encourage sunscreen.    4. Ischemic cardiomyopathy: Updated medication list.  Follow with cardiology.  Otherwise not discussed.  Echocardiogram stable from 3/10/2023 when compared to 8/29/2019.  Has cardiology follow-up scheduled.    5. Dyslipidemia, goal LDL below 70: Continues on statin.    6. Screen for colon cancer: Elects to perform Cologuard.  - COLOGUARD([employer]); Future    7. Prostate cancer screening declined    8. Need for Tdap vaccination  - TDAP VACCINE (Adacel, Boostrix)  - VACCINE ADMINISTRATION, INITIAL    9. Influenza vaccination declined    10. COVID-19  "vaccination declined    11. Pneumococcal vaccination declined    12. Class 1 obesity due to excess calories without serious comorbidity with body mass index (BMI) of 34.0 to 34.9 in adult: Encourage diet and exercise changes for overall health improvement.    COUNSELING:   Reviewed preventive health counseling, as reflected in patient instructions       Regular exercise       Healthy diet/nutrition       Vision screening       Hearing screening       Colorectal cancer screening       Prostate cancer screening    BMI:   Estimated body mass index is 34.15 kg/m  as calculated from the following:    Height as of this encounter: 1.88 m (6' 2\").    Weight as of this encounter: 120.7 kg (266 lb).   Weight management plan: Discussed healthy diet and exercise guidelines      He reports that he has never smoked. He has never used smokeless tobacco.    Andrae Chatman MD  [facility]    Disclaimer: This note consists of symbols derived from keyboarding, dictation and/or voice recognition software. As a result, there may be errors in the script that have gone undetected. Please consider this when interpreting information found in this chart.  "

## 2023-04-14 LAB — NONINV COLON CA DNA+OCC BLD SCRN STL QL: NEGATIVE

## 2023-04-15 NOTE — RESULT ENCOUNTER NOTE
Please inform of results if patient has not viewed in [facility] within 3 business days.    Your stool sample test screening for colon cancer was normal.    Please call the clinic with any questions you may have.     Have a great day,    Dr. River

## 2023-05-12 DIAGNOSIS — E78.5 DYSLIPIDEMIA, GOAL LDL BELOW 70: ICD-10-CM

## 2023-05-12 DIAGNOSIS — I21.3 ST ELEVATION MYOCARDIAL INFARCTION (STEMI), UNSPECIFIED ARTERY (H): ICD-10-CM

## 2023-05-12 DIAGNOSIS — I25.5 ISCHEMIC CARDIOMYOPATHY: ICD-10-CM

## 2023-05-12 DIAGNOSIS — I10 BENIGN ESSENTIAL HTN: ICD-10-CM

## 2023-05-12 RX ORDER — ATORVASTATIN CALCIUM 80 MG/1
80 TABLET, FILM COATED ORAL AT BEDTIME
Qty: 90 TABLET | Refills: 0 | Status: SHIPPED | OUTPATIENT
Start: 2023-05-12 | End: 2023-08-14

## 2023-05-12 RX ORDER — LISINOPRIL 5 MG/1
5 TABLET ORAL DAILY
Qty: 90 TABLET | Refills: 0 | Status: SHIPPED | OUTPATIENT
Start: 2023-05-12 | End: 2023-08-14

## 2023-05-24 DIAGNOSIS — I25.5 ISCHEMIC CARDIOMYOPATHY: ICD-10-CM

## 2023-05-24 DIAGNOSIS — I10 BENIGN ESSENTIAL HTN: ICD-10-CM

## 2023-05-24 RX ORDER — CARVEDILOL 12.5 MG/1
12.5 TABLET ORAL 2 TIMES DAILY WITH MEALS
Qty: 180 TABLET | Refills: 0 | Status: SHIPPED | OUTPATIENT
Start: 2023-05-24 | End: 2023-08-31

## 2023-06-07 DIAGNOSIS — E78.5 DYSLIPIDEMIA, GOAL LDL BELOW 70: ICD-10-CM

## 2023-06-07 RX ORDER — EZETIMIBE 10 MG/1
10 TABLET ORAL DAILY
Qty: 90 TABLET | Refills: 0 | Status: SHIPPED | OUTPATIENT
Start: 2023-06-07 | End: 2023-09-12

## 2023-08-02 ENCOUNTER — OFFICE VISIT (OUTPATIENT)
Dept: CARDIOLOGY | Facility: CLINIC | Age: 63
End: 2023-08-02
Payer: COMMERCIAL

## 2023-08-02 VITALS
WEIGHT: 268.3 LBS | BODY MASS INDEX: 34.43 KG/M2 | HEART RATE: 66 BPM | HEIGHT: 74 IN | DIASTOLIC BLOOD PRESSURE: 78 MMHG | SYSTOLIC BLOOD PRESSURE: 113 MMHG

## 2023-08-02 DIAGNOSIS — I10 BENIGN ESSENTIAL HTN: ICD-10-CM

## 2023-08-02 DIAGNOSIS — I21.3 ST ELEVATION MYOCARDIAL INFARCTION (STEMI), UNSPECIFIED ARTERY (H): ICD-10-CM

## 2023-08-02 DIAGNOSIS — I25.5 ISCHEMIC CARDIOMYOPATHY: ICD-10-CM

## 2023-08-02 DIAGNOSIS — E78.5 DYSLIPIDEMIA, GOAL LDL BELOW 70: ICD-10-CM

## 2023-08-02 DIAGNOSIS — I25.10 CORONARY ARTERY DISEASE INVOLVING NATIVE CORONARY ARTERY OF NATIVE HEART WITHOUT ANGINA PECTORIS: Primary | ICD-10-CM

## 2023-08-02 PROCEDURE — 99214 OFFICE O/P EST MOD 30 MIN: CPT | Performed by: INTERNAL MEDICINE

## 2023-08-02 NOTE — LETTER
8/2/2023    Andrae Chatman MD  [address] [facility] [address]    RE: Mark Butterfield       Dear Colleague,     I had the pleasure of seeing Mark Butterfield in the [facility].    General Cardiology Clinic Progress Note  Mark Butterfield MRN# [id number]   YOB: 1960 Age: 62 year old       Reason for visit: Coronary artery disease and myocardial infarction    History of presenting illness:    I had the opportunity to see Mr. Mark Butterfield in Cardiology Clinic today at [facility] in [place] for reevaluation of coronary artery disease, myocardial infarction, ischemic cardiomyopathy. Mr. Butterfield is a 62-year-old gentleman with a history of anterior wall myocardial infarction in 2017. He underwent emergency coronary angiography and stenting of his LAD and diagonal arteries, then came back a few weeks later for stenting of his posterior descending coronary artery. He has had residual distal LAD territory wall motion abnormality since that infarction with mildly reduced left ventricular function.     This year he repeated an echocardiogram in March showing mildly reduced left ventricular systolic function, with a stable ejection fraction of 45 to 50% and persistent hypokinesis involving the distal anterior wall and apex.    He seems to be doing very well.  He is not experiencing any significant chest pain shortness of breath lightheadedness or syncope.  His cholesterol numbers are excellent on maximum dose atorvastatin plus Zetia.  His blood pressures are excellent as well on atorvastatin and lisinopril.  He continues to be very active, doing a lot of work around [place].  He takes a walk twice a day and denies shortness of breath and chest discomfort at those times.              Assessment and Plan:     ASSESSMENT:    Mr. Mark Aguilar is a 62-year-old gentleman with hypertension, dyslipidemia, and coronary artery disease including anteroapical  "infarction back in 2017.  He underwent stenting of his LAD and diagonal arteries at that time and subsequently came back for stenting of his PDA in a staged fashion.    He has a mild ischemic cardiomyopathy with an ejection fraction of 45 to 50% but no signs or symptoms of persistent angina or congestive heart failure.  He is on excellent medical therapy and I will not make any changes.  I will plan to see him back again next year for reevaluation and repeat some laboratory testing at that time.    Tomas Joe MD           Orders this Visit:  Orders Placed This Encounter   Procedures    Basic metabolic panel    Lipid Profile    ALT    Follow-Up with Cardiology     No orders of the defined types were placed in this encounter.    There are no discontinued medications.    Today's clinic visit entailed:  Review of the result(s) of each unique test - fasting lipid panel, ALT, basic metabolic panel, echocardiogram  Ordering of each unique test  Prescription drug management  30 minutes spent by me on the date of the encounter doing chart review, review of test results, interpretation of tests, patient visit, and documentation   Provider  Link to [facility] Help Grid     The level of medical decision making during this visit was of moderate complexity.           Review of Systems:     Review of Systems:  Skin:  Negative     Eyes:  Negative    ENT:  Positive for postnasal drainage  Respiratory:  Negative    Cardiovascular:  Negative;palpitations;chest pain;syncope or near-syncope;cyanosis;fatigue;edema;exercise intolerance;Negative for Positive for;dizziness;lightheadedness  Gastroenterology: Negative    Genitourinary:  Negative    Musculoskeletal:  Negative    Neurologic:  Negative    Psychiatric:  Negative    Heme/Lymph/Imm:  Negative    Endocrine:  Negative              Physical Exam:     Vitals: /78 (BP Location: Right arm, Patient Position: Sitting)   Pulse 66   Ht 1.88 m (6' 2\")   Wt 121.7 kg (268 lb 4.8 oz)   BMI " 34.45 kg/m    Constitutional: Well nourished and in no apparent distress.  Eyes: Pupils equal, round. Sclerae anicteric.   HEENT: Normocephalic, atraumatic.   Neck: Supple. JVD   Respiratory: Breathing non-labored. Lungs clear to auscultation bilaterally. No crackles, wheezes, rhonchi, or rales.  Cardiovascular:  Regular rate and rhythm, normal S1 and S2. No murmur, rub, or gallop.  Skin: Warm, dry. No rashes, cyanosis, or xanthelasma.  Extremities: No edema.  Neurologic: No gross motor deficits. Alert, awake, and oriented to person, place and time.  Psychiatric: Affect appropriate.             Medications:     Current Outpatient Medications   Medication Sig Dispense Refill    aspirin (ASA) 81 MG EC tablet Take 1 tablet (81 mg) by mouth daily 90 tablet 0    atorvastatin (LIPITOR) 80 MG tablet Take 1 tablet (80 mg) by mouth At Bedtime 90 tablet 0    carvedilol (COREG) 12.5 MG tablet Take 1 tablet (12.5 mg) by mouth 2 times daily (with meals) 180 tablet 0    ezetimibe (ZETIA) 10 MG tablet Take 1 tablet (10 mg) by mouth daily 90 tablet 0    lisinopril (ZESTRIL) 5 MG tablet Take 1 tablet (5 mg) by mouth daily 90 tablet 0    nitroGLYcerin (NITROSTAT) 0.4 MG sublingual tablet Place 1 tablet (0.4 mg) under the tongue every 5 minutes as needed for chest pain (may repeat x 2) 25 tablet 1       Family History   Problem Relation Age of Onset    Coronary Artery Disease Mother     Coronary Artery Disease Father     Obesity Brother     Coronary Artery Disease Maternal Grandfather     Coronary Artery Disease Paternal Grandfather 56    Colon Cancer No family hx of     Breast Cancer No family hx of     Prostate Cancer No family hx of        Social History     Socioeconomic History    Marital status:      Spouse name: Not on file    Number of children: Not on file    Years of education: Not on file    Highest education level: Not on file   Occupational History    Not on file   Tobacco Use    Smoking status: Never    Smokeless  tobacco: Never   Vaping Use    Vaping Use: Never used   Substance and Sexual Activity    Alcohol use: No    Drug use: No    Sexual activity: Yes     Partners: Female     Birth control/protection: Post-menopausal, Female Surgical   Other Topics Concern    Parent/sibling w/ CABG, MI or angioplasty before 65F 55M? Yes     Comment: Father   Social History Narrative    Not on file     Social Determinants of Health     Financial Resource Strain: Not on file   Food Insecurity: Not on file   Transportation Needs: Not on file   Physical Activity: Not on file   Stress: Not on file   Social Connections: Not on file   Intimate Partner Violence: Not on file   Housing Stability: Not on file            Past Medical History:     Past Medical History:   Diagnosis Date    Benign essential HTN 1/27/2017    Coronary artery disease involving native coronary artery of native heart without angina pectoris 1/31/2017    Dyslipidemia, goal LDL below 70 1/27/2017    Ischemic cardiomyopathy 1/27/2017    ST elevation (STEMI) myocardial infarction involving left anterior descending coronary artery (H) 1/24/2017              Past Surgical History:     Past Surgical History:   Procedure Laterality Date    HEART CATH LEFT HEART CATH  01/24/2017    LIZY to mid LAD    HEART CATH LEFT HEART CATH  01/27/2017    LIZY to mid rPDA              Allergies:   Penicillins       Data:   All laboratory data reviewed:    Recent Labs   Lab Test 03/10/23  0920 04/18/22  0914 06/07/21  0806   LDL 63 49 87   HDL 32* 40 38*   NHDL 78 64 110   CHOL 110 104 148   TRIG 74 76 116       Lab Results   Component Value Date    WBC 13.1 (H) 01/25/2017    RBC 4.90 01/25/2017    HGB 15.5 01/25/2017    HCT 43.1 01/25/2017    MCV 88 01/25/2017    MCH 31.6 01/25/2017    MCHC 36.0 01/25/2017    RDW 13.0 01/25/2017     01/25/2017       Lab Results   Component Value Date     03/10/2023     03/03/2021    POTASSIUM 4.3 03/10/2023    POTASSIUM 4.1 04/18/2022     POTASSIUM 4.1 03/03/2021    CHLORIDE 104 03/10/2023    CHLORIDE 109 04/18/2022    CHLORIDE 109 03/03/2021    CO2 29 03/10/2023    CO2 27 04/18/2022    CO2 27 03/03/2021    ANIONGAP 8 03/10/2023    ANIONGAP 2 (L) 04/18/2022    ANIONGAP 4 03/03/2021    GLC 97 03/10/2023     (H) 04/18/2022     (H) 03/03/2021    BUN 20.3 03/10/2023    BUN 16 04/18/2022    BUN 18 03/03/2021    CR 1.20 (H) 03/10/2023    CR 1.21 03/03/2021    GFRESTIMATED 68 03/10/2023    GFRESTIMATED 64 03/03/2021    GFRESTBLACK 74 03/03/2021    LUCA 9.4 03/10/2023    LUCA 9.2 03/03/2021      Lab Results   Component Value Date    AST 32 11/07/2018    ALT 33 03/10/2023    ALT 32 03/03/2021       Lab Results   Component Value Date    A1C 5.8 01/25/2017       Lab Results   Component Value Date    INR 1.02 01/24/2017         LISA PARDO MD  [facility]    Thank you for allowing me to participate in the care of your patient.      Sincerely,     LISA PARDO MD     [facility]  cc:   No referring provider defined for this encounter.

## 2023-08-02 NOTE — PROGRESS NOTES
General Cardiology Clinic Progress Note  Mark Butterfield MRN# [id number]   YOB: 1960 Age: 62 year old       Reason for visit: Coronary artery disease and myocardial infarction    History of presenting illness:    I had the opportunity to see Mr. Mark Butterfield in Cardiology Clinic today at [facility] in [place] for reevaluation of coronary artery disease, myocardial infarction, ischemic cardiomyopathy. Mr. Butterfield is a 62-year-old gentleman with a history of anterior wall myocardial infarction in 2017. He underwent emergency coronary angiography and stenting of his LAD and diagonal arteries, then came back a few weeks later for stenting of his posterior descending coronary artery. He has had residual distal LAD territory wall motion abnormality since that infarction with mildly reduced left ventricular function.     This year he repeated an echocardiogram in March showing mildly reduced left ventricular systolic function, with a stable ejection fraction of 45 to 50% and persistent hypokinesis involving the distal anterior wall and apex.    He seems to be doing very well.  He is not experiencing any significant chest pain shortness of breath lightheadedness or syncope.  His cholesterol numbers are excellent on maximum dose atorvastatin plus Zetia.  His blood pressures are excellent as well on atorvastatin and lisinopril.  He continues to be very active, doing a lot of work around [place].  He takes a walk twice a day and denies shortness of breath and chest discomfort at those times.              Assessment and Plan:     ASSESSMENT:    Mr. Mark Aguilar is a 62-year-old gentleman with hypertension, dyslipidemia, and coronary artery disease including anteroapical infarction back in 2017.  He underwent stenting of his LAD and diagonal arteries at that time and subsequently came back for stenting of his PDA in a staged fashion.    He has a mild ischemic cardiomyopathy with an  "ejection fraction of 45 to 50% but no signs or symptoms of persistent angina or congestive heart failure.  He is on excellent medical therapy and I will not make any changes.  I will plan to see him back again next year for reevaluation and repeat some laboratory testing at that time.    Tomas Joe MD           Orders this Visit:  Orders Placed This Encounter   Procedures    Basic metabolic panel    Lipid Profile    ALT    Follow-Up with Cardiology     No orders of the defined types were placed in this encounter.    There are no discontinued medications.    Today's clinic visit entailed:  Review of the result(s) of each unique test - fasting lipid panel, ALT, basic metabolic panel, echocardiogram  Ordering of each unique test  Prescription drug management  30 minutes spent by me on the date of the encounter doing chart review, review of test results, interpretation of tests, patient visit, and documentation   Provider  Link to [facility] Help Grid     The level of medical decision making during this visit was of moderate complexity.           Review of Systems:     Review of Systems:  Skin:  Negative     Eyes:  Negative    ENT:  Positive for postnasal drainage  Respiratory:  Negative    Cardiovascular:  Negative;palpitations;chest pain;syncope or near-syncope;cyanosis;fatigue;edema;exercise intolerance;Negative for Positive for;dizziness;lightheadedness  Gastroenterology: Negative    Genitourinary:  Negative    Musculoskeletal:  Negative    Neurologic:  Negative    Psychiatric:  Negative    Heme/Lymph/Imm:  Negative    Endocrine:  Negative              Physical Exam:     Vitals: /78 (BP Location: Right arm, Patient Position: Sitting)   Pulse 66   Ht 1.88 m (6' 2\")   Wt 121.7 kg (268 lb 4.8 oz)   BMI 34.45 kg/m    Constitutional: Well nourished and in no apparent distress.  Eyes: Pupils equal, round. Sclerae anicteric.   HEENT: Normocephalic, atraumatic.   Neck: Supple. JVD   Respiratory: Breathing non-labored. " Lungs clear to auscultation bilaterally. No crackles, wheezes, rhonchi, or rales.  Cardiovascular:  Regular rate and rhythm, normal S1 and S2. No murmur, rub, or gallop.  Skin: Warm, dry. No rashes, cyanosis, or xanthelasma.  Extremities: No edema.  Neurologic: No gross motor deficits. Alert, awake, and oriented to person, place and time.  Psychiatric: Affect appropriate.             Medications:     Current Outpatient Medications   Medication Sig Dispense Refill    aspirin (ASA) 81 MG EC tablet Take 1 tablet (81 mg) by mouth daily 90 tablet 0    atorvastatin (LIPITOR) 80 MG tablet Take 1 tablet (80 mg) by mouth At Bedtime 90 tablet 0    carvedilol (COREG) 12.5 MG tablet Take 1 tablet (12.5 mg) by mouth 2 times daily (with meals) 180 tablet 0    ezetimibe (ZETIA) 10 MG tablet Take 1 tablet (10 mg) by mouth daily 90 tablet 0    lisinopril (ZESTRIL) 5 MG tablet Take 1 tablet (5 mg) by mouth daily 90 tablet 0    nitroGLYcerin (NITROSTAT) 0.4 MG sublingual tablet Place 1 tablet (0.4 mg) under the tongue every 5 minutes as needed for chest pain (may repeat x 2) 25 tablet 1       Family History   Problem Relation Age of Onset    Coronary Artery Disease Mother     Coronary Artery Disease Father     Obesity Brother     Coronary Artery Disease Maternal Grandfather     Coronary Artery Disease Paternal Grandfather 56    Colon Cancer No family hx of     Breast Cancer No family hx of     Prostate Cancer No family hx of        Social History     Socioeconomic History    Marital status:      Spouse name: Not on file    Number of children: Not on file    Years of education: Not on file    Highest education level: Not on file   Occupational History    Not on file   Tobacco Use    Smoking status: Never    Smokeless tobacco: Never   Vaping Use    Vaping Use: Never used   Substance and Sexual Activity    Alcohol use: No    Drug use: No    Sexual activity: Yes     Partners: Female     Birth control/protection: Post-menopausal,  Female Surgical   Other Topics Concern    Parent/sibling w/ CABG, MI or angioplasty before 65F 55M? Yes     Comment: Father   Social History Narrative    Not on file     Social Determinants of Health     Financial Resource Strain: Not on file   Food Insecurity: Not on file   Transportation Needs: Not on file   Physical Activity: Not on file   Stress: Not on file   Social Connections: Not on file   Intimate Partner Violence: Not on file   Housing Stability: Not on file            Past Medical History:     Past Medical History:   Diagnosis Date    Benign essential HTN 1/27/2017    Coronary artery disease involving native coronary artery of native heart without angina pectoris 1/31/2017    Dyslipidemia, goal LDL below 70 1/27/2017    Ischemic cardiomyopathy 1/27/2017    ST elevation (STEMI) myocardial infarction involving left anterior descending coronary artery (H) 1/24/2017              Past Surgical History:     Past Surgical History:   Procedure Laterality Date    HEART CATH LEFT HEART CATH  01/24/2017    LIZY to mid LAD    HEART CATH LEFT HEART CATH  01/27/2017    LIZY to mid rPDA              Allergies:   Penicillins       Data:   All laboratory data reviewed:    Recent Labs   Lab Test 03/10/23  0920 04/18/22  0914 06/07/21  0806   LDL 63 49 87   HDL 32* 40 38*   NHDL 78 64 110   CHOL 110 104 148   TRIG 74 76 116       Lab Results   Component Value Date    WBC 13.1 (H) 01/25/2017    RBC 4.90 01/25/2017    HGB 15.5 01/25/2017    HCT 43.1 01/25/2017    MCV 88 01/25/2017    MCH 31.6 01/25/2017    MCHC 36.0 01/25/2017    RDW 13.0 01/25/2017     01/25/2017       Lab Results   Component Value Date     03/10/2023     03/03/2021    POTASSIUM 4.3 03/10/2023    POTASSIUM 4.1 04/18/2022    POTASSIUM 4.1 03/03/2021    CHLORIDE 104 03/10/2023    CHLORIDE 109 04/18/2022    CHLORIDE 109 03/03/2021    CO2 29 03/10/2023    CO2 27 04/18/2022    CO2 27 03/03/2021    ANIONGAP 8 03/10/2023    ANIONGAP 2 (L)  04/18/2022    ANIONGAP 4 03/03/2021    GLC 97 03/10/2023     (H) 04/18/2022     (H) 03/03/2021    BUN 20.3 03/10/2023    BUN 16 04/18/2022    BUN 18 03/03/2021    CR 1.20 (H) 03/10/2023    CR 1.21 03/03/2021    GFRESTIMATED 68 03/10/2023    GFRESTIMATED 64 03/03/2021    GFRESTBLACK 74 03/03/2021    LUCA 9.4 03/10/2023    LUAC 9.2 03/03/2021      Lab Results   Component Value Date    AST 32 11/07/2018    ALT 33 03/10/2023    ALT 32 03/03/2021       Lab Results   Component Value Date    A1C 5.8 01/25/2017       Lab Results   Component Value Date    INR 1.02 01/24/2017         LISA PARDO MD  [facility]

## 2023-08-02 NOTE — PATIENT INSTRUCTIONS
It was a pleasure seeing you today and thank you for allowing me to be a part of your health care team.  Should you have any questions regarding your visit or future needs please feel free to reach out to my care team for assistance.      Thank you, Dr. Tomas Joe        **Nursing: [phone]       **Scheduling: [phone]

## 2023-08-14 DIAGNOSIS — E78.5 DYSLIPIDEMIA, GOAL LDL BELOW 70: ICD-10-CM

## 2023-08-14 DIAGNOSIS — I21.3 ST ELEVATION MYOCARDIAL INFARCTION (STEMI), UNSPECIFIED ARTERY (H): ICD-10-CM

## 2023-08-14 DIAGNOSIS — I10 BENIGN ESSENTIAL HTN: ICD-10-CM

## 2023-08-14 DIAGNOSIS — I25.5 ISCHEMIC CARDIOMYOPATHY: ICD-10-CM

## 2023-08-14 RX ORDER — LISINOPRIL 5 MG/1
5 TABLET ORAL DAILY
Qty: 90 TABLET | Refills: 4 | Status: SHIPPED | OUTPATIENT
Start: 2023-08-14

## 2023-08-14 RX ORDER — ATORVASTATIN CALCIUM 80 MG/1
80 TABLET, FILM COATED ORAL AT BEDTIME
Qty: 90 TABLET | Refills: 4 | Status: SHIPPED | OUTPATIENT
Start: 2023-08-14

## 2023-08-31 DIAGNOSIS — I25.5 ISCHEMIC CARDIOMYOPATHY: ICD-10-CM

## 2023-08-31 DIAGNOSIS — I10 BENIGN ESSENTIAL HTN: ICD-10-CM

## 2023-08-31 RX ORDER — CARVEDILOL 12.5 MG/1
12.5 TABLET ORAL 2 TIMES DAILY WITH MEALS
Qty: 180 TABLET | Refills: 4 | Status: SHIPPED | OUTPATIENT
Start: 2023-08-31

## 2023-09-01 ENCOUNTER — E-VISIT (OUTPATIENT)
Dept: FAMILY MEDICINE | Facility: CLINIC | Age: 63
End: 2023-09-01
Payer: COMMERCIAL

## 2023-09-01 DIAGNOSIS — L20.82 FLEXURAL ECZEMA: Primary | ICD-10-CM

## 2023-09-01 PROCEDURE — 99421 OL DIG E/M SVC 5-10 MIN: CPT | Performed by: FAMILY MEDICINE

## 2023-09-01 RX ORDER — TRIAMCINOLONE ACETONIDE 1 MG/G
OINTMENT TOPICAL 2 TIMES DAILY
Qty: 30 G | Refills: 1 | Status: SHIPPED | OUTPATIENT
Start: 2023-09-01

## 2023-09-01 NOTE — TELEPHONE ENCOUNTER
Provider E-Visit time total (minutes): 4 min    Start time: 11:32 AM 9/1/2023     Appears to be flexural eczema based on photo. Treat as ordered.        End Time: 11:36 AM  9/1/2023     Andrae Chatman MD  [facility]    Disclaimer: This note consists of symbols derived from keyboarding, dictation and/or voice recognition software. As a result, there may be errors in the script that have gone undetected. Please consider this when interpreting information found in this chart.

## 2023-09-01 NOTE — PATIENT INSTRUCTIONS
Dear Mark Butterfield    After reviewing your responses, I've been able to diagnose you with flexural eczema, which is a common skin condition that causes thickening of skin, itching, and sometimes small fluid-filled blisters or bumps to appear on your skin. The exact cause is unknown but your risk may increase if you have allergies, smoke, or have other skin conditions. Some foods such as mushrooms, chocolate and coffee also are known potential triggers.     Based on your responses, I have prescribed a stronger version than hydrocortisone to treat this called triamcinolone. Please follow the instructions on the medication. If you experience irritation of your skin, new rash, or any other new symptoms, you should stop using this medication and contact me.     If this treatment does not work for you or you will run out of refills, please plan to follow- up with me to set refills for a longer period of time or to try other options.     Things you can do to help prevent this:     Do not scratch your rash.Bacteria from your fingernails may enter your open sores during scratching and cause an infection.     Use moisturizes or emollients, such as petroleum jelly.These help relieve itching and help prevent bacteria from getting in your sores. If you have a doctor's order for medicated cream, apply that first. Then apply the moisturizer or emollient on top.    Thanks for choosing us as your health care partner,    Andrae Chatman MD

## 2023-09-12 DIAGNOSIS — E78.5 DYSLIPIDEMIA, GOAL LDL BELOW 70: ICD-10-CM

## 2023-09-12 RX ORDER — EZETIMIBE 10 MG/1
10 TABLET ORAL DAILY
Qty: 90 TABLET | Refills: 4 | Status: SHIPPED | OUTPATIENT
Start: 2023-09-12 | End: 2024-09-19

## 2024-01-17 ENCOUNTER — PATIENT OUTREACH (OUTPATIENT)
Dept: GASTROENTEROLOGY | Facility: CLINIC | Age: 64
End: 2024-01-17
Payer: COMMERCIAL

## 2024-02-21 ENCOUNTER — PATIENT OUTREACH (OUTPATIENT)
Dept: CARE COORDINATION | Facility: CLINIC | Age: 64
End: 2024-02-21
Payer: COMMERCIAL

## 2024-03-06 ENCOUNTER — PATIENT OUTREACH (OUTPATIENT)
Dept: CARE COORDINATION | Facility: CLINIC | Age: 64
End: 2024-03-06
Payer: COMMERCIAL

## 2024-05-04 ENCOUNTER — HEALTH MAINTENANCE LETTER (OUTPATIENT)
Age: 64
End: 2024-05-04

## 2024-08-05 ENCOUNTER — MYC MEDICAL ADVICE (OUTPATIENT)
Dept: FAMILY MEDICINE | Facility: CLINIC | Age: 64
End: 2024-08-05
Payer: COMMERCIAL

## 2024-08-05 NOTE — LETTER
[facility]  [address]  [place] [address]  Phone: [phone]  Fax: [phone]  August 19, 2024      Mark Butterfield  [address]  [place] [address]      Dear ,    We care about your health and have reviewed your health plan including your medical conditions, medications, and lab results.  Based on this review, it is recommended that you follow up regarding the following health topic(s):  -Cholesterol  -High Blood Pressure  -Wellness (Physical) Visit   -Immunizations:  Health Maintenance Due   Topic Date Due    Pneumococcal Vaccine: Pediatrics (0 to 5 Years) and At-Risk Patients (6 to 64 Years) (1 of 2 - PCV) Never done    ZOSTER IMMUNIZATION (1 of 2) Never done    COVID-19 Vaccine (2 - 2023-24 season) 09/01/2023     We recommend you take the following action(s):  -schedule a WELLNESS (Physical) APPOINTMENT.  We will perform the following labs: Lipids (fasting cholesterol - nothing to eat except water and/or meds for 8-10 hours) and BMP (basic metabolic panel).     Please call us at the [facility] [phone] (or use [employer]) to address the above recommendations.     Thank you for trusting [facility] and we appreciate the opportunity to serve you.  We look forward to supporting your healthcare needs in the future.    Healthy Regards,    Your Health Care Team  [facility]

## 2024-08-19 ENCOUNTER — LAB (OUTPATIENT)
Dept: LAB | Facility: CLINIC | Age: 64
End: 2024-08-19
Payer: COMMERCIAL

## 2024-08-19 DIAGNOSIS — I10 BENIGN ESSENTIAL HTN: ICD-10-CM

## 2024-08-19 DIAGNOSIS — E78.5 DYSLIPIDEMIA, GOAL LDL BELOW 70: ICD-10-CM

## 2024-08-19 LAB
ALT SERPL W P-5'-P-CCNC: 27 U/L (ref 0–70)
ANION GAP SERPL CALCULATED.3IONS-SCNC: 9 MMOL/L (ref 7–15)
BUN SERPL-MCNC: 18.3 MG/DL (ref 8–23)
CALCIUM SERPL-MCNC: 9.5 MG/DL (ref 8.8–10.4)
CHLORIDE SERPL-SCNC: 105 MMOL/L (ref 98–107)
CHOLEST SERPL-MCNC: 120 MG/DL
CREAT SERPL-MCNC: 1.32 MG/DL (ref 0.67–1.17)
EGFRCR SERPLBLD CKD-EPI 2021: 60 ML/MIN/1.73M2
FASTING STATUS PATIENT QL REPORTED: YES
FASTING STATUS PATIENT QL REPORTED: YES
GLUCOSE SERPL-MCNC: 113 MG/DL (ref 70–99)
HCO3 SERPL-SCNC: 25 MMOL/L (ref 22–29)
HDLC SERPL-MCNC: 36 MG/DL
LDLC SERPL CALC-MCNC: 68 MG/DL
NONHDLC SERPL-MCNC: 84 MG/DL
POTASSIUM SERPL-SCNC: 4.4 MMOL/L (ref 3.4–5.3)
SODIUM SERPL-SCNC: 139 MMOL/L (ref 135–145)
TRIGL SERPL-MCNC: 82 MG/DL

## 2024-08-19 PROCEDURE — 84460 ALANINE AMINO (ALT) (SGPT): CPT

## 2024-08-19 PROCEDURE — 80048 BASIC METABOLIC PNL TOTAL CA: CPT

## 2024-08-19 PROCEDURE — 80061 LIPID PANEL: CPT

## 2024-08-19 PROCEDURE — 36415 COLL VENOUS BLD VENIPUNCTURE: CPT

## 2024-08-19 NOTE — TELEPHONE ENCOUNTER
Patient Quality Outreach Summary      Summary:    Patient is due/failing the following:   BP check, LDL (Fasting) and BP Check, Adult/Adolescent physical, date due: now , and No immunizations due  Health Maintenance Due   Topic Date Due    Pneumococcal Vaccine: Pediatrics (0 to 5 Years) and At-Risk Patients (6 to 64 Years) (1 of 2 - PCV) Never done    ZOSTER IMMUNIZATION (1 of 2) Never done    COVID-19 Vaccine (2 - 2023-24 season) 09/01/2023     Type of outreach:    Sent letter.    Questions for provider review:    None                                                                                                                    Heena Dimas CMA (AAMA)       Chart routed to none.

## 2024-08-20 ENCOUNTER — CARE COORDINATION (OUTPATIENT)
Dept: CARDIOLOGY | Facility: CLINIC | Age: 64
End: 2024-08-20
Payer: COMMERCIAL

## 2024-08-20 DIAGNOSIS — I25.5 ISCHEMIC CARDIOMYOPATHY: Primary | ICD-10-CM

## 2024-08-20 NOTE — PROGRESS NOTES
FLP/alt and bmp results noted. Annual labs ordered by . Pt's annual visit for CAD, ischemic CM, and hyperlipidemia isn't until 12/2024. I will send an update to  as pt's creatinine is up slightly compared to previous bmp check. Pt takes lisinopril 5 mg daily, zetia, coreg 12.5 mg BID, and atorvastatin 80 mg daily. ESletteboe RN August 20, 2024, 11:46 AM          Component      Latest Ref Rng 8/19/2024  9:08 AM   Sodium      135 - 145 mmol/L 139    Potassium      3.4 - 5.3 mmol/L 4.4    Chloride      98 - 107 mmol/L 105    Carbon Dioxide (CO2)      22 - 29 mmol/L 25    Anion Gap      7 - 15 mmol/L 9    Urea Nitrogen      8.0 - 23.0 mg/dL 18.3    Creatinine      0.67 - 1.17 mg/dL 1.32 (H)    GFR Estimate      >60 mL/min/1.73m2 60 (L)    Calcium      8.8 - 10.4 mg/dL 9.5    Glucose      70 - 99 mg/dL 113 (H)    Patient Fasting? Yes    Patient Fasting? Yes    Cholesterol      <200 mg/dL 120    Triglycerides      <150 mg/dL 82    HDL Cholesterol      >=40 mg/dL 36 (L)    LDL Cholesterol Calculated      <=100 mg/dL 68    Non HDL Cholesterol      <130 mg/dL 84    ALT      0 - 70 U/L 27       Legend:  (H) High  (L) Low

## 2024-09-09 NOTE — TELEPHONE ENCOUNTER
Minimal change in labs, with only a slight increase in creatinine.  Continue the low-dose of lisinopril 5 mg daily for now and lets recheck the creatinine when I see him in December.    Tomas Joe MD

## 2024-09-18 ENCOUNTER — PATIENT OUTREACH (OUTPATIENT)
Dept: CARE COORDINATION | Facility: CLINIC | Age: 64
End: 2024-09-18
Payer: COMMERCIAL

## 2024-09-19 DIAGNOSIS — E78.5 DYSLIPIDEMIA, GOAL LDL BELOW 70: ICD-10-CM

## 2024-09-19 RX ORDER — EZETIMIBE 10 MG/1
10 TABLET ORAL DAILY
Qty: 90 TABLET | Refills: 0 | Status: SHIPPED | OUTPATIENT
Start: 2024-09-19

## 2024-10-21 ENCOUNTER — PATIENT OUTREACH (OUTPATIENT)
Dept: CARE COORDINATION | Facility: CLINIC | Age: 64
End: 2024-10-21
Payer: COMMERCIAL

## 2024-12-02 ENCOUNTER — OFFICE VISIT (OUTPATIENT)
Dept: CARDIOLOGY | Facility: CLINIC | Age: 64
End: 2024-12-02
Payer: COMMERCIAL

## 2024-12-02 ENCOUNTER — LAB (OUTPATIENT)
Dept: LAB | Facility: CLINIC | Age: 64
End: 2024-12-02
Payer: COMMERCIAL

## 2024-12-02 VITALS
SYSTOLIC BLOOD PRESSURE: 116 MMHG | BODY MASS INDEX: 35.75 KG/M2 | OXYGEN SATURATION: 97 % | DIASTOLIC BLOOD PRESSURE: 79 MMHG | HEIGHT: 74 IN | HEART RATE: 79 BPM | WEIGHT: 278.6 LBS

## 2024-12-02 DIAGNOSIS — I25.10 CORONARY ARTERY DISEASE INVOLVING NATIVE CORONARY ARTERY OF NATIVE HEART WITHOUT ANGINA PECTORIS: ICD-10-CM

## 2024-12-02 DIAGNOSIS — I25.5 ISCHEMIC CARDIOMYOPATHY: ICD-10-CM

## 2024-12-02 DIAGNOSIS — E78.5 DYSLIPIDEMIA, GOAL LDL BELOW 70: ICD-10-CM

## 2024-12-02 DIAGNOSIS — R73.01 ELEVATED FASTING GLUCOSE: ICD-10-CM

## 2024-12-02 DIAGNOSIS — I10 BENIGN ESSENTIAL HTN: ICD-10-CM

## 2024-12-02 DIAGNOSIS — I25.5 ISCHEMIC CARDIOMYOPATHY: Primary | ICD-10-CM

## 2024-12-02 LAB
ANION GAP SERPL CALCULATED.3IONS-SCNC: 8 MMOL/L (ref 7–15)
BUN SERPL-MCNC: 14.6 MG/DL (ref 8–23)
CALCIUM SERPL-MCNC: 9 MG/DL (ref 8.8–10.4)
CHLORIDE SERPL-SCNC: 105 MMOL/L (ref 98–107)
CREAT SERPL-MCNC: 1.27 MG/DL (ref 0.67–1.17)
EGFRCR SERPLBLD CKD-EPI 2021: 63 ML/MIN/1.73M2
GLUCOSE SERPL-MCNC: 109 MG/DL (ref 70–99)
HCO3 SERPL-SCNC: 28 MMOL/L (ref 22–29)
POTASSIUM SERPL-SCNC: 4.7 MMOL/L (ref 3.4–5.3)
SODIUM SERPL-SCNC: 141 MMOL/L (ref 135–145)

## 2024-12-02 PROCEDURE — 36415 COLL VENOUS BLD VENIPUNCTURE: CPT

## 2024-12-02 PROCEDURE — 80048 BASIC METABOLIC PNL TOTAL CA: CPT

## 2024-12-02 PROCEDURE — 99214 OFFICE O/P EST MOD 30 MIN: CPT | Performed by: INTERNAL MEDICINE

## 2024-12-02 RX ORDER — EZETIMIBE 10 MG/1
10 TABLET ORAL DAILY
Qty: 90 TABLET | Refills: 3 | Status: SHIPPED | OUTPATIENT
Start: 2024-12-02

## 2024-12-02 RX ORDER — VALSARTAN 40 MG/1
40 TABLET ORAL DAILY
Qty: 90 TABLET | Refills: 3 | Status: SHIPPED | OUTPATIENT
Start: 2024-12-02

## 2024-12-02 NOTE — LETTER
12/2/2024    Andrae Chatman MD  [address] [facility] [address]    RE: Mark Butterfield       Dear Colleague,     I had the pleasure of seeing Mark Butterfield in the [facility].    General Cardiology Clinic Progress Note  Mark Butterfield MRN# [id number]   YOB: 1960 Age: 64 year old       Reason for visit: Coronary artery disease, ischemic cardiomyopathy    History of presenting illness:    I had the opportunity to see Mr. Mark Butterfield in Cardiology Clinic today at [facility] in [place] for reevaluation of coronary artery disease, myocardial infarction, ischemic cardiomyopathy. Mr. Butterfield is a 64-year-old gentleman with a history of anterior wall myocardial infarction in 2017. He underwent emergency coronary angiography and stenting of his LAD and diagonal arteries, then came back a few weeks later for stenting of his posterior descending coronary artery. He has had residual distal LAD territory wall motion abnormality since that infarction with mildly reduced left ventricular function.      He had and echocardiogram in March 2023 showing mildly reduced left ventricular systolic function, with a stable ejection fraction of 45 to 50% and persistent hypokinesis involving the distal anterior wall and apex.     He admits that he has not been as active this year and has gained some weight.  I see that his weight is up from 268 pounds a year ago to 278 pounds now.  However, he only has mild shortness of breath with climbing stairs and doing more vigorous physical activity.  He has no shortness of breath with his usual activity.  He has no chest pain, lightheadedness, or syncope.  He does mention a persistent dry cough.    His cardiac risk factors include hypertension and dyslipidemia.  He has some mild lightheadedness with standing up quickly from time to time, but this does not sound too significant.  His cholesterol numbers look good although his  HDL remains a bit low.  He has mildly elevated fasting glucose of 109 this year.  He probably has some evidence of prediabetes.     On examination today his blood pressure is 116/79, heart rate 79, and weight 278 pounds.  His lungs are clear.  Heart rhythm is regular.  He has no cardiac murmurs or carotid bruits.            Assessment and Plan:     ASSESSMENT:    Mr. Mark Butterfield is a 64-year-old gentleman with a history of anterior wall infarction in 2017 leading to stenting of the LAD, diagonal artery, and PDA and staged procedures.  He has mild left ventricular dysfunction with an ejection fraction of 45 to 50% with persistent hypokinesis of the distal anterior wall and apex.    Fortunately he is doing well without any concerning cardiac symptoms.  I encouraged him to remain active with regular exercise.  Weight loss will be an important part of his medical program to help avoid diabetes and worsening heart disease.    I will stop lisinopril due to a persistent dry cough and start valsartan 40 mg daily.  If he has more lightheadedness issues we may need to reduce his carvedilol to 6.25 mg p.o. twice daily.    I will plan to see him back again in 1 year for reevaluation with an echocardiogram and laboratory testing again at that time.    Tomas Joe MD           Orders this Visit:  Orders Placed This Encounter   Procedures     Basic metabolic panel     Hemoglobin A1c     Lipid Profile     ALT     Follow-Up with Cardiology     Echocardiogram Complete     Orders Placed This Encounter   Medications     ezetimibe (ZETIA) 10 MG tablet     Sig: Take 1 tablet (10 mg) by mouth daily.     Dispense:  90 tablet     Refill:  3     valsartan (DIOVAN) 40 MG tablet     Sig: Take 1 tablet (40 mg) by mouth daily.     Dispense:  90 tablet     Refill:  3     Medications Discontinued During This Encounter   Medication Reason     lisinopril (ZESTRIL) 5 MG tablet      ezetimibe (ZETIA) 10 MG tablet Reorder (No AVS)       Today's  "clinic visit entailed:    35 minutes spent by me on the date of the encounter doing chart review, history and exam, documentation and further activities per the note  Provider  Link to [facility] Help Grid     The level of medical decision making during this visit was of high complexity.           Review of Systems:     Review of Systems:  Skin:  Positive for itching   Eyes:  Negative    ENT:  Negative    Respiratory:  Positive for cough, dyspnea on exertion  Cardiovascular:    Positive for, lightheadedness  Gastroenterology: Negative    Genitourinary:  Negative    Musculoskeletal:  Negative    Neurologic:  Negative    Psychiatric:  Negative    Heme/Lymph/Imm:  Negative    Endocrine:  Negative              Physical Exam:     Vitals: /79   Pulse 79   Ht 1.88 m (6' 2\")   Wt 126.4 kg (278 lb 9.6 oz)   SpO2 97%   BMI 35.77 kg/m    Constitutional: Well nourished and in no apparent distress.  Eyes: Pupils equal, round. Sclerae anicteric.   HEENT: Normocephalic, atraumatic.   Neck: Supple. JVD   Respiratory: Breathing non-labored. Lungs clear to auscultation bilaterally. No crackles, wheezes, rhonchi, or rales.  Cardiovascular:  Regular rate and rhythm, normal S1 and S2. No murmur, rub, or gallop.  Skin: Warm, dry. No rashes, cyanosis, or xanthelasma.  Extremities: No edema.  Neurologic: No gross motor deficits. Alert, awake, and oriented to person, place and time.  Psychiatric: Affect appropriate.             Medications:     Current Outpatient Medications   Medication Sig Dispense Refill     aspirin (ASA) 81 MG EC tablet Take 1 tablet (81 mg) by mouth daily 90 tablet 0     atorvastatin (LIPITOR) 80 MG tablet Take 1 tablet (80 mg) by mouth At Bedtime 90 tablet 4     carvedilol (COREG) 12.5 MG tablet Take 1 tablet (12.5 mg) by mouth 2 times daily (with meals) 180 tablet 4     ezetimibe (ZETIA) 10 MG tablet Take 1 tablet (10 mg) by mouth daily. 90 tablet 3     nitroGLYcerin (NITROSTAT) 0.4 MG sublingual tablet Place 1 " tablet (0.4 mg) under the tongue every 5 minutes as needed for chest pain (may repeat x 2) 25 tablet 1     triamcinolone (KENALOG) 0.1 % external ointment Apply topically 2 times daily Do not apply to face. Do not apply in any one location for longer than 14 days at a time. 30 g 1     valsartan (DIOVAN) 40 MG tablet Take 1 tablet (40 mg) by mouth daily. 90 tablet 3       Family History   Problem Relation Age of Onset     Coronary Artery Disease Mother      Coronary Artery Disease Father      Obesity Brother      Coronary Artery Disease Maternal Grandfather      Coronary Artery Disease Paternal Grandfather 56     Colon Cancer No family hx of      Breast Cancer No family hx of      Prostate Cancer No family hx of        Social History     Socioeconomic History     Marital status:      Spouse name: Not on file     Number of children: Not on file     Years of education: Not on file     Highest education level: Not on file   Occupational History     Not on file   Tobacco Use     Smoking status: Never     Smokeless tobacco: Never   Vaping Use     Vaping status: Never Used   Substance and Sexual Activity     Alcohol use: No     Drug use: No     Sexual activity: Yes     Partners: Female     Birth control/protection: Post-menopausal, Female Surgical   Other Topics Concern     Parent/sibling w/ CABG, MI or angioplasty before 65F 55M? Yes     Comment: Father   Social History Narrative     Not on file     Social Drivers of Health     Financial Resource Strain: Not on file   Food Insecurity: Not on file   Transportation Needs: Not on file   Physical Activity: Not on file   Stress: Not on file   Social Connections: Not on file   Interpersonal Safety: Not on file   Housing Stability: Not on file            Past Medical History:     Past Medical History:   Diagnosis Date     Benign essential HTN 1/27/2017     Coronary artery disease involving native coronary artery of native heart without angina pectoris 1/31/2017      Dyslipidemia, goal LDL below 70 1/27/2017     Ischemic cardiomyopathy 1/27/2017     ST elevation (STEMI) myocardial infarction involving left anterior descending coronary artery (H) 1/24/2017              Past Surgical History:     Past Surgical History:   Procedure Laterality Date     HEART CATH LEFT HEART CATH  01/24/2017    LIZY to mid LAD     HEART CATH LEFT HEART CATH  01/27/2017    LIZY to mid rPDA              Allergies:   Penicillins       Data:   All laboratory data reviewed:    Recent Labs   Lab Test 08/19/24  0908 03/10/23  0920 04/18/22  0914   LDL 68 63 49   HDL 36* 32* 40   NHDL 84 78 64   CHOL 120 110 104   TRIG 82 74 76       Lab Results   Component Value Date    WBC 13.1 (H) 01/25/2017    RBC 4.90 01/25/2017    HGB 15.5 01/25/2017    HCT 43.1 01/25/2017    MCV 88 01/25/2017    MCH 31.6 01/25/2017    MCHC 36.0 01/25/2017    RDW 13.0 01/25/2017     01/25/2017       Lab Results   Component Value Date     12/02/2024     03/03/2021    POTASSIUM 4.7 12/02/2024    POTASSIUM 4.1 04/18/2022    POTASSIUM 4.1 03/03/2021    CHLORIDE 105 12/02/2024    CHLORIDE 109 04/18/2022    CHLORIDE 109 03/03/2021    CO2 28 12/02/2024    CO2 27 04/18/2022    CO2 27 03/03/2021    ANIONGAP 8 12/02/2024    ANIONGAP 2 (L) 04/18/2022    ANIONGAP 4 03/03/2021     (H) 12/02/2024     (H) 04/18/2022     (H) 03/03/2021    BUN 14.6 12/02/2024    BUN 16 04/18/2022    BUN 18 03/03/2021    CR 1.27 (H) 12/02/2024    CR 1.21 03/03/2021    GFRESTIMATED 63 12/02/2024    GFRESTIMATED 64 03/03/2021    GFRESTBLACK 74 03/03/2021    LUCA 9.0 12/02/2024    LUCA 9.2 03/03/2021      Lab Results   Component Value Date    AST 32 11/07/2018    ALT 27 08/19/2024    ALT 32 03/03/2021       Lab Results   Component Value Date    A1C 5.8 01/25/2017       Lab Results   Component Value Date    INR 1.02 01/24/2017         LISA PARDO MD  [facility]    Thank you for allowing me to participate in the care of your  patient.      Sincerely,     TOMAS JOE MD     [facility]  cc:   Tomas Joe MD  [address]  [place]  [place] [address]

## 2024-12-02 NOTE — PROGRESS NOTES
General Cardiology Clinic Progress Note  Mark Butterfield MRN# [id number]   YOB: 1960 Age: 64 year old       Reason for visit: Coronary artery disease, ischemic cardiomyopathy    History of presenting illness:    I had the opportunity to see Mr. Mark Butterfield in Cardiology Clinic today at [facility] in [place] for reevaluation of coronary artery disease, myocardial infarction, ischemic cardiomyopathy. Mr. Butterfield is a 64-year-old gentleman with a history of anterior wall myocardial infarction in 2017. He underwent emergency coronary angiography and stenting of his LAD and diagonal arteries, then came back a few weeks later for stenting of his posterior descending coronary artery. He has had residual distal LAD territory wall motion abnormality since that infarction with mildly reduced left ventricular function.      He had and echocardiogram in March 2023 showing mildly reduced left ventricular systolic function, with a stable ejection fraction of 45 to 50% and persistent hypokinesis involving the distal anterior wall and apex.     He admits that he has not been as active this year and has gained some weight.  I see that his weight is up from 268 pounds a year ago to 278 pounds now.  However, he only has mild shortness of breath with climbing stairs and doing more vigorous physical activity.  He has no shortness of breath with his usual activity.  He has no chest pain, lightheadedness, or syncope.  He does mention a persistent dry cough.    His cardiac risk factors include hypertension and dyslipidemia.  He has some mild lightheadedness with standing up quickly from time to time, but this does not sound too significant.  His cholesterol numbers look good although his HDL remains a bit low.  He has mildly elevated fasting glucose of 109 this year.  He probably has some evidence of prediabetes.     On examination today his blood pressure is 116/79, heart rate 79, and weight 278  pounds.  His lungs are clear.  Heart rhythm is regular.  He has no cardiac murmurs or carotid bruits.            Assessment and Plan:     ASSESSMENT:    Mr. Mark Butterfield is a 64-year-old gentleman with a history of anterior wall infarction in 2017 leading to stenting of the LAD, diagonal artery, and PDA and staged procedures.  He has mild left ventricular dysfunction with an ejection fraction of 45 to 50% with persistent hypokinesis of the distal anterior wall and apex.    Fortunately he is doing well without any concerning cardiac symptoms.  I encouraged him to remain active with regular exercise.  Weight loss will be an important part of his medical program to help avoid diabetes and worsening heart disease.    I will stop lisinopril due to a persistent dry cough and start valsartan 40 mg daily.  If he has more lightheadedness issues we may need to reduce his carvedilol to 6.25 mg p.o. twice daily.    I will plan to see him back again in 1 year for reevaluation with an echocardiogram and laboratory testing again at that time.    Tomas Joe MD           Orders this Visit:  Orders Placed This Encounter   Procedures    Basic metabolic panel    Hemoglobin A1c    Lipid Profile    ALT    Follow-Up with Cardiology    Echocardiogram Complete     Orders Placed This Encounter   Medications    ezetimibe (ZETIA) 10 MG tablet     Sig: Take 1 tablet (10 mg) by mouth daily.     Dispense:  90 tablet     Refill:  3    valsartan (DIOVAN) 40 MG tablet     Sig: Take 1 tablet (40 mg) by mouth daily.     Dispense:  90 tablet     Refill:  3     Medications Discontinued During This Encounter   Medication Reason    lisinopril (ZESTRIL) 5 MG tablet     ezetimibe (ZETIA) 10 MG tablet Reorder (No AVS)       Today's clinic visit entailed:    35 minutes spent by me on the date of the encounter doing chart review, history and exam, documentation and further activities per the note  Provider  Link to [facility] Help Grid     The level of medical  "decision making during this visit was of high complexity.           Review of Systems:     Review of Systems:  Skin:  Positive for itching   Eyes:  Negative    ENT:  Negative    Respiratory:  Positive for cough, dyspnea on exertion  Cardiovascular:    Positive for, lightheadedness  Gastroenterology: Negative    Genitourinary:  Negative    Musculoskeletal:  Negative    Neurologic:  Negative    Psychiatric:  Negative    Heme/Lymph/Imm:  Negative    Endocrine:  Negative              Physical Exam:     Vitals: /79   Pulse 79   Ht 1.88 m (6' 2\")   Wt 126.4 kg (278 lb 9.6 oz)   SpO2 97%   BMI 35.77 kg/m    Constitutional: Well nourished and in no apparent distress.  Eyes: Pupils equal, round. Sclerae anicteric.   HEENT: Normocephalic, atraumatic.   Neck: Supple. JVD   Respiratory: Breathing non-labored. Lungs clear to auscultation bilaterally. No crackles, wheezes, rhonchi, or rales.  Cardiovascular:  Regular rate and rhythm, normal S1 and S2. No murmur, rub, or gallop.  Skin: Warm, dry. No rashes, cyanosis, or xanthelasma.  Extremities: No edema.  Neurologic: No gross motor deficits. Alert, awake, and oriented to person, place and time.  Psychiatric: Affect appropriate.             Medications:     Current Outpatient Medications   Medication Sig Dispense Refill    aspirin (ASA) 81 MG EC tablet Take 1 tablet (81 mg) by mouth daily 90 tablet 0    atorvastatin (LIPITOR) 80 MG tablet Take 1 tablet (80 mg) by mouth At Bedtime 90 tablet 4    carvedilol (COREG) 12.5 MG tablet Take 1 tablet (12.5 mg) by mouth 2 times daily (with meals) 180 tablet 4    ezetimibe (ZETIA) 10 MG tablet Take 1 tablet (10 mg) by mouth daily. 90 tablet 3    nitroGLYcerin (NITROSTAT) 0.4 MG sublingual tablet Place 1 tablet (0.4 mg) under the tongue every 5 minutes as needed for chest pain (may repeat x 2) 25 tablet 1    triamcinolone (KENALOG) 0.1 % external ointment Apply topically 2 times daily Do not apply to face. Do not apply in any one " location for longer than 14 days at a time. 30 g 1    valsartan (DIOVAN) 40 MG tablet Take 1 tablet (40 mg) by mouth daily. 90 tablet 3       Family History   Problem Relation Age of Onset    Coronary Artery Disease Mother     Coronary Artery Disease Father     Obesity Brother     Coronary Artery Disease Maternal Grandfather     Coronary Artery Disease Paternal Grandfather 56    Colon Cancer No family hx of     Breast Cancer No family hx of     Prostate Cancer No family hx of        Social History     Socioeconomic History    Marital status:      Spouse name: Not on file    Number of children: Not on file    Years of education: Not on file    Highest education level: Not on file   Occupational History    Not on file   Tobacco Use    Smoking status: Never    Smokeless tobacco: Never   Vaping Use    Vaping status: Never Used   Substance and Sexual Activity    Alcohol use: No    Drug use: No    Sexual activity: Yes     Partners: Female     Birth control/protection: Post-menopausal, Female Surgical   Other Topics Concern    Parent/sibling w/ CABG, MI or angioplasty before 65F 55M? Yes     Comment: Father   Social History Narrative    Not on file     Social Drivers of Health     Financial Resource Strain: Not on file   Food Insecurity: Not on file   Transportation Needs: Not on file   Physical Activity: Not on file   Stress: Not on file   Social Connections: Not on file   Interpersonal Safety: Not on file   Housing Stability: Not on file            Past Medical History:     Past Medical History:   Diagnosis Date    Benign essential HTN 1/27/2017    Coronary artery disease involving native coronary artery of native heart without angina pectoris 1/31/2017    Dyslipidemia, goal LDL below 70 1/27/2017    Ischemic cardiomyopathy 1/27/2017    ST elevation (STEMI) myocardial infarction involving left anterior descending coronary artery (H) 1/24/2017              Past Surgical History:     Past Surgical History:    Procedure Laterality Date    HEART CATH LEFT HEART CATH  01/24/2017    LIZY to mid LAD    HEART CATH LEFT HEART CATH  01/27/2017    LIZY to mid rPDA              Allergies:   Penicillins       Data:   All laboratory data reviewed:    Recent Labs   Lab Test 08/19/24  0908 03/10/23  0920 04/18/22  0914   LDL 68 63 49   HDL 36* 32* 40   NHDL 84 78 64   CHOL 120 110 104   TRIG 82 74 76       Lab Results   Component Value Date    WBC 13.1 (H) 01/25/2017    RBC 4.90 01/25/2017    HGB 15.5 01/25/2017    HCT 43.1 01/25/2017    MCV 88 01/25/2017    MCH 31.6 01/25/2017    MCHC 36.0 01/25/2017    RDW 13.0 01/25/2017     01/25/2017       Lab Results   Component Value Date     12/02/2024     03/03/2021    POTASSIUM 4.7 12/02/2024    POTASSIUM 4.1 04/18/2022    POTASSIUM 4.1 03/03/2021    CHLORIDE 105 12/02/2024    CHLORIDE 109 04/18/2022    CHLORIDE 109 03/03/2021    CO2 28 12/02/2024    CO2 27 04/18/2022    CO2 27 03/03/2021    ANIONGAP 8 12/02/2024    ANIONGAP 2 (L) 04/18/2022    ANIONGAP 4 03/03/2021     (H) 12/02/2024     (H) 04/18/2022     (H) 03/03/2021    BUN 14.6 12/02/2024    BUN 16 04/18/2022    BUN 18 03/03/2021    CR 1.27 (H) 12/02/2024    CR 1.21 03/03/2021    GFRESTIMATED 63 12/02/2024    GFRESTIMATED 64 03/03/2021    GFRESTBLACK 74 03/03/2021    LUCA 9.0 12/02/2024    LUCA 9.2 03/03/2021      Lab Results   Component Value Date    AST 32 11/07/2018    ALT 27 08/19/2024    ALT 32 03/03/2021       Lab Results   Component Value Date    A1C 5.8 01/25/2017       Lab Results   Component Value Date    INR 1.02 01/24/2017         LISA PARDO MD  [facility]

## 2024-12-02 NOTE — PATIENT INSTRUCTIONS
It was a pleasure seeing you today and thank you for allowing me to be a part of your health care team.  Should you have any questions regarding your visit or future needs please feel free to reach out to my care team for assistance.      Thank you, Dr. Tomas Joe        **Nursing: [phone]       **Scheduling: [phone]

## 2024-12-04 DIAGNOSIS — I10 BENIGN ESSENTIAL HTN: ICD-10-CM

## 2024-12-04 DIAGNOSIS — I25.5 ISCHEMIC CARDIOMYOPATHY: ICD-10-CM

## 2024-12-04 RX ORDER — CARVEDILOL 12.5 MG/1
12.5 TABLET ORAL 2 TIMES DAILY WITH MEALS
Qty: 180 TABLET | Refills: 4 | Status: SHIPPED | OUTPATIENT
Start: 2024-12-04

## 2025-01-12 ENCOUNTER — HEALTH MAINTENANCE LETTER (OUTPATIENT)
Age: 65
End: 2025-01-12

## 2025-02-24 ENCOUNTER — MYC MEDICAL ADVICE (OUTPATIENT)
Dept: FAMILY MEDICINE | Facility: CLINIC | Age: 65
End: 2025-02-24
Payer: COMMERCIAL

## 2025-02-24 NOTE — TELEPHONE ENCOUNTER
Patient Quality Outreach    Patient is due for the following:   Physical Annual Wellness Visit    Action(s) Taken:   Schedule a Annual Wellness Visit    Type of outreach:    Sent [employer] message.    Questions for provider review:    None           Nydia Modi MA  Chart routed to Care Team.

## 2025-03-03 NOTE — TELEPHONE ENCOUNTER
Scheduled.    Roderick Edmond    Future Appointments 3/3/2025 - 8/30/2025        Date Visit Type Length Department Provider     3/19/2025 10:30 AM PREVENTATIVE ADULT 30 min [facility] Andrae Chatman MD    Location Instructions:     [facility] is located at [address] [place] [address], one mile north of the [place] [address] exit off of [address] [address]. From [address] [facility], exit to turn west on [address] [place] [address], then turn south on [facility].

## 2025-03-18 SDOH — HEALTH STABILITY: PHYSICAL HEALTH: ON AVERAGE, HOW MANY DAYS PER WEEK DO YOU ENGAGE IN MODERATE TO STRENUOUS EXERCISE (LIKE A BRISK WALK)?: 4 DAYS

## 2025-03-18 SDOH — HEALTH STABILITY: PHYSICAL HEALTH: ON AVERAGE, HOW MANY MINUTES DO YOU ENGAGE IN EXERCISE AT THIS LEVEL?: 20 MIN

## 2025-03-18 ASSESSMENT — SOCIAL DETERMINANTS OF HEALTH (SDOH): HOW OFTEN DO YOU GET TOGETHER WITH FRIENDS OR RELATIVES?: MORE THAN THREE TIMES A WEEK

## 2025-03-19 ENCOUNTER — OFFICE VISIT (OUTPATIENT)
Dept: FAMILY MEDICINE | Facility: CLINIC | Age: 65
End: 2025-03-19
Payer: COMMERCIAL

## 2025-03-19 VITALS
HEIGHT: 74 IN | BODY MASS INDEX: 35.04 KG/M2 | RESPIRATION RATE: 14 BRPM | HEART RATE: 60 BPM | WEIGHT: 273 LBS | SYSTOLIC BLOOD PRESSURE: 106 MMHG | OXYGEN SATURATION: 98 % | DIASTOLIC BLOOD PRESSURE: 62 MMHG | TEMPERATURE: 97.4 F

## 2025-03-19 DIAGNOSIS — E78.5 DYSLIPIDEMIA, GOAL LDL BELOW 70: ICD-10-CM

## 2025-03-19 DIAGNOSIS — R73.03 PREDIABETES: ICD-10-CM

## 2025-03-19 DIAGNOSIS — I25.10 CORONARY ARTERY DISEASE INVOLVING NATIVE CORONARY ARTERY OF NATIVE HEART WITHOUT ANGINA PECTORIS: ICD-10-CM

## 2025-03-19 DIAGNOSIS — I10 BENIGN ESSENTIAL HTN: ICD-10-CM

## 2025-03-19 DIAGNOSIS — R05.3 CHRONIC COUGH: ICD-10-CM

## 2025-03-19 DIAGNOSIS — E66.01 CLASS 2 SEVERE OBESITY WITH BODY MASS INDEX (BMI) OF 35 TO 39.9 WITH SERIOUS COMORBIDITY (H): ICD-10-CM

## 2025-03-19 DIAGNOSIS — Z00.00 ROUTINE GENERAL MEDICAL EXAMINATION AT A HEALTH CARE FACILITY: Primary | ICD-10-CM

## 2025-03-19 DIAGNOSIS — I25.5 ISCHEMIC CARDIOMYOPATHY: ICD-10-CM

## 2025-03-19 DIAGNOSIS — E66.812 CLASS 2 SEVERE OBESITY WITH BODY MASS INDEX (BMI) OF 35 TO 39.9 WITH SERIOUS COMORBIDITY (H): ICD-10-CM

## 2025-03-19 DIAGNOSIS — N18.2 CKD (CHRONIC KIDNEY DISEASE) STAGE 2, GFR 60-89 ML/MIN: ICD-10-CM

## 2025-03-19 PROBLEM — I11.0 HYPERTENSIVE HEART DISEASE WITH HEART FAILURE (H): Status: RESOLVED | Noted: 2025-03-19 | Resolved: 2025-03-19

## 2025-03-19 PROBLEM — I11.0 HYPERTENSIVE HEART DISEASE WITH HEART FAILURE (H): Status: ACTIVE | Noted: 2025-03-19

## 2025-03-19 LAB
BASOPHILS # BLD AUTO: 0 10E3/UL (ref 0–0.2)
BASOPHILS NFR BLD AUTO: 1 %
EOSINOPHIL # BLD AUTO: 0.4 10E3/UL (ref 0–0.7)
EOSINOPHIL NFR BLD AUTO: 6 %
ERYTHROCYTE [DISTWIDTH] IN BLOOD BY AUTOMATED COUNT: 12.9 % (ref 10–15)
EST. AVERAGE GLUCOSE BLD GHB EST-MCNC: 123 MG/DL
HBA1C MFR BLD: 5.9 % (ref 0–5.6)
HCT VFR BLD AUTO: 43.8 % (ref 40–53)
HGB BLD-MCNC: 14.8 G/DL (ref 13.3–17.7)
IMM GRANULOCYTES # BLD: 0 10E3/UL
IMM GRANULOCYTES NFR BLD: 0 %
LYMPHOCYTES # BLD AUTO: 2.1 10E3/UL (ref 0.8–5.3)
LYMPHOCYTES NFR BLD AUTO: 30 %
MCH RBC QN AUTO: 30.7 PG (ref 26.5–33)
MCHC RBC AUTO-ENTMCNC: 33.8 G/DL (ref 31.5–36.5)
MCV RBC AUTO: 91 FL (ref 78–100)
MONOCYTES # BLD AUTO: 0.5 10E3/UL (ref 0–1.3)
MONOCYTES NFR BLD AUTO: 8 %
NEUTROPHILS # BLD AUTO: 3.9 10E3/UL (ref 1.6–8.3)
NEUTROPHILS NFR BLD AUTO: 56 %
PLATELET # BLD AUTO: 233 10E3/UL (ref 150–450)
RBC # BLD AUTO: 4.82 10E6/UL (ref 4.4–5.9)
WBC # BLD AUTO: 7 10E3/UL (ref 4–11)

## 2025-03-19 ASSESSMENT — PAIN SCALES - GENERAL: PAINLEVEL_OUTOF10: NO PAIN (0)

## 2025-03-19 NOTE — PATIENT INSTRUCTIONS
Patient Education     Entresto instead of valsartan (Entresto contains this medication)  Ask about going off of carvedilol (Coreg)    Call your insurance about where to get the shingles vaccine if interested.   Call your insurance about where to get the RSV vaccine if interested.     Regarding your Cough:  There are many causes for a prolonged cough.   The most common are:  Post nasal drip (phlegm leaking down the back of your throat from your sinuses)  Allergies  Heartburn/reflux (acid coming back up and spilling into your airway)  Irritants such as tobacco smoke, or chemicals at work  Asthma  COPD   Certain medications such as ACE inhibitors (the most commonly used in this area is lisinopril).    To start, we usually try over the counter medications for 1 month first  Nasal Flonase (fluticasone) spray, once each nostril daily for post nasal drip  Acid blocker (Prilosec [Omeprazole] once daily, or Pepcid [Famotidine] twice daily)    If your symptoms do not improve within 1 month we will check a chest xray and lung function tests. Message me if not improved and I will order these for you without the need for another visit. These are more expensive/invasive tests and therefore we usually wait until after the trial of medications above for this.     Preventive Care Advice   This is general advice given by our system to help you stay healthy. However, your care team may have specific advice just for you. Please talk to your care team about your preventive care needs.  Nutrition  Eat 5 or more servings of fruits and vegetables each day.  Try wheat bread, brown rice and whole grain pasta (instead of white bread, rice, and pasta).  Get enough calcium and vitamin D. Check the label on foods and aim for 100% of the RDA (recommended daily allowance).  Lifestyle  Exercise at least 150 minutes each week  (30 minutes a day, 5 days a week).  Do muscle strengthening activities 2 days a week. These help control your weight and  prevent disease.  No smoking.  Wear sunscreen to prevent skin cancer.  Have a dental exam and cleaning every 6 months.  Yearly exams  See your health care team every year to talk about:  Any changes in your health.  Any medicines your care team has prescribed.  Preventive care, family planning, and ways to prevent chronic diseases.  Shots (vaccines)   HPV shots (up to age 26), if you've never had them before.  Hepatitis B shots (up to age 59), if you've never had them before.  COVID-19 shot: Get this shot when it's due.  Flu shot: Get a flu shot every year.  Tetanus shot: Get a tetanus shot every 10 years.  Pneumococcal, hepatitis A, and RSV shots: Ask your care team if you need these based on your risk.  Shingles shot (for age 50 and up)  General health tests  Diabetes screening:  Starting at age 35, Get screened for diabetes at least every 3 years.  If you are younger than age 35, ask your care team if you should be screened for diabetes.  Cholesterol test: At age 39, start having a cholesterol test every 5 years, or more often if advised.  Bone density scan (DEXA): At age 50, ask your care team if you should have this scan for osteoporosis (brittle bones).  Hepatitis C: Get tested at least once in your life.  STIs (sexually transmitted infections)  Before age 24: Ask your care team if you should be screened for STIs.  After age 24: Get screened for STIs if you're at risk. You are at risk for STIs (including HIV) if:  You are sexually active with more than one person.  You don't use condoms every time.  You or a partner was diagnosed with a sexually transmitted infection.  If you are at risk for HIV, ask about PrEP medicine to prevent HIV.  Get tested for HIV at least once in your life, whether you are at risk for HIV or not.  Cancer screening tests  Cervical cancer screening: If you have a cervix, begin getting regular cervical cancer screening tests starting at age 21.  Breast cancer scan (mammogram): If you've  ever had breasts, begin having regular mammograms starting at age 40. This is a scan to check for breast cancer.  Colon cancer screening: It is important to start screening for colon cancer at age 45.  Have a colonoscopy test every 10 years (or more often if you're at risk) Or, ask your provider about stool tests like a FIT test every year or Cologuard test every 3 years.  To learn more about your testing options, visit:   .  For help making a decision, visit:   https://bit.ly/vu15174.  Prostate cancer screening test: If you have a prostate, ask your care team if a prostate cancer screening test (PSA) at age 55 is right for you.  Lung cancer screening: If you are a current or former smoker ages 50 to 80, ask your care team if ongoing lung cancer screenings are right for you.  For informational purposes only. Not to replace the advice of your health care provider. Copyright   2023 [facility] [employer]. All rights reserved. Clinically reviewed by the [facility] Transitions Program. [employer] [id number] - REV 01/24.

## 2025-03-19 NOTE — RESULT ENCOUNTER NOTE
Kandice Flores,    If you have not viewed these results on [employer] within 3 days, we will use an alternative method to contact you. We will contact you via the following protocol:    - Via letter if your results are normal.  - Via phone ([phone]) if your results are abnormal.     Here are my comments about your recent results:    CBC Results - Your cell counts were normal.    Please call the clinic ([phone]), or message us on [employer] with any questions you may have.     Have a great day,    Dr. River

## 2025-03-19 NOTE — PROGRESS NOTES
Preventive Care Visit  [facility] JORDAN Chatman MD, Family Medicine  Mar 19, 2025    Assessment & Plan   1. Routine general medical examination at a health care facility (Primary)  Discussed personal health and safety. Routine screenings ordered as below. Appropriate anticipatory guidance, vaccinations, and health screening recommendations delivered according to the USPSTF and other appropriate society guidelines.  reports understanding and in agreement with this mutually agreed upon plan.    Today's Orders:  - REVIEW OF HEALTH MAINTENANCE PROTOCOL ORDERS  - Lipid panel reflex to direct LDL Non-fasting; Future  - Apolipoprotein B; Future  - CBC with Platelets & Differential; Future  - Prostate Specific Antigen Screen; Future  - Comprehensive metabolic panel (BMP + Alb, Alk Phos, ALT, AST, Total. Bili, TP); Future  - Hemoglobin A1c; Future  - Lipid panel reflex to direct LDL Non-fasting  - Apolipoprotein B  - CBC with Platelets & Differential  - Prostate Specific Antigen Screen  - Comprehensive metabolic panel (BMP + Alb, Alk Phos, ALT, AST, Total. Bili, TP)  - Hemoglobin A1c    2. Coronary artery disease involving native coronary artery of native heart without angina pectoris  3. Ischemic cardiomyopathy  Encourage continued routine follow-up with specialty for this condition. Given mild decrease in EF, will get their opinion on Entresto over valsartan. Blood pressures on the lower end. Messaged Cardiology.    4. Dyslipidemia, goal LDL below 70  Continue statin.    5. Benign essential HTN  Controlled currently on valsartan.    6. Prediabetes  New diagnosis with mild glucose elevations in the past.    7. CKD (chronic kidney disease) stage 2, GFR 60-89 ml/min  Monitor.    8. Chronic cough  Trial OTC flonase.    9. Class 2 severe obesity with body mass index (BMI) of 35 to 39.9 with serious comorbidity (H)  Noted. Encourage diet and exercise changes for weight loss and overall health  improvement.     Follow-up Visit   Expected date:  Mar 19, 2026 (Approximate)      Follow Up Appointment Details:     Follow-up with whom?: PCP    Follow-Up for what?: Adult Preventive    How?: In Person               Andrae Chatman MD  [facility]    Disclaimer: This note consists of symbols derived from keyboarding, dictation and/or voice recognition software. As a result, there may be errors in the script that have gone undetected. Please consider this when interpreting information found in this chart.    The longitudinal plan of care for the diagnosis(es)/condition(s) as documented were addressed during this visit. Due to the added complexity in care, I will continue to support  in the subsequent management and with ongoing continuity of care.    Subjective    is a 64 year old, presenting for the following:  Physical        3/19/2025    10:13 AM   Additional Questions   Roomed by YK   Accompanied by self          HPI  Patient is not on Medicare.     Saw Cardiology 12/2/25. Recommended 1 year follow-up  and echo. Hx of anterior wall STEMI 2017 with stenting to LAD, diagonal artery, and PDA staged    Cologuard done 2023 normal.    Advance Care Planning  Patient does not have a Health Care Directive: Discussed advance care planning with patient; however, patient declined at this time.      3/18/2025   General Health   How would you rate your overall physical health? Excellent   Feel stress (tense, anxious, or unable to sleep) Only a little   (!) STRESS CONCERN      3/18/2025   Nutrition   Diet: I don't know         3/18/2025   Exercise   Days per week of moderate/strenous exercise 4 days   Average minutes spent exercising at this level 20 min         3/18/2025   Social Factors   Frequency of gathering with friends or relatives More than three times a week   Worry food won't last until get money to buy more No   Food not last or not have enough money for food? No   Do  you have housing? (Housing is defined as stable permanent housing and does not include staying ouside in a car, in a tent, in an abandoned building, in an overnight shelter, or couch-surfing.) Yes   Are you worried about losing your housing? No   Lack of transportation? No   Unable to get utilities (heat,electricity)? No         3/18/2025   Fall Risk   Fallen 2 or more times in the past year? No    No   Trouble with walking or balance? No    No       Multiple values from one day are sorted in reverse-chronological order          3/18/2025   Activities of Daily Living- Home Safety   Needs help with the following daily activites None of the above   Safety concerns in the home None of the above         3/18/2025   Dental   Dentist two times every year? Yes         3/18/2025   Hearing Screening   Hearing concerns? None of the above         3/18/2025   Driving Risk Screening   Patient/family members have concerns about driving No         3/18/2025   General Alertness/Fatigue Screening   Have you been more tired than usual lately? No         3/18/2025   Urinary Incontinence Screening   Bothered by leaking urine in past 6 months No           Today's PHQ-2 Score:       3/18/2025     9:37 PM   PHQ-2 ( 1999 [employer])   Q1: Little interest or pleasure in doing things 0   Q2: Feeling down, depressed or hopeless 0   PHQ-2 Score 0    Q1: Little interest or pleasure in doing things Not at all   Q2: Feeling down, depressed or hopeless Not at all   PHQ-2 Score 0       Patient-reported           3/18/2025   Substance Use   Alcohol more than 3/day or more than 7/wk Not Applicable   Do you have a current opioid prescription? No   How severe/bad is pain from 1 to 10? 0/10 (No Pain)   Do you use any other substances recreationally? No     Social History     Tobacco Use    Smoking status: Never    Smokeless tobacco: Never   Vaping Use    Vaping status: Never Used   Substance Use Topics    Alcohol use: No    Drug use: No         3/18/2025  "  AAA Screening   Family history of Abdominal Aortic Aneurysm (AAA)? No   Last PSA: No results found for: \"PSA\"  ASCVD Risk   The ASCVD Risk score (Justo NOBLES, et al., 2019) failed to calculate for the following reasons:    Risk score cannot be calculated because patient has a medical history suggesting prior/existing ASCVD    Reviewed and updated as needed this visit by Provider   Tobacco  Allergies  Meds  Problems  Med Hx  Surg Hx  Fam Hx        Social Hx Reviewed    Past Medical History:   Diagnosis Date    Benign essential HTN 1/27/2017    Coronary artery disease involving native coronary artery of native heart without angina pectoris 1/31/2017    Dyslipidemia, goal LDL below 70 1/27/2017    Ischemic cardiomyopathy 1/27/2017    ST elevation (STEMI) myocardial infarction involving left anterior descending coronary artery (H) 1/24/2017     Past Surgical History:   Procedure Laterality Date    HEART CATH LEFT HEART CATH  01/24/2017    LIZY to mid LAD    HEART CATH LEFT HEART CATH  01/27/2017    LIZY to mid rPDA     Current providers sharing in care for this patient include:  Patient Care Team:  Andrae Chatman MD as PCP - General (Family Medicine)  Tomas Joe MD as MD (Cardiovascular Disease)  Andrae Chatman MD as Assigned PCP  Tomas Joe MD as Assigned Heart and Vascular Provider    The following health maintenance items are reviewed in Epic and correct as of today:  Health Maintenance   Topic Date Due    Pneumococcal Vaccine: 50+ Years (1 of 2 - PCV) Never done    ZOSTER IMMUNIZATION (1 of 2) Never done    RSV VACCINE (1 - Risk 60-74 years 1-dose series) Never done    ANNUAL REVIEW OF HM ORDERS  03/22/2024    INFLUENZA VACCINE (1) Never done    COVID-19 Vaccine (2 - 2024-25 season) 09/01/2024    LIPID  08/19/2025    BMP  12/02/2025    MEDICARE ANNUAL WELLNESS VISIT  03/19/2026    FALL RISK ASSESSMENT  03/19/2026    COLORECTAL CANCER SCREENING  04/09/2026    DIABETES " "SCREENING  12/02/2027    ADVANCE CARE PLANNING  03/22/2028    DTAP/TDAP/TD IMMUNIZATION (3 - Td or Tdap) 03/22/2033    PHQ-2 (once per calendar year)  Completed    HPV IMMUNIZATION  Aged Out    MENINGITIS IMMUNIZATION  Aged Out    HEPATITIS C SCREENING  Discontinued    HIV SCREENING  Discontinued         Review of Systems  Constitutional, HEENT, cardiovascular, pulmonary, GI, , musculoskeletal, neuro, skin, endocrine and psych systems are negative, except as otherwise noted.     Objective    Exam  /62   Pulse 60   Temp 97.4  F (36.3  C) (Temporal)   Resp 14   Ht 1.873 m (6' 1.74\")   Wt 123.8 kg (273 lb)   SpO2 98%   BMI 35.30 kg/m     Estimated body mass index is 35.3 kg/m  as calculated from the following:    Height as of this encounter: 1.873 m (6' 1.74\").    Weight as of this encounter: 123.8 kg (273 lb).    Physical Exam  HENT:      Head: Normocephalic and atraumatic.      Right Ear: Tympanic membrane, ear canal and external ear normal. There is no impacted cerumen.      Left Ear: Tympanic membrane, ear canal and external ear normal. There is no impacted cerumen.      Nose: Nose normal. No congestion.      Mouth/Throat:      Pharynx: Oropharynx is clear. No oropharyngeal exudate or posterior oropharyngeal erythema.   Eyes:      General:         Right eye: No discharge.         Left eye: No discharge.      Extraocular Movements: Extraocular movements intact.      Conjunctiva/sclera: Conjunctivae normal.      Pupils: Pupils are equal, round, and reactive to light.   Cardiovascular:      Rate and Rhythm: Normal rate and regular rhythm.      Heart sounds: Normal heart sounds. No murmur heard.     No friction rub.   Pulmonary:      Effort: Pulmonary effort is normal. No respiratory distress.      Breath sounds: Normal breath sounds. No wheezing or rhonchi.   Abdominal:      General: Abdomen is flat. There is no distension.      Palpations: Abdomen is soft. There is no mass.      Tenderness: There is no " abdominal tenderness. There is no guarding.   Musculoskeletal:         General: No swelling.      Cervical back: Normal range of motion and neck supple. No tenderness.      Right lower leg: No edema.      Left lower leg: No edema.   Lymphadenopathy:      Cervical: No cervical adenopathy.   Skin:     General: Skin is warm.      Findings: No rash.   Neurological:      General: No focal deficit present.      Mental Status: He is alert.      Cranial Nerves: No cranial nerve deficit.      Motor: No weakness.      Coordination: Coordination normal.      Gait: Gait normal.   Psychiatric:         Mood and Affect: Mood normal.         Behavior: Behavior normal.         Thought Content: Thought content normal.         Judgment: Judgment normal.               3/19/2025   Mini Cog   Clock Draw Score 2 Normal   3 Item Recall 3 objects recalled   Mini Cog Total Score 5             Signed Electronically by: Andrae Chatman MD

## 2025-03-19 NOTE — RESULT ENCOUNTER NOTE
Kandice Flores,    If you have not viewed these results on [employer] within 3 days, we will use an alternative method to contact you. We will contact you via the following protocol:    - Via letter if your results are normal.  - Via phone ([phone]) if your results are abnormal.     Here are my comments about your recent results:    A1c - Your 3 month blood sugar average was slightly high, between 5.7 and 6.4. This range is indicative of pre-diabetes. Individuals with pre-diabetes have a 10% per year of progressing to diabetes.     We should recheck this every year to monitor for progression to diabetes. Losing weight, a healthy diet, and exercise can help reduce your risk. If you would like to meet with a dietician, or diabetes educator to help you learn more let me know and I will place a referral for you.      Please call the clinic ([phone]), or message us on [employer] with any questions you may have.     Have a great day,    Dr. River

## 2025-03-20 LAB
ALBUMIN SERPL BCG-MCNC: 4.3 G/DL (ref 3.5–5.2)
ALP SERPL-CCNC: 114 U/L (ref 40–150)
ALT SERPL W P-5'-P-CCNC: 24 U/L (ref 0–70)
ANION GAP SERPL CALCULATED.3IONS-SCNC: 4 MMOL/L (ref 7–15)
AST SERPL W P-5'-P-CCNC: 31 U/L (ref 0–45)
BILIRUB SERPL-MCNC: 1.2 MG/DL
BUN SERPL-MCNC: 15.2 MG/DL (ref 8–23)
CALCIUM SERPL-MCNC: 9.5 MG/DL (ref 8.8–10.4)
CHLORIDE SERPL-SCNC: 105 MMOL/L (ref 98–107)
CHOLEST SERPL-MCNC: 192 MG/DL
CREAT SERPL-MCNC: 1.3 MG/DL (ref 0.67–1.17)
EGFRCR SERPLBLD CKD-EPI 2021: 61 ML/MIN/1.73M2
FASTING STATUS PATIENT QL REPORTED: YES
FASTING STATUS PATIENT QL REPORTED: YES
GLUCOSE SERPL-MCNC: 96 MG/DL (ref 70–99)
HCO3 SERPL-SCNC: 30 MMOL/L (ref 22–29)
HDLC SERPL-MCNC: 31 MG/DL
LDLC SERPL CALC-MCNC: 132 MG/DL
NONHDLC SERPL-MCNC: 161 MG/DL
POTASSIUM SERPL-SCNC: 5.1 MMOL/L (ref 3.4–5.3)
PROT SERPL-MCNC: 7.1 G/DL (ref 6.4–8.3)
PSA SERPL DL<=0.01 NG/ML-MCNC: 1.06 NG/ML (ref 0–4.5)
SODIUM SERPL-SCNC: 139 MMOL/L (ref 135–145)
TRIGL SERPL-MCNC: 146 MG/DL

## 2025-03-22 LAB — APO B100 SERPL-MCNC: 122 MG/DL

## (undated) RX ORDER — NITROGLYCERIN 5 MG/ML
VIAL (ML) INTRAVENOUS
Status: DISPENSED
Start: 2017-01-24

## (undated) RX ORDER — FENTANYL CITRATE 50 UG/ML
INJECTION, SOLUTION INTRAMUSCULAR; INTRAVENOUS
Status: DISPENSED
Start: 2017-01-27

## (undated) RX ORDER — MORPHINE SULFATE 2 MG/ML
INJECTION, SOLUTION INTRAMUSCULAR; INTRAVENOUS
Status: DISPENSED
Start: 2017-01-24

## (undated) RX ORDER — HEPARIN SODIUM 1000 [USP'U]/ML
INJECTION, SOLUTION INTRAVENOUS; SUBCUTANEOUS
Status: DISPENSED
Start: 2017-01-24

## (undated) RX ORDER — NITROGLYCERIN 5 MG/ML
VIAL (ML) INTRAVENOUS
Status: DISPENSED
Start: 2017-01-27

## (undated) RX ORDER — HEPARIN SODIUM 1000 [USP'U]/ML
INJECTION, SOLUTION INTRAVENOUS; SUBCUTANEOUS
Status: DISPENSED
Start: 2017-01-27